# Patient Record
Sex: MALE | Race: WHITE | Employment: UNEMPLOYED | URBAN - METROPOLITAN AREA
[De-identification: names, ages, dates, MRNs, and addresses within clinical notes are randomized per-mention and may not be internally consistent; named-entity substitution may affect disease eponyms.]

---

## 2020-12-23 ENCOUNTER — HOSPITAL ENCOUNTER (OUTPATIENT)
Age: 55
Discharge: HOME | End: 2020-12-25

## 2020-12-23 ENCOUNTER — HOSPITAL ENCOUNTER (OUTPATIENT)
Dept: GENERAL RADIOLOGY | Age: 55
Discharge: HOME | End: 2020-12-25

## 2020-12-23 PROCEDURE — 73630 X-RAY EXAM OF FOOT: CPT

## 2021-04-16 ENCOUNTER — APPOINTMENT (OUTPATIENT)
Dept: GENERAL RADIOLOGY | Age: 56
End: 2021-04-16

## 2021-04-16 ENCOUNTER — HOSPITAL ENCOUNTER (EMERGENCY)
Age: 56
Discharge: HOME | End: 2021-04-16

## 2021-04-16 VITALS
OXYGEN SATURATION: 97 % | DIASTOLIC BLOOD PRESSURE: 86 MMHG | HEIGHT: 72 IN | RESPIRATION RATE: 14 BRPM | TEMPERATURE: 98 F | HEART RATE: 100 BPM | BODY MASS INDEX: 35.89 KG/M2 | WEIGHT: 265 LBS | SYSTOLIC BLOOD PRESSURE: 170 MMHG

## 2021-04-16 DIAGNOSIS — M54.50 ACUTE RIGHT-SIDED LOW BACK PAIN WITHOUT SCIATICA: ICD-10-CM

## 2021-04-16 DIAGNOSIS — S33.5XXA LUMBAR SPRAIN, INITIAL ENCOUNTER: Primary | ICD-10-CM

## 2021-04-16 PROCEDURE — 96372 THER/PROPH/DIAG INJ SC/IM: CPT

## 2021-04-16 PROCEDURE — 99283 EMERGENCY DEPT VISIT LOW MDM: CPT

## 2021-04-16 PROCEDURE — 6360000002 HC RX W HCPCS: Performed by: PHYSICIAN ASSISTANT

## 2021-04-16 PROCEDURE — 72100 X-RAY EXAM L-S SPINE 2/3 VWS: CPT

## 2021-04-16 RX ORDER — DEXAMETHASONE SODIUM PHOSPHATE 10 MG/ML
10 INJECTION, SOLUTION INTRAMUSCULAR; INTRAVENOUS ONCE
Status: COMPLETED | OUTPATIENT
Start: 2021-04-16 | End: 2021-04-16

## 2021-04-16 RX ORDER — LISINOPRIL 20 MG/1
20 TABLET ORAL DAILY
COMMUNITY
End: 2021-04-25

## 2021-04-16 RX ORDER — KETOROLAC TROMETHAMINE 30 MG/ML
30 INJECTION, SOLUTION INTRAMUSCULAR; INTRAVENOUS ONCE
Status: COMPLETED | OUTPATIENT
Start: 2021-04-16 | End: 2021-04-16

## 2021-04-16 RX ORDER — HYDROCODONE BITARTRATE AND ACETAMINOPHEN 5; 325 MG/1; MG/1
1 TABLET ORAL EVERY 6 HOURS PRN
Qty: 12 TABLET | Refills: 0 | Status: SHIPPED | OUTPATIENT
Start: 2021-04-16 | End: 2021-04-19

## 2021-04-16 RX ORDER — PIOGLITAZONEHYDROCHLORIDE 15 MG/1
15 TABLET ORAL DAILY
COMMUNITY

## 2021-04-16 RX ORDER — TRAMADOL HYDROCHLORIDE 50 MG/1
50 TABLET ORAL EVERY 6 HOURS PRN
COMMUNITY

## 2021-04-16 RX ADMIN — DEXAMETHASONE SODIUM PHOSPHATE 10 MG: 10 INJECTION, SOLUTION INTRAMUSCULAR; INTRAVENOUS at 15:02

## 2021-04-16 RX ADMIN — KETOROLAC TROMETHAMINE 30 MG: 30 INJECTION, SOLUTION INTRAMUSCULAR at 15:02

## 2021-04-16 ASSESSMENT — PAIN DESCRIPTION - ORIENTATION: ORIENTATION: LOWER

## 2021-04-16 ASSESSMENT — PAIN SCALES - GENERAL: PAINLEVEL_OUTOF10: 1

## 2021-04-16 ASSESSMENT — PAIN DESCRIPTION - DESCRIPTORS: DESCRIPTORS: ACHING

## 2021-04-16 NOTE — ED PROVIDER NOTES
[facility]                                                                                                                                      Department of Emergency Medicine   ED  Provider Note  Admit Date/RoomTime: 4/16/2021  2:42 PM  ED Room: 33/33        HPI:  4/16/21,   Time: 2:49 PM EDT         Shelly Rose is a 48 y.o. male presenting to the ED for right sided low back pain, beginning 1 month ago. The complaint has been persistent, moderate in severity, and worsened by standing. Patient states that about a month ago he was helping his stepson recover from hip surgery. He reports that he leaned over in the back of his jeep to pull part of the recliner out from inside the vehicle. He states that he felt fine at the time but when he woke the next day he really could not stand or walk. He has severe pain in the right lower lumbar region that is aggravated with standing. He states that when he sitting he does not feel much pain at all. He states that when he stands still it feels like someone squeezing and tightening his back. The pain does sometimes run into both anterior thighs. He denies numbness or tingling. There is no loss of bowel or bladder control. Denies fever or chills. The patient did see his PCP and states that they did some adjustment/manipulation in the office that helped for about 20 minutes. He states that there were no x-rays or meds given. The patient is a type II diabetic but reports that his sugars run around 1 20-1 30 and he keeps a close eye on them at home. No prior back MRI or surgery.           ROS:     Constitutional: Negative for fever and chills  HENT: Negative for ear pain, sore throat and sinus pressure  Eyes: Negative for pain, discharge and redness  Respiratory:  Negative for shortness of breath, cough and wheezing  Cardiovascular: Negative for CP, edema or palpitations  Gastrointestinal: Negative for nausea, vomiting, diarrhea and abdominal distention  Genitourinary: Negative for dysuria and frequency  Musculoskeletal:  See HPI  Skin: Negative for rash and wound  Neurological: Negative for weakness and headaches  Hematological: Negative for adenopathy    All other systems reviewed and are negative      -------------------------------- PAST HISTORY ----------------------------------  Past Medical History:  has a past medical history of Arthritis, Diabetes mellitus ([facility] [address] Difficult intubation, Hypertension, Infected tooth, Pain, and Preoperative clearance. Past Surgical History:  has a past surgical history that includes Neck surgery (9001,7905); Carpal tunnel release (Right, 2012); and Wrist arthroscopy (5/30/14). Social History:  reports that he has been smoking. He has a 5.50 pack-year smoking history. He has never used smokeless tobacco. He reports that he does not drink alcohol or use drugs. Family History: family history is not on file. The patients home medications have been reviewed. Allergies: Patient has no known allergies. --------------------------------- RESULTS ------------------------------------------  All laboratory and radiology results have been personally reviewed by myself   LABS:  No results found for this visit on 04/16/21. RADIOLOGY:  Interpreted by Radiologist.  XR LUMBAR SPINE (2-3 VIEWS)   Final Result   1. There are partially imaged erosive changes at the T11-T12 disc space with   mild height loss of the T12 vertebral body. These findings may be remote   posttraumatic and/or degenerative in etiology, however chronic/prior disc   osteomyelitis can also have a similar appearance. Recommend correlation with   patient's symptoms to exclude concern for active infection. 2. There are minimal osseous degenerative changes in the lumbar spine.             ----------------- NURSING NOTES AND VITALS REVIEWED ---------------   The nursing notes within the ED encounter and vital signs as below have been reviewed. referral to specialist for persistent pain. He is aware and agreeable to this plan       Counseling: The emergency provider has spoken with the patient and discussed todays results, in addition to providing specific details for the plan of care and counseling regarding the diagnosis and prognosis. Questions are answered at this time and they are agreeable with the plan.      ------------------------ IMPRESSION AND DISPOSITION -------------------------------    IMPRESSION  1. Lumbar sprain, initial encounter    2.  Acute right-sided low back pain without sciatica        DISPOSITION  Disposition: Discharge to home  Patient condition is stable                   Nohemi Cox PA-C  04/16/21 6241

## 2021-04-25 ENCOUNTER — HOSPITAL ENCOUNTER (INPATIENT)
Age: 56
LOS: 19 days | Discharge: ANOTHER HOSPITAL | DRG: 321 | End: 2021-05-14
Attending: EMERGENCY MEDICINE | Admitting: INTERNAL MEDICINE
Payer: COMMERCIAL

## 2021-04-25 DIAGNOSIS — M54.6 ACUTE THORACIC BACK PAIN, UNSPECIFIED BACK PAIN LATERALITY: Primary | ICD-10-CM

## 2021-04-25 LAB
ALBUMIN SERPL-MCNC: 4 G/DL (ref 3.5–5.2)
ALP BLD-CCNC: 98 U/L (ref 40–129)
ALT SERPL-CCNC: 13 U/L (ref 0–40)
ANION GAP SERPL CALCULATED.3IONS-SCNC: 11 MMOL/L (ref 7–16)
AST SERPL-CCNC: 11 U/L (ref 0–39)
BACTERIA: NORMAL /HPF
BASOPHILS ABSOLUTE: 0.08 E9/L (ref 0–0.2)
BASOPHILS RELATIVE PERCENT: 0.6 % (ref 0–2)
BILIRUB SERPL-MCNC: 0.3 MG/DL (ref 0–1.2)
BILIRUBIN URINE: NEGATIVE
BLOOD, URINE: NEGATIVE
BUN BLDV-MCNC: 16 MG/DL (ref 6–20)
CALCIUM SERPL-MCNC: 10 MG/DL (ref 8.6–10.2)
CHLORIDE BLD-SCNC: 94 MMOL/L (ref 98–107)
CLARITY: CLEAR
CO2: 23 MMOL/L (ref 22–29)
COLOR: YELLOW
CREAT SERPL-MCNC: 0.7 MG/DL (ref 0.7–1.2)
EOSINOPHILS ABSOLUTE: 0.41 E9/L (ref 0.05–0.5)
EOSINOPHILS RELATIVE PERCENT: 3.3 % (ref 0–6)
GFR AFRICAN AMERICAN: >60
GFR NON-AFRICAN AMERICAN: >60 ML/MIN/1.73
GLUCOSE BLD-MCNC: 196 MG/DL (ref 74–99)
GLUCOSE URINE: NEGATIVE MG/DL
HCT VFR BLD CALC: 50.5 % (ref 37–54)
HEMOGLOBIN: 15.7 G/DL (ref 12.5–16.5)
HYALINE CASTS: NORMAL /LPF (ref 0–2)
IMMATURE GRANULOCYTES #: 0.03 E9/L
IMMATURE GRANULOCYTES %: 0.2 % (ref 0–5)
KETONES, URINE: NEGATIVE MG/DL
LACTIC ACID, SEPSIS: 1.6 MMOL/L (ref 0.5–1.9)
LEUKOCYTE ESTERASE, URINE: NEGATIVE
LYMPHOCYTES ABSOLUTE: 3.24 E9/L (ref 1.5–4)
LYMPHOCYTES RELATIVE PERCENT: 26 % (ref 20–42)
MCH RBC QN AUTO: 26.4 PG (ref 26–35)
MCHC RBC AUTO-ENTMCNC: 31.1 % (ref 32–34.5)
MCV RBC AUTO: 85 FL (ref 80–99.9)
METER GLUCOSE: 168 MG/DL (ref 74–99)
MONOCYTES ABSOLUTE: 0.99 E9/L (ref 0.1–0.95)
MONOCYTES RELATIVE PERCENT: 7.9 % (ref 2–12)
NEUTROPHILS ABSOLUTE: 7.72 E9/L (ref 1.8–7.3)
NEUTROPHILS RELATIVE PERCENT: 62 % (ref 43–80)
NITRITE, URINE: NEGATIVE
PDW BLD-RTO: 13.2 FL (ref 11.5–15)
PH UA: 6 (ref 5–9)
PLATELET # BLD: 395 E9/L (ref 130–450)
PMV BLD AUTO: 9.8 FL (ref 7–12)
POTASSIUM REFLEX MAGNESIUM: 4.9 MMOL/L (ref 3.5–5)
PROTEIN UA: NORMAL MG/DL
RBC # BLD: 5.94 E12/L (ref 3.8–5.8)
RBC UA: NORMAL /HPF (ref 0–2)
SODIUM BLD-SCNC: 128 MMOL/L (ref 132–146)
SPECIFIC GRAVITY UA: >=1.03 (ref 1–1.03)
TOTAL PROTEIN: 8 G/DL (ref 6.4–8.3)
UROBILINOGEN, URINE: 0.2 E.U./DL
WBC # BLD: 12.5 E9/L (ref 4.5–11.5)
WBC UA: NORMAL /HPF (ref 0–5)

## 2021-04-25 PROCEDURE — 81001 URINALYSIS AUTO W/SCOPE: CPT

## 2021-04-25 PROCEDURE — 99284 EMERGENCY DEPT VISIT MOD MDM: CPT

## 2021-04-25 PROCEDURE — 2580000003 HC RX 258: Performed by: NURSE PRACTITIONER

## 2021-04-25 PROCEDURE — 6360000002 HC RX W HCPCS: Performed by: NURSE PRACTITIONER

## 2021-04-25 PROCEDURE — 87040 BLOOD CULTURE FOR BACTERIA: CPT

## 2021-04-25 PROCEDURE — 6370000000 HC RX 637 (ALT 250 FOR IP): Performed by: PHYSICIAN ASSISTANT

## 2021-04-25 PROCEDURE — 36415 COLL VENOUS BLD VENIPUNCTURE: CPT

## 2021-04-25 PROCEDURE — 2580000003 HC RX 258: Performed by: PHYSICIAN ASSISTANT

## 2021-04-25 PROCEDURE — 80053 COMPREHEN METABOLIC PANEL: CPT

## 2021-04-25 PROCEDURE — 82962 GLUCOSE BLOOD TEST: CPT

## 2021-04-25 PROCEDURE — 87088 URINE BACTERIA CULTURE: CPT

## 2021-04-25 PROCEDURE — 83605 ASSAY OF LACTIC ACID: CPT

## 2021-04-25 PROCEDURE — 1200000000 HC SEMI PRIVATE

## 2021-04-25 PROCEDURE — 96375 TX/PRO/DX INJ NEW DRUG ADDON: CPT

## 2021-04-25 PROCEDURE — 96374 THER/PROPH/DIAG INJ IV PUSH: CPT

## 2021-04-25 PROCEDURE — 6370000000 HC RX 637 (ALT 250 FOR IP): Performed by: NURSE PRACTITIONER

## 2021-04-25 PROCEDURE — 85025 COMPLETE CBC W/AUTO DIFF WBC: CPT

## 2021-04-25 PROCEDURE — 6360000002 HC RX W HCPCS: Performed by: PHYSICIAN ASSISTANT

## 2021-04-25 PROCEDURE — 96361 HYDRATE IV INFUSION ADD-ON: CPT

## 2021-04-25 RX ORDER — LISINOPRIL AND HYDROCHLOROTHIAZIDE 25; 20 MG/1; MG/1
1 TABLET ORAL DAILY
COMMUNITY

## 2021-04-25 RX ORDER — DEXTROSE MONOHYDRATE 50 MG/ML
100 INJECTION, SOLUTION INTRAVENOUS PRN
Status: DISCONTINUED | OUTPATIENT
Start: 2021-04-25 | End: 2021-05-14 | Stop reason: HOSPADM

## 2021-04-25 RX ORDER — KETOROLAC TROMETHAMINE 30 MG/ML
15 INJECTION, SOLUTION INTRAMUSCULAR; INTRAVENOUS EVERY 6 HOURS PRN
Status: DISCONTINUED | OUTPATIENT
Start: 2021-04-25 | End: 2021-04-26

## 2021-04-25 RX ORDER — SODIUM CHLORIDE 0.9 % (FLUSH) 0.9 %
10 SYRINGE (ML) INJECTION PRN
Status: DISCONTINUED | OUTPATIENT
Start: 2021-04-25 | End: 2021-05-14 | Stop reason: HOSPADM

## 2021-04-25 RX ORDER — NICOTINE 21 MG/24HR
1 PATCH, TRANSDERMAL 24 HOURS TRANSDERMAL ONCE
Status: DISCONTINUED | OUTPATIENT
Start: 2021-04-25 | End: 2021-04-26

## 2021-04-25 RX ORDER — HYDROCHLOROTHIAZIDE 25 MG/1
25 TABLET ORAL DAILY
Status: DISCONTINUED | OUTPATIENT
Start: 2021-04-26 | End: 2021-04-29

## 2021-04-25 RX ORDER — DEXTROSE MONOHYDRATE 25 G/50ML
12.5 INJECTION, SOLUTION INTRAVENOUS PRN
Status: DISCONTINUED | OUTPATIENT
Start: 2021-04-25 | End: 2021-05-14 | Stop reason: HOSPADM

## 2021-04-25 RX ORDER — SODIUM CHLORIDE 9 MG/ML
25 INJECTION, SOLUTION INTRAVENOUS PRN
Status: DISCONTINUED | OUTPATIENT
Start: 2021-04-25 | End: 2021-05-14 | Stop reason: HOSPADM

## 2021-04-25 RX ORDER — LISINOPRIL 20 MG/1
20 TABLET ORAL DAILY
Status: DISCONTINUED | OUTPATIENT
Start: 2021-04-26 | End: 2021-05-14 | Stop reason: HOSPADM

## 2021-04-25 RX ORDER — ACETAMINOPHEN 650 MG/1
650 SUPPOSITORY RECTAL EVERY 6 HOURS PRN
Status: DISCONTINUED | OUTPATIENT
Start: 2021-04-25 | End: 2021-04-26

## 2021-04-25 RX ORDER — TRAMADOL HYDROCHLORIDE 50 MG/1
50 TABLET ORAL EVERY 8 HOURS PRN
Status: DISCONTINUED | OUTPATIENT
Start: 2021-04-25 | End: 2021-04-26

## 2021-04-25 RX ORDER — MORPHINE SULFATE 4 MG/ML
8 INJECTION, SOLUTION INTRAMUSCULAR; INTRAVENOUS ONCE
Status: COMPLETED | OUTPATIENT
Start: 2021-04-25 | End: 2021-04-25

## 2021-04-25 RX ORDER — POTASSIUM CHLORIDE 20 MEQ/1
40 TABLET, EXTENDED RELEASE ORAL PRN
Status: DISCONTINUED | OUTPATIENT
Start: 2021-04-25 | End: 2021-05-14 | Stop reason: HOSPADM

## 2021-04-25 RX ORDER — ONDANSETRON 4 MG/1
4 TABLET, ORALLY DISINTEGRATING ORAL ONCE
Status: COMPLETED | OUTPATIENT
Start: 2021-04-25 | End: 2021-04-25

## 2021-04-25 RX ORDER — HYDRALAZINE HYDROCHLORIDE 20 MG/ML
10 INJECTION INTRAMUSCULAR; INTRAVENOUS EVERY 6 HOURS PRN
Status: DISCONTINUED | OUTPATIENT
Start: 2021-04-25 | End: 2021-04-28

## 2021-04-25 RX ORDER — LAMOTRIGINE 100 MG/1
100 TABLET ORAL 2 TIMES DAILY
COMMUNITY

## 2021-04-25 RX ORDER — LISINOPRIL AND HYDROCHLOROTHIAZIDE 25; 20 MG/1; MG/1
1 TABLET ORAL DAILY
Status: DISCONTINUED | OUTPATIENT
Start: 2021-04-26 | End: 2021-04-25 | Stop reason: CLARIF

## 2021-04-25 RX ORDER — POTASSIUM CHLORIDE 7.45 MG/ML
10 INJECTION INTRAVENOUS PRN
Status: DISCONTINUED | OUTPATIENT
Start: 2021-04-25 | End: 2021-05-14 | Stop reason: HOSPADM

## 2021-04-25 RX ORDER — OXYCODONE HYDROCHLORIDE AND ACETAMINOPHEN 5; 325 MG/1; MG/1
1 TABLET ORAL ONCE
Status: COMPLETED | OUTPATIENT
Start: 2021-04-25 | End: 2021-04-25

## 2021-04-25 RX ORDER — ACETAMINOPHEN 325 MG/1
650 TABLET ORAL EVERY 6 HOURS PRN
Status: DISCONTINUED | OUTPATIENT
Start: 2021-04-25 | End: 2021-04-26

## 2021-04-25 RX ORDER — SULFAMETHOXAZOLE AND TRIMETHOPRIM 800; 160 MG/1; MG/1
1 TABLET ORAL 2 TIMES DAILY
COMMUNITY

## 2021-04-25 RX ORDER — NICOTINE POLACRILEX 4 MG
15 LOZENGE BUCCAL PRN
Status: DISCONTINUED | OUTPATIENT
Start: 2021-04-25 | End: 2021-05-14 | Stop reason: HOSPADM

## 2021-04-25 RX ORDER — 0.9 % SODIUM CHLORIDE 0.9 %
1000 INTRAVENOUS SOLUTION INTRAVENOUS ONCE
Status: COMPLETED | OUTPATIENT
Start: 2021-04-25 | End: 2021-04-25

## 2021-04-25 RX ORDER — SODIUM CHLORIDE 0.9 % (FLUSH) 0.9 %
10 SYRINGE (ML) INJECTION EVERY 12 HOURS SCHEDULED
Status: DISCONTINUED | OUTPATIENT
Start: 2021-04-25 | End: 2021-05-14 | Stop reason: HOSPADM

## 2021-04-25 RX ORDER — NICOTINE 21 MG/24HR
1 PATCH, TRANSDERMAL 24 HOURS TRANSDERMAL DAILY
Status: DISCONTINUED | OUTPATIENT
Start: 2021-04-26 | End: 2021-04-26

## 2021-04-25 RX ORDER — LAMOTRIGINE 100 MG/1
100 TABLET ORAL 2 TIMES DAILY
Status: DISCONTINUED | OUTPATIENT
Start: 2021-04-25 | End: 2021-05-14 | Stop reason: HOSPADM

## 2021-04-25 RX ADMIN — HYDROMORPHONE HYDROCHLORIDE 0.5 MG: 1 INJECTION, SOLUTION INTRAMUSCULAR; INTRAVENOUS; SUBCUTANEOUS at 23:23

## 2021-04-25 RX ADMIN — Medication 10 ML: at 20:24

## 2021-04-25 RX ADMIN — ONDANSETRON 4 MG: 4 TABLET, ORALLY DISINTEGRATING ORAL at 15:15

## 2021-04-25 RX ADMIN — HYDROMORPHONE HYDROCHLORIDE 0.5 MG: 1 INJECTION, SOLUTION INTRAMUSCULAR; INTRAVENOUS; SUBCUTANEOUS at 20:24

## 2021-04-25 RX ADMIN — TRAMADOL HYDROCHLORIDE 50 MG: 50 TABLET, COATED ORAL at 21:53

## 2021-04-25 RX ADMIN — OXYCODONE AND ACETAMINOPHEN 1 TABLET: 5; 325 TABLET ORAL at 15:15

## 2021-04-25 RX ADMIN — VANCOMYCIN HYDROCHLORIDE 2500 MG: 10 INJECTION, POWDER, LYOPHILIZED, FOR SOLUTION INTRAVENOUS at 23:16

## 2021-04-25 RX ADMIN — MORPHINE SULFATE 8 MG: 4 INJECTION, SOLUTION INTRAMUSCULAR; INTRAVENOUS at 17:34

## 2021-04-25 RX ADMIN — CEFTRIAXONE SODIUM 2000 MG: 2 INJECTION, POWDER, FOR SOLUTION INTRAMUSCULAR; INTRAVENOUS at 17:34

## 2021-04-25 RX ADMIN — ACETAMINOPHEN 650 MG: 325 TABLET ORAL at 20:23

## 2021-04-25 RX ADMIN — LAMOTRIGINE 100 MG: 100 TABLET ORAL at 23:15

## 2021-04-25 RX ADMIN — SODIUM CHLORIDE 1000 ML: 9 INJECTION, SOLUTION INTRAVENOUS at 17:34

## 2021-04-25 ASSESSMENT — PAIN DESCRIPTION - ORIENTATION
ORIENTATION: MID
ORIENTATION: MID;LOWER

## 2021-04-25 ASSESSMENT — PAIN SCALES - GENERAL
PAINLEVEL_OUTOF10: 8
PAINLEVEL_OUTOF10: 9
PAINLEVEL_OUTOF10: 8

## 2021-04-25 ASSESSMENT — PAIN DESCRIPTION - FREQUENCY: FREQUENCY: CONTINUOUS

## 2021-04-25 ASSESSMENT — PAIN DESCRIPTION - LOCATION
LOCATION: BACK
LOCATION: BACK

## 2021-04-25 ASSESSMENT — PAIN - FUNCTIONAL ASSESSMENT: PAIN_FUNCTIONAL_ASSESSMENT: PREVENTS OR INTERFERES SOME ACTIVE ACTIVITIES AND ADLS

## 2021-04-25 ASSESSMENT — PAIN DESCRIPTION - ONSET: ONSET: ON-GOING

## 2021-04-25 ASSESSMENT — PAIN DESCRIPTION - PROGRESSION: CLINICAL_PROGRESSION: NOT CHANGED

## 2021-04-25 ASSESSMENT — PAIN DESCRIPTION - PAIN TYPE: TYPE: ACUTE PAIN

## 2021-04-25 NOTE — LETTER
[place] Department Medicaid  CERTIFICATION OF NECESSITY  FOR NON-EMERGENCY TRANSPORTATION   BY GROUND AMBULANCE      Individual Information   1. Name: Reyes Anand 2. [place] Medicaid Billing Number:    3. Address: [place] [address]      Transportation Provider Information   4. Provider Name:    5. [place] Medicaid Provider Number:  National Provider Identifier (NPI):      Certification  7. Criteria:  During transport, this individual requires:  [x] Medical treatment or continuous     supervision by an EMT. [] The administration or regulation of oxygen by another person. [] Supervised protective restraint. 8. Period Beginning Date: 2021   9. Length  [x] Not more than 7 day(s)  [] One Year     Additional Information Relevant to Certification   10. Comments or Explanations, If Necessary or Appropriate   Thoracic back pain,spinal neutrality, T12 compression fx      Certifying Practitioner Information   11. Name of Practitioner: Dr. Jen Orellana MD   12. [place] Medicaid Provider Number, If Applicable:  [address] Provider Identifier (NPI):      Signature Information   14. Date of Signature: 2021 15. Name of Person Signing: Tasia Goldberg   12. Signature and Professional Designation: Electronically signed by AIYANA Cunningham on 2021 at 2:19 PM       [facility] [id number]  Rev. 2015       [address] [place] [address] Encounter Date/Time: 2021 32-36 [facility] Account: [de-identified]    MRN: [id number]    Patient: Brooklyn Lombardo Serial #: [id number]      ENCOUNTER          Patient Class: I Private Enc?   No Unit RM BD: [id number] [id number]-[id number]   Hospital Service: Med/Surg   Encounter DX: Thoracic back pain, unsp*   ADM Provider: Herb Mantilla MD   Procedure:     ATT Provider: Herb Mantilla MD   REF Provider:        Admission DX: Thoracic back pain, unspecified back pain laterality, unspecified chronicity and codes:       PATIENT                 Name: Reyes Anand : 1965

## 2021-04-25 NOTE — ED PROVIDER NOTES
ED Attending  CC: Shavon         [facility]  Department of Emergency Medicine   ED  Encounter Note  Admit Date/RoomTime: 2021  4:38 PM  ED Room:   NAME: Jhony Barreto  : 1965  MRN: [id number]     Chief Complaint:  Back Pain (middle back; \"feels like a knot\") and Leg Pain (both thighs)    HISTORY OF PRESENT ILLNESS        Jhony Barreto is a 48 y.o. male who presents to the ED by private vehicle for persistent back pain since being evaluated at [facility] on 21. He states there was an abnormality on his x-ray and he has had persistent mid back pain that radiates to the right mid back and to the bilateral anterior thighs that \"feels like a knot. \"  The original injury was from over one month ago when he was carrying the back of a recliner. The next day he woke up with back pain and it has been ongoing ever since. It has not been associated with any fever, chills, chest pain, shortness of breath, abdominal pain, dysuria, hematuria, loss of bowel or bladder control, urinary frequency, urinary retention, black or bloody stools, saddle paresthesia, numbness or tingling. He denies any new traumatic incident and no IVDU. Last Monday he contacted his primary care provider stating that he has a history of kidney infections and kidney stones. With his worsening of back pain, he was treated empirically with Bactrim. He has been taking it twice daily and is on his sixth day with no improvement in his back pain. He denies ever having any urinary symptoms but states he just wanted to \"get ahead of it in case it was a kidney infection. \"  He reports his pain to be aggravated by any movement especially getting up and walking causes the shooting pain into his bilateral anterior thighs. It is unalleviated with ibuprofen, rest and heat. He has a history of cervical fusion at [facility] in [address]  The complaint has been persistent and rapidly worsening and are moderate in severity. ROS   Pertinent positives and negatives are stated within HPI, all other systems reviewed and are negative. Past Medical History:  has a past medical history of Arthritis, Diabetes mellitus ([address] Difficult intubation, Hypertension, Infected tooth, Pain, and Preoperative clearance. Surgical History:  has a past surgical history that includes Neck surgery (8245,4322); Carpal tunnel release (Right, 2012); and Wrist arthroscopy (5/30/14). Social History:  reports that he has been smoking. He has a 5.50 pack-year smoking history. He has never used smokeless tobacco. He reports that he does not drink alcohol or use drugs. Family History: family history is not on file. Allergies: Patient has no known allergies. PHYSICAL EXAM   Oxygen Saturation Interpretation: Normal.        ED Triage Vitals   BP Temp Temp Source Pulse Resp SpO2 Height Weight   04/25/21 1506 04/25/21 1510 04/25/21 1510 04/25/21 1429 04/25/21 1506 04/25/21 1429 04/25/21 1506 04/25/21 1506   (!) 167/99 98.4 °F (36.9 °C) Oral 115 20 95 % 6' (1.829 m) 266 lb (120.7 kg)         Physical Exam  Constitutional/General: Alert and oriented x3, well appearing, non toxic  HEENT:  NC/NT. PERRLA,  Airway patent. Neck: Supple, full ROM, non tender to palpation in the midline, no stridor, no crepitus, no meningeal signs  Respiratory: Lungs clear to auscultation bilaterally, no wheezes, rales, or rhonchi. Not in respiratory distress  CV:  Tachycardic rate. Regular rhythm. No murmurs, gallops, or rubs. 2+ distal pulses  GI:  Abdomen Soft, Non tender, Non distended. +BS. No rebound, guarding, or rigidity. No pulsatile masses. Back: Skin tags over the midline lower thoracic spine. There is no appreciated crepitus or deformity. Just off the midline to the right is pain to palpation which reproduces his back pain. There is no other midline vertebral tenderness. There is also right CVA tenderness to palpation.   There is no outward sign of trauma, vesicular rash or ecchymosis. Musculoskeletal: Moves all extremities x 4. Warm and well perfused, no clubbing, cyanosis, or edema. No calf tenderness bilaterally or localized foot/ankle edema. Capillary refill <3 seconds  Integument: skin warm and dry. No rashes. Lymphatic: no lymphadenopathy noted  Neurologic: GCS 15, no focal deficits, symmetric strength 5/5 in the upper and lower extremities bilaterally. Strong dorsiflexion and plantar flexion bilaterally. Negative straight leg raise bilaterally.   Psychiatric: Normal Affect    Lab / Imaging Results   (All laboratory and radiology results have been personally reviewed by myself)  Labs:  Results for orders placed or performed during the hospital encounter of 04/25/21   Comprehensive Metabolic Panel w/ Reflex to MG   Result Value Ref Range    Sodium 128 (L) 132 - 146 mmol/L    Potassium reflex Magnesium 4.9 3.5 - 5.0 mmol/L    Chloride 94 (L) 98 - 107 mmol/L    CO2 23 22 - 29 mmol/L    Anion Gap 11 7 - 16 mmol/L    Glucose 196 (H) 74 - 99 mg/dL    BUN 16 6 - 20 mg/dL    CREATININE 0.7 0.7 - 1.2 mg/dL    GFR Non-African American >60 >=60 mL/min/1.73    GFR African American >60     Calcium 10.0 8.6 - 10.2 mg/dL    Total Protein 8.0 6.4 - 8.3 g/dL    Albumin 4.0 3.5 - 5.2 g/dL    Total Bilirubin 0.3 0.0 - 1.2 mg/dL    Alkaline Phosphatase 98 40 - 129 U/L    ALT 13 0 - 40 U/L    AST 11 0 - 39 U/L   CBC Auto Differential   Result Value Ref Range    WBC 12.5 (H) 4.5 - 11.5 E9/L    RBC 5.94 (H) 3.80 - 5.80 E12/L    Hemoglobin 15.7 12.5 - 16.5 g/dL    Hematocrit 50.5 37.0 - 54.0 %    MCV 85.0 80.0 - 99.9 fL    MCH 26.4 26.0 - 35.0 pg    MCHC 31.1 (L) 32.0 - 34.5 %    RDW 13.2 11.5 - 15.0 fL    Platelets 128 609 - 962 E9/L    MPV 9.8 7.0 - 12.0 fL    Neutrophils % 62.0 43.0 - 80.0 %    Immature Granulocytes % 0.2 0.0 - 5.0 %    Lymphocytes % 26.0 20.0 - 42.0 %    Monocytes % 7.9 2.0 - 12.0 %    Eosinophils % 3.3 0.0 - 6.0 %    Basophils % 0.6 0.0 - 2.0 % Neutrophils Absolute 7.72 (H) 1.80 - 7.30 E9/L    Immature Granulocytes # 0.03 E9/L    Lymphocytes Absolute 3.24 1.50 - 4.00 E9/L    Monocytes Absolute 0.99 (H) 0.10 - 0.95 E9/L    Eosinophils Absolute 0.41 0.05 - 0.50 E9/L    Basophils Absolute 0.08 0.00 - 0.20 E9/L   Urinalysis   Result Value Ref Range    Color, UA Yellow Straw/Yellow    Clarity, UA Clear Clear    Glucose, Ur Negative Negative mg/dL    Bilirubin Urine Negative Negative    Ketones, Urine Negative Negative mg/dL    Specific Gravity, UA >=1.030 1.005 - 1.030    Blood, Urine Negative Negative    pH, UA 6.0 5.0 - 9.0    Protein, UA TRACE Negative mg/dL    Urobilinogen, Urine 0.2 <2.0 E.U./dL    Nitrite, Urine Negative Negative    Leukocyte Esterase, Urine Negative Negative   Lactate, Sepsis   Result Value Ref Range    Lactic Acid, Sepsis 1.6 0.5 - 1.9 mmol/L   Microscopic Urinalysis   Result Value Ref Range    Hyaline Casts, UA 0-2 0 - 2 /LPF    WBC, UA 0-1 0 - 5 /HPF    RBC, UA NONE 0 - 2 /HPF    Bacteria, UA NONE SEEN None Seen /HPF     Imaging: All Radiology results interpreted by Radiologist unless otherwise noted. MRI THORACIC SPINE W CONTRAST    (Results Pending)   MRI LUMBAR SPINE W CONTRAST    (Results Pending)       ED Course / Medical Decision Making     Medications   vancomycin (VANCOCIN) 2,500 mg in dextrose 5 % 500 mL IVPB (has no administration in time range)   0.9 % sodium chloride bolus (1,000 mLs Intravenous New Bag 4/25/21 1734)   oxyCODONE-acetaminophen (PERCOCET) 5-325 MG per tablet 1 tablet (1 tablet Oral Given 4/25/21 1515)   ondansetron (ZOFRAN-ODT) disintegrating tablet 4 mg (4 mg Oral Given 4/25/21 1515)   cefTRIAXone (ROCEPHIN) 2,000 mg in sterile water 20 mL IV syringe (2,000 mg Intravenous Given 4/25/21 1734)   morphine (PF) injection 8 mg (8 mg Intravenous Given 4/25/21 1734)        Re-examination:  4/25/21       Time: 1700  Patients condition remains unchanged.  Patient updated on plan of care including admission and pending MRI. Consult(s):   IP CONSULT TO HOSPITALIST    Time: 18 Spoke with Marialuisa who accepts the patient for admission. Procedure(s):   none    MDM:   Patient evaluated by Dr. Ozzie Campbell as well. Review of his lumbar x-ray from April 16 reveals erosive changes at T11-T12 disc space with mild height loss of the superior T12 vertebral body. With his persistent pain, radiculopathy and leukocytosis, concern is for discitis therefore plan is for empiric coverage with Rocephin and vancomycin as per the suggestion of the clinical pharmacist [facility] and admission to the hospital for MRI. Patient is well-appearing, afebrile and without focal deficit therefore MRI was not called out for stat imaging. Other labs and diagnostics as resulted above. Discussed his history of kidney stones and given his clean UA as above, patient's low suspicion for kidney stone and lack of renal colic a CT of the abdomen and pelvis was not completed. Discitis was covered with Rocephin which would cover a pyelonephritis incidentally. Admission discussed with [facility] consultants and patient admitted. Plan of Care/Counseling:  Myself and Emergency Attending Physician reviewed today's visit with the patient in addition to providing specific details for the plan of care and counseling regarding the diagnosis and prognosis. Questions are answered at this time and are agreeable with the plan. ASSESSMENT     1. Acute thoracic back pain, unspecified back pain laterality      PLAN   Admission to General Medical Floor. Patient condition is stable    New Medications     New Prescriptions    No medications on file     Electronically signed by KARLA Hicks CNP   DD: 4/25/21  **This report was transcribed using voice recognition software. Every effort was made to ensure accuracy; however, inadvertent computerized transcription errors may be present.   END OF ED PROVIDER NOTE       KARLA Hicks CNP  04/25/21 1278

## 2021-04-25 NOTE — LETTER
[place] Department Medicaid  CERTIFICATION OF NECESSITY  FOR NON-EMERGENCY TRANSPORTATION   BY GROUND AMBULANCE      Individual Information   1. Name: Isaías Yanez 2. [place] Medicaid Billing Number:    3. Address: [place] [address]      Transportation Provider Information   4. Provider Name:    5. [place] Medicaid Provider Number:  National Provider Identifier (NPI):      Certification  7. Criteria:  During transport, this individual requires:  [x] Medical treatment or continuous     supervision by an EMT. [] The administration or regulation of oxygen by another person. [] Supervised protective restraint. 8. Period Beginning Date: 05/07/2021   9. Length  [x] Not more than 7 day(s)  [] One Year     Additional Information Relevant to Certification   10. Comments or Explanations, If Necessary or Appropriate   T 12 COMPRESSION FX WITH EPIDURAL MASS  NEW DX HGH GRADE SARCOMATIOD RENAL CELL CARCINOMA,SPINAL PRECAUTIONS, MAX ASST [address] Practitioner Information   11. Name of Practitioner: Teresa Grewal MD   12. [place] Medicaid Provider Number, If Applicable:  [address] Provider Identifier (NPI):      Signature Information   14. Date of Signature: 05/7/21 15. Name of Person Signing: Joselito Smith   16. Signature and Professional Designation: Electronically signed by Erick Crigler, LSW on 5/7/2021 at 2:32 PM       [facility] [address]  Rev. 7/2015       [address] Encounter Date/Time: 4/25/2021 [address] [place] [address] Account: [de-identified]    MRN: [id number]    Patient: Stas Tay Serial #: [id number]      ENCOUNTER          Patient Class: I Private Enc?   No Unit  BD: [address] [facility] Service: Med/Surg   Encounter DX: Thoracic back pain, unsp*   ADM Provider: Lottie Brian MD   Procedure:     ATT Provider: Lottie Brian MD   REF Provider:        Admission DX: Thoracic back pain, unspecified back pain laterality, unspecified

## 2021-04-25 NOTE — ED NOTES
Bed: 07  Expected date:   Expected time:   Means of arrival:   Comments:  Triage      Chema Zendejas RN  04/25/21 4897

## 2021-04-26 ENCOUNTER — APPOINTMENT (OUTPATIENT)
Dept: MRI IMAGING | Age: 56
DRG: 321 | End: 2021-04-26
Payer: COMMERCIAL

## 2021-04-26 ENCOUNTER — APPOINTMENT (OUTPATIENT)
Dept: CT IMAGING | Age: 56
DRG: 321 | End: 2021-04-26
Payer: COMMERCIAL

## 2021-04-26 PROBLEM — E87.1 HYPONATREMIA: Status: ACTIVE | Noted: 2021-04-26

## 2021-04-26 PROBLEM — Z72.0 TOBACCO ABUSE: Status: ACTIVE | Noted: 2021-04-26

## 2021-04-26 LAB
ANION GAP SERPL CALCULATED.3IONS-SCNC: 10 MMOL/L (ref 7–16)
BASOPHILS ABSOLUTE: 0.09 E9/L (ref 0–0.2)
BASOPHILS RELATIVE PERCENT: 0.9 % (ref 0–2)
BUN BLDV-MCNC: 14 MG/DL (ref 6–20)
C-REACTIVE PROTEIN: 3 MG/DL (ref 0–0.4)
CALCIUM SERPL-MCNC: 9.4 MG/DL (ref 8.6–10.2)
CHLORIDE BLD-SCNC: 97 MMOL/L (ref 98–107)
CO2: 26 MMOL/L (ref 22–29)
CREAT SERPL-MCNC: 0.8 MG/DL (ref 0.7–1.2)
EOSINOPHILS ABSOLUTE: 0.41 E9/L (ref 0.05–0.5)
EOSINOPHILS RELATIVE PERCENT: 4.1 % (ref 0–6)
GFR AFRICAN AMERICAN: >60
GFR NON-AFRICAN AMERICAN: >60 ML/MIN/1.73
GLUCOSE BLD-MCNC: 149 MG/DL (ref 74–99)
HBA1C MFR BLD: 8.7 % (ref 4–5.6)
HCT VFR BLD CALC: 45.4 % (ref 37–54)
HEMOGLOBIN: 14.3 G/DL (ref 12.5–16.5)
IMMATURE GRANULOCYTES #: 0.03 E9/L
IMMATURE GRANULOCYTES %: 0.3 % (ref 0–5)
LYMPHOCYTES ABSOLUTE: 3.45 E9/L (ref 1.5–4)
LYMPHOCYTES RELATIVE PERCENT: 34.4 % (ref 20–42)
MCH RBC QN AUTO: 27.4 PG (ref 26–35)
MCHC RBC AUTO-ENTMCNC: 31.5 % (ref 32–34.5)
MCV RBC AUTO: 87 FL (ref 80–99.9)
METER GLUCOSE: 174 MG/DL (ref 74–99)
METER GLUCOSE: 186 MG/DL (ref 74–99)
MONOCYTES ABSOLUTE: 0.92 E9/L (ref 0.1–0.95)
MONOCYTES RELATIVE PERCENT: 9.2 % (ref 2–12)
NEUTROPHILS ABSOLUTE: 5.12 E9/L (ref 1.8–7.3)
NEUTROPHILS RELATIVE PERCENT: 51.1 % (ref 43–80)
PDW BLD-RTO: 13.4 FL (ref 11.5–15)
PLATELET # BLD: 319 E9/L (ref 130–450)
PMV BLD AUTO: 9.8 FL (ref 7–12)
POTASSIUM REFLEX MAGNESIUM: 4.7 MMOL/L (ref 3.5–5)
RBC # BLD: 5.22 E12/L (ref 3.8–5.8)
SEDIMENTATION RATE, ERYTHROCYTE: 68 MM/HR (ref 0–15)
SODIUM BLD-SCNC: 133 MMOL/L (ref 132–146)
WBC # BLD: 10 E9/L (ref 4.5–11.5)

## 2021-04-26 PROCEDURE — 6360000002 HC RX W HCPCS: Performed by: INTERNAL MEDICINE

## 2021-04-26 PROCEDURE — 1200000000 HC SEMI PRIVATE

## 2021-04-26 PROCEDURE — 86140 C-REACTIVE PROTEIN: CPT

## 2021-04-26 PROCEDURE — 80048 BASIC METABOLIC PNL TOTAL CA: CPT

## 2021-04-26 PROCEDURE — 82962 GLUCOSE BLOOD TEST: CPT

## 2021-04-26 PROCEDURE — 2580000003 HC RX 258: Performed by: INTERNAL MEDICINE

## 2021-04-26 PROCEDURE — 85651 RBC SED RATE NONAUTOMATED: CPT

## 2021-04-26 PROCEDURE — 6360000002 HC RX W HCPCS: Performed by: PHYSICIAN ASSISTANT

## 2021-04-26 PROCEDURE — 83036 HEMOGLOBIN GLYCOSYLATED A1C: CPT

## 2021-04-26 PROCEDURE — 6370000000 HC RX 637 (ALT 250 FOR IP): Performed by: INTERNAL MEDICINE

## 2021-04-26 PROCEDURE — 6360000002 HC RX W HCPCS

## 2021-04-26 PROCEDURE — 6370000000 HC RX 637 (ALT 250 FOR IP): Performed by: PHYSICIAN ASSISTANT

## 2021-04-26 PROCEDURE — 85025 COMPLETE CBC W/AUTO DIFF WBC: CPT

## 2021-04-26 PROCEDURE — 2580000003 HC RX 258: Performed by: PHYSICIAN ASSISTANT

## 2021-04-26 PROCEDURE — 72129 CT CHEST SPINE W/DYE: CPT

## 2021-04-26 PROCEDURE — 36415 COLL VENOUS BLD VENIPUNCTURE: CPT

## 2021-04-26 PROCEDURE — 6360000004 HC RX CONTRAST MEDICATION: Performed by: RADIOLOGY

## 2021-04-26 RX ORDER — OXYCODONE HYDROCHLORIDE 5 MG/1
5 TABLET ORAL EVERY 4 HOURS PRN
Status: DISCONTINUED | OUTPATIENT
Start: 2021-04-26 | End: 2021-04-29

## 2021-04-26 RX ORDER — SODIUM CHLORIDE 9 MG/ML
INJECTION, SOLUTION INTRAVENOUS CONTINUOUS
Status: DISCONTINUED | OUTPATIENT
Start: 2021-04-26 | End: 2021-04-26

## 2021-04-26 RX ORDER — ACETAMINOPHEN 500 MG
1000 TABLET ORAL 3 TIMES DAILY
Status: DISCONTINUED | OUTPATIENT
Start: 2021-04-26 | End: 2021-05-14 | Stop reason: HOSPADM

## 2021-04-26 RX ORDER — KETOROLAC TROMETHAMINE 30 MG/ML
30 INJECTION, SOLUTION INTRAMUSCULAR; INTRAVENOUS EVERY 6 HOURS
Status: DISCONTINUED | OUTPATIENT
Start: 2021-04-26 | End: 2021-04-28

## 2021-04-26 RX ORDER — NICOTINE 21 MG/24HR
1 PATCH, TRANSDERMAL 24 HOURS TRANSDERMAL DAILY
Status: DISCONTINUED | OUTPATIENT
Start: 2021-04-26 | End: 2021-05-14 | Stop reason: HOSPADM

## 2021-04-26 RX ADMIN — Medication 10 ML: at 20:54

## 2021-04-26 RX ADMIN — SODIUM CHLORIDE: 9 INJECTION, SOLUTION INTRAVENOUS at 07:04

## 2021-04-26 RX ADMIN — HYDROMORPHONE HYDROCHLORIDE 1 MG: 1 INJECTION, SOLUTION INTRAMUSCULAR; INTRAVENOUS; SUBCUTANEOUS at 16:23

## 2021-04-26 RX ADMIN — ENOXAPARIN SODIUM 40 MG: 40 INJECTION SUBCUTANEOUS at 08:19

## 2021-04-26 RX ADMIN — IOPAMIDOL 90 ML: 755 INJECTION, SOLUTION INTRAVENOUS at 22:24

## 2021-04-26 RX ADMIN — ACETAMINOPHEN 1000 MG: 500 TABLET, FILM COATED ORAL at 20:54

## 2021-04-26 RX ADMIN — LAMOTRIGINE 100 MG: 100 TABLET ORAL at 20:54

## 2021-04-26 RX ADMIN — CEFTRIAXONE 1000 MG: 1 INJECTION, POWDER, FOR SOLUTION INTRAMUSCULAR; INTRAVENOUS at 20:54

## 2021-04-26 RX ADMIN — HYDROMORPHONE HYDROCHLORIDE 0.5 MG: 1 INJECTION, SOLUTION INTRAMUSCULAR; INTRAVENOUS; SUBCUTANEOUS at 14:43

## 2021-04-26 RX ADMIN — HYDROMORPHONE HYDROCHLORIDE 0.5 MG: 1 INJECTION, SOLUTION INTRAMUSCULAR; INTRAVENOUS; SUBCUTANEOUS at 02:16

## 2021-04-26 RX ADMIN — TRAMADOL HYDROCHLORIDE 50 MG: 50 TABLET, COATED ORAL at 18:00

## 2021-04-26 RX ADMIN — KETOROLAC TROMETHAMINE 15 MG: 30 INJECTION, SOLUTION INTRAMUSCULAR; INTRAVENOUS at 18:00

## 2021-04-26 RX ADMIN — HYDROMORPHONE HYDROCHLORIDE 1 MG: 1 INJECTION, SOLUTION INTRAMUSCULAR; INTRAVENOUS; SUBCUTANEOUS at 22:44

## 2021-04-26 RX ADMIN — VANCOMYCIN HYDROCHLORIDE 1500 MG: 10 INJECTION, POWDER, LYOPHILIZED, FOR SOLUTION INTRAVENOUS at 21:32

## 2021-04-26 RX ADMIN — OXYCODONE 5 MG: 5 TABLET ORAL at 20:54

## 2021-04-26 RX ADMIN — HYDROMORPHONE HYDROCHLORIDE 0.5 MG: 1 INJECTION, SOLUTION INTRAMUSCULAR; INTRAVENOUS; SUBCUTANEOUS at 11:30

## 2021-04-26 RX ADMIN — KETOROLAC TROMETHAMINE 15 MG: 30 INJECTION, SOLUTION INTRAMUSCULAR; INTRAVENOUS at 03:27

## 2021-04-26 RX ADMIN — LAMOTRIGINE 100 MG: 100 TABLET ORAL at 08:19

## 2021-04-26 RX ADMIN — HYDROMORPHONE HYDROCHLORIDE 1 MG: 1 INJECTION, SOLUTION INTRAMUSCULAR; INTRAVENOUS; SUBCUTANEOUS at 19:37

## 2021-04-26 RX ADMIN — Medication 10 ML: at 03:27

## 2021-04-26 RX ADMIN — ACETAMINOPHEN 650 MG: 325 TABLET ORAL at 02:16

## 2021-04-26 RX ADMIN — LISINOPRIL 20 MG: 20 TABLET ORAL at 08:19

## 2021-04-26 ASSESSMENT — PAIN SCALES - GENERAL
PAINLEVEL_OUTOF10: 7
PAINLEVEL_OUTOF10: 7
PAINLEVEL_OUTOF10: 4
PAINLEVEL_OUTOF10: 7
PAINLEVEL_OUTOF10: 10
PAINLEVEL_OUTOF10: 7
PAINLEVEL_OUTOF10: 7
PAINLEVEL_OUTOF10: 10
PAINLEVEL_OUTOF10: 4
PAINLEVEL_OUTOF10: 8
PAINLEVEL_OUTOF10: 5
PAINLEVEL_OUTOF10: 2
PAINLEVEL_OUTOF10: 3
PAINLEVEL_OUTOF10: 6

## 2021-04-26 ASSESSMENT — PAIN DESCRIPTION - DESCRIPTORS
DESCRIPTORS: BURNING;THROBBING
DESCRIPTORS: CONSTANT;DISCOMFORT;SORE

## 2021-04-26 ASSESSMENT — PAIN - FUNCTIONAL ASSESSMENT
PAIN_FUNCTIONAL_ASSESSMENT: PREVENTS OR INTERFERES SOME ACTIVE ACTIVITIES AND ADLS
PAIN_FUNCTIONAL_ASSESSMENT: PREVENTS OR INTERFERES WITH MANY ACTIVE NOT PASSIVE ACTIVITIES
PAIN_FUNCTIONAL_ASSESSMENT: PREVENTS OR INTERFERES WITH ALL ACTIVE AND SOME PASSIVE ACTIVITIES
PAIN_FUNCTIONAL_ASSESSMENT: PREVENTS OR INTERFERES SOME ACTIVE ACTIVITIES AND ADLS
PAIN_FUNCTIONAL_ASSESSMENT: PREVENTS OR INTERFERES WITH ALL ACTIVE AND SOME PASSIVE ACTIVITIES
PAIN_FUNCTIONAL_ASSESSMENT: PREVENTS OR INTERFERES WITH MANY ACTIVE NOT PASSIVE ACTIVITIES

## 2021-04-26 ASSESSMENT — PAIN DESCRIPTION - PAIN TYPE
TYPE: ACUTE PAIN

## 2021-04-26 ASSESSMENT — PAIN DESCRIPTION - ORIENTATION
ORIENTATION: RIGHT;LEFT
ORIENTATION: RIGHT;LEFT
ORIENTATION: MID;LOWER
ORIENTATION: RIGHT;LEFT
ORIENTATION: MID;LOWER
ORIENTATION: RIGHT;LEFT

## 2021-04-26 ASSESSMENT — PAIN DESCRIPTION - PROGRESSION
CLINICAL_PROGRESSION: NOT CHANGED
CLINICAL_PROGRESSION: GRADUALLY WORSENING
CLINICAL_PROGRESSION: RAPIDLY WORSENING
CLINICAL_PROGRESSION: GRADUALLY WORSENING
CLINICAL_PROGRESSION: NOT CHANGED
CLINICAL_PROGRESSION: GRADUALLY WORSENING

## 2021-04-26 ASSESSMENT — PAIN DESCRIPTION - ONSET
ONSET: ON-GOING
ONSET: ON-GOING
ONSET: PROGRESSIVE
ONSET: ON-GOING

## 2021-04-26 ASSESSMENT — PAIN DESCRIPTION - FREQUENCY
FREQUENCY: CONTINUOUS

## 2021-04-26 ASSESSMENT — PAIN DESCRIPTION - LOCATION
LOCATION: BACK
LOCATION: BACK;LEG
LOCATION: BACK
LOCATION: BACK;LEG

## 2021-04-26 NOTE — PROGRESS NOTES
OT consult received and appreciated. Chart reviewed. Will hold evaluation due to MRI and NS recommendations pending . Will evaluate at a later time. Thank you.  Yue Jacobson, OTR/L #[id number]

## 2021-04-26 NOTE — CARE COORDINATION
Met with the pt at the bedside to discuss transition of care. The pt lives with his wife and is independent. PCP is Dr Ric Vigil. The pt does not have a history of HHC. For MRI today. NS consulted. Will follow.  Sundar Sun [phone]

## 2021-04-26 NOTE — PROGRESS NOTES
Pharmacy Consultation Note  (Antibiotic Dosing and Monitoring)       Vancomycin has been discontinued   [address] Physician to re-consult pharmacy if future dosing is needed    Thank you for the consult,    Macarena Ramirez, PharmD, BCPS 4/26/2021 8:35 AM

## 2021-04-26 NOTE — PROGRESS NOTES
Date: 2021       Patient Name: Colin Reyes  : 1965      MRN: [id number]    PT order received and chart reviewed. PT evaluation held, await results of pending imaging.    Will follow up as appropriate    Javier Hurst PT, DPT  [id number]

## 2021-04-26 NOTE — H&P
2 times daily  lamoTRIgine (LAMICTAL) 100 MG tablet, Take 100 mg by mouth 2 times daily  metFORMIN (GLUCOPHAGE) 1000 MG tablet, Take 1,000 mg by mouth 2 times daily (with meals)  pioglitazone (ACTOS) 15 MG tablet, Take 15 mg by mouth daily  traMADol (ULTRAM) 50 MG tablet, Take 50 mg by mouth every 6 hours as needed for Pain. Note that the patient's home medications were reviewed and the above list is accurate to the best of my knowledge at the time of the exam.    Allergies:    Patient has no known allergies. Social History:    reports that he has been smoking. He has a 5.50 pack-year smoking history. He has never used smokeless tobacco. He reports that he does not drink alcohol or use drugs. Family History:   No fhx spine osteo-discitis      PHYSICAL EXAM:    Vitals:  /67   Pulse 86   Temp 99 °F (37.2 °C) (Temporal)   Resp 18   Ht 6' (1.829 m)   Wt 266 lb (120.7 kg)   SpO2 94%   BMI 36.08 kg/m²       General appearance: NAD, conversant  Eyes: Sclerae anicteric, PERRLA  HEENT: AT/NC, poor dentition  Neck: FROM, supple, no thyromegaly  Lymph: No cervical / supraclavicular lymphadenopathy  Lungs: Clear to auscultation, WOB normal  CV: RRR, no MRGs, no lower extremity edema  Abdomen: Soft, non-tender; no masses or HSM, +BS  Extremities: FROM without synovitis. No clubbing or cyanosis of the hands. Back: Slight R paraspinous upper lumbar tenderness, FROM, no midline tenderness  Skin: no rash, induration, lesions, or ulcers  Psych: Calm and cooperative. Normal judgement and insight. Normal mood and affect. Neuro: Alert and interactive, face symmetric, speech fluent. Hip flexors, foot plantar flexors, foot dorsiflexors all 5 out of 5 bilaterally. Sensation to light touch intact in bilateral lower extremities. LABS:  All labs reviewed.   Of note:  CBC with Differential:    Lab Results   Component Value Date    WBC 10.0 04/26/2021    RBC 5.22 04/26/2021    HGB 14.3 04/26/2021    HCT 45.4 04/26/2021     04/26/2021    MCV 87.0 04/26/2021    MCH 27.4 04/26/2021    MCHC 31.5 04/26/2021    RDW 13.4 04/26/2021    LYMPHOPCT 34.4 04/26/2021    MONOPCT 9.2 04/26/2021    BASOPCT 0.9 04/26/2021    MONOSABS 0.92 04/26/2021    LYMPHSABS 3.45 04/26/2021    EOSABS 0.41 04/26/2021    BASOSABS 0.09 04/26/2021     CMP:    Lab Results   Component Value Date     04/25/2021    K 4.9 04/25/2021    CL 94 04/25/2021    CO2 23 04/25/2021    BUN 16 04/25/2021    CREATININE 0.7 04/25/2021    GFRAA >60 04/25/2021    LABGLOM >60 04/25/2021    GLUCOSE 196 04/25/2021    PROT 8.0 04/25/2021    LABALBU 4.0 04/25/2021    CALCIUM 10.0 04/25/2021    BILITOT 0.3 04/25/2021    ALKPHOS 98 04/25/2021    AST 11 04/25/2021    ALT 13 04/25/2021       Imaging:  I've personally reviewed the patient's L-spine XR:   1. There are partially imaged erosive changes at the T11-T12 disc space with   mild height loss of the T12 vertebral body.  These findings may be remote   posttraumatic and/or degenerative in etiology, however chronic/prior disc   osteomyelitis can also have a similar appearance.  Recommend correlation with   patient's symptoms to exclude concern for active infection. 2. There are minimal osseous degenerative changes in the lumbar spine. ASSESSMENT/PLAN:  Principal Problem:    Thoracic back pain  Active Problems:    Hyponatremia    Diabetes mellitus (HCC)    Tobacco abuse  Resolved Problems:    * No resolved hospital problems. *      Do not think he needs to be urgently covered with IV antibiotics yet. I'm not sure there is strong evidence of infection based solely on mild leukocytosis and a plain film. CRP/ESR    MRI T/L spine    IV fluids and hold HCTZ for hyponatremia    SSI + check A1C    Nicotine patch    [addendum] CRP/ESR moderately elevated and his DM2 is actually more than \"borderline\" which increases risk of infection. MRI upgraded to stat.   If imaging is quite delayed, will resume empiric abx, otherwise I will just wait for the imaging.     Code status: Full  Requires inpatient level of care  Herb Mantilla    6:57 AM  4/26/2021

## 2021-04-26 NOTE — PROGRESS NOTES
Pharmacy Consultation Note  (Antibiotic Dosing and Monitoring)    Initial consult date: 4/25/21  Consulting physician: BLESSING Palencia  Drug: Vancomycin  Indication: Concern for discitis    Age/  Gender Height Weight IBW Dosing weight  Allergy Information   55 y.o./male 6' 0\" 120.7 kg   Ideal body weight: 77.6 kg (171 lb 1.2 oz)  Adjusted ideal body weight: 94.8 kg (209 lb 0.7 oz)  94.8 kg  Patient has no known allergies. @[facility](23)@        Date  WBC BUN SCr CrCl  (mL/min) Drug/Dose Time   Given Level(s)   (Time) Comments   4/25 12.5 16 0.7  >120 mL/min   Vancomycin 2500 mg IV once  <2300>     4/26     Vancomycin 1500 mg IV q12h <1100>                                 Intake/Output Summary (Last 24 hours) at 4/25/2021 2152  Last data filed at 4/25/2021 2023  Gross per 24 hour   Intake 10 ml   Output --   Net 10 ml       Historical Cultures:  No results found for: ORG  No results for input(s): BC in the last 72 hours. Cultures:  available culture and sensitivity results were reviewed in EPIC    Assessment:  48 y.o. male has been initiated Vancomycin due to concern for discitis. Estimated CrCl = >120 mL/min  Goal trough level = 15-20 mcg/mL    Plan:   Will initiate vancomycin at a dose of 2500 mg IV once, followed by 1500 mg IV q12h  Monitor renal function   Clinical pharmacy to follow      [facility] 4/25/2021 9:52 PM

## 2021-04-27 PROBLEM — E27.8 ADRENAL NODULE (HCC): Chronic | Status: ACTIVE | Noted: 2021-04-27

## 2021-04-27 LAB
ANION GAP SERPL CALCULATED.3IONS-SCNC: 10 MMOL/L (ref 7–16)
BASOPHILS ABSOLUTE: 0.07 E9/L (ref 0–0.2)
BASOPHILS RELATIVE PERCENT: 0.8 % (ref 0–2)
BUN BLDV-MCNC: 15 MG/DL (ref 6–20)
CALCIUM SERPL-MCNC: 9.4 MG/DL (ref 8.6–10.2)
CHLORIDE BLD-SCNC: 99 MMOL/L (ref 98–107)
CO2: 27 MMOL/L (ref 22–29)
CREAT SERPL-MCNC: 0.7 MG/DL (ref 0.7–1.2)
EOSINOPHILS ABSOLUTE: 0.37 E9/L (ref 0.05–0.5)
EOSINOPHILS RELATIVE PERCENT: 4.2 % (ref 0–6)
GFR AFRICAN AMERICAN: >60
GFR NON-AFRICAN AMERICAN: >60 ML/MIN/1.73
GLUCOSE BLD-MCNC: 150 MG/DL (ref 74–99)
HCT VFR BLD CALC: 44.3 % (ref 37–54)
HEMOGLOBIN: 13.7 G/DL (ref 12.5–16.5)
IMMATURE GRANULOCYTES #: 0.02 E9/L
IMMATURE GRANULOCYTES %: 0.2 % (ref 0–5)
LYMPHOCYTES ABSOLUTE: 3.66 E9/L (ref 1.5–4)
LYMPHOCYTES RELATIVE PERCENT: 41.2 % (ref 20–42)
MCH RBC QN AUTO: 26.6 PG (ref 26–35)
MCHC RBC AUTO-ENTMCNC: 30.9 % (ref 32–34.5)
MCV RBC AUTO: 85.9 FL (ref 80–99.9)
METER GLUCOSE: 150 MG/DL (ref 74–99)
METER GLUCOSE: 182 MG/DL (ref 74–99)
METER GLUCOSE: 245 MG/DL (ref 74–99)
MONOCYTES ABSOLUTE: 0.77 E9/L (ref 0.1–0.95)
MONOCYTES RELATIVE PERCENT: 8.7 % (ref 2–12)
NEUTROPHILS ABSOLUTE: 4 E9/L (ref 1.8–7.3)
NEUTROPHILS RELATIVE PERCENT: 44.9 % (ref 43–80)
PDW BLD-RTO: 13.1 FL (ref 11.5–15)
PLATELET # BLD: 296 E9/L (ref 130–450)
PMV BLD AUTO: 9.8 FL (ref 7–12)
POTASSIUM SERPL-SCNC: 4.3 MMOL/L (ref 3.5–5)
RBC # BLD: 5.16 E12/L (ref 3.8–5.8)
SODIUM BLD-SCNC: 136 MMOL/L (ref 132–146)
URINE CULTURE, ROUTINE: NORMAL
VITAMIN D 25-HYDROXY: 22 NG/ML (ref 30–100)
WBC # BLD: 8.9 E9/L (ref 4.5–11.5)

## 2021-04-27 PROCEDURE — 80048 BASIC METABOLIC PNL TOTAL CA: CPT

## 2021-04-27 PROCEDURE — 82306 VITAMIN D 25 HYDROXY: CPT

## 2021-04-27 PROCEDURE — 1200000000 HC SEMI PRIVATE

## 2021-04-27 PROCEDURE — 97165 OT EVAL LOW COMPLEX 30 MIN: CPT

## 2021-04-27 PROCEDURE — 6370000000 HC RX 637 (ALT 250 FOR IP): Performed by: INTERNAL MEDICINE

## 2021-04-27 PROCEDURE — 97535 SELF CARE MNGMENT TRAINING: CPT

## 2021-04-27 PROCEDURE — 82962 GLUCOSE BLOOD TEST: CPT

## 2021-04-27 PROCEDURE — 85025 COMPLETE CBC W/AUTO DIFF WBC: CPT

## 2021-04-27 PROCEDURE — 6360000002 HC RX W HCPCS: Performed by: INTERNAL MEDICINE

## 2021-04-27 PROCEDURE — 36415 COLL VENOUS BLD VENIPUNCTURE: CPT

## 2021-04-27 PROCEDURE — 97161 PT EVAL LOW COMPLEX 20 MIN: CPT

## 2021-04-27 PROCEDURE — 97530 THERAPEUTIC ACTIVITIES: CPT

## 2021-04-27 PROCEDURE — 2580000003 HC RX 258: Performed by: PHYSICIAN ASSISTANT

## 2021-04-27 PROCEDURE — 6370000000 HC RX 637 (ALT 250 FOR IP): Performed by: PHYSICIAN ASSISTANT

## 2021-04-27 PROCEDURE — 6360000002 HC RX W HCPCS: Performed by: PHYSICIAN ASSISTANT

## 2021-04-27 RX ORDER — GABAPENTIN 300 MG/1
300 CAPSULE ORAL 3 TIMES DAILY
Status: DISCONTINUED | OUTPATIENT
Start: 2021-04-27 | End: 2021-05-02

## 2021-04-27 RX ADMIN — OXYCODONE 5 MG: 5 TABLET ORAL at 20:16

## 2021-04-27 RX ADMIN — OXYCODONE 5 MG: 5 TABLET ORAL at 14:16

## 2021-04-27 RX ADMIN — INSULIN LISPRO 2 UNITS: 100 INJECTION, SOLUTION INTRAVENOUS; SUBCUTANEOUS at 18:09

## 2021-04-27 RX ADMIN — Medication 10 ML: at 06:55

## 2021-04-27 RX ADMIN — HYDROMORPHONE HYDROCHLORIDE 1 MG: 1 INJECTION, SOLUTION INTRAMUSCULAR; INTRAVENOUS; SUBCUTANEOUS at 20:16

## 2021-04-27 RX ADMIN — GABAPENTIN 300 MG: 300 CAPSULE ORAL at 23:32

## 2021-04-27 RX ADMIN — OXYCODONE 5 MG: 5 TABLET ORAL at 23:56

## 2021-04-27 RX ADMIN — Medication 10 ML: at 01:58

## 2021-04-27 RX ADMIN — HYDROMORPHONE HYDROCHLORIDE 1 MG: 1 INJECTION, SOLUTION INTRAMUSCULAR; INTRAVENOUS; SUBCUTANEOUS at 01:58

## 2021-04-27 RX ADMIN — ACETAMINOPHEN 1000 MG: 500 TABLET, FILM COATED ORAL at 20:16

## 2021-04-27 RX ADMIN — Medication 10 ML: at 23:32

## 2021-04-27 RX ADMIN — OXYCODONE 5 MG: 5 TABLET ORAL at 05:37

## 2021-04-27 RX ADMIN — Medication 10 ML: at 08:57

## 2021-04-27 RX ADMIN — KETOROLAC TROMETHAMINE 30 MG: 30 INJECTION, SOLUTION INTRAMUSCULAR; INTRAVENOUS at 14:19

## 2021-04-27 RX ADMIN — HYDROMORPHONE HYDROCHLORIDE 1 MG: 1 INJECTION, SOLUTION INTRAMUSCULAR; INTRAVENOUS; SUBCUTANEOUS at 16:09

## 2021-04-27 RX ADMIN — KETOROLAC TROMETHAMINE 30 MG: 30 INJECTION, SOLUTION INTRAMUSCULAR; INTRAVENOUS at 08:57

## 2021-04-27 RX ADMIN — LAMOTRIGINE 100 MG: 100 TABLET ORAL at 20:16

## 2021-04-27 RX ADMIN — ACETAMINOPHEN 1000 MG: 500 TABLET, FILM COATED ORAL at 08:52

## 2021-04-27 RX ADMIN — OXYCODONE 5 MG: 5 TABLET ORAL at 09:54

## 2021-04-27 RX ADMIN — OXYCODONE 5 MG: 5 TABLET ORAL at 01:08

## 2021-04-27 RX ADMIN — KETOROLAC TROMETHAMINE 30 MG: 30 INJECTION, SOLUTION INTRAMUSCULAR; INTRAVENOUS at 02:29

## 2021-04-27 RX ADMIN — HYDROMORPHONE HYDROCHLORIDE 1 MG: 1 INJECTION, SOLUTION INTRAMUSCULAR; INTRAVENOUS; SUBCUTANEOUS at 06:55

## 2021-04-27 RX ADMIN — Medication 10 ML: at 20:14

## 2021-04-27 RX ADMIN — INSULIN LISPRO 2 UNITS: 100 INJECTION, SOLUTION INTRAVENOUS; SUBCUTANEOUS at 12:30

## 2021-04-27 RX ADMIN — HYDROMORPHONE HYDROCHLORIDE 1 MG: 1 INJECTION, SOLUTION INTRAMUSCULAR; INTRAVENOUS; SUBCUTANEOUS at 11:15

## 2021-04-27 RX ADMIN — GABAPENTIN 300 MG: 300 CAPSULE ORAL at 18:08

## 2021-04-27 RX ADMIN — KETOROLAC TROMETHAMINE 30 MG: 30 INJECTION, SOLUTION INTRAMUSCULAR; INTRAVENOUS at 20:16

## 2021-04-27 RX ADMIN — HYDROMORPHONE HYDROCHLORIDE 1 MG: 1 INJECTION, SOLUTION INTRAMUSCULAR; INTRAVENOUS; SUBCUTANEOUS at 23:32

## 2021-04-27 RX ADMIN — ACETAMINOPHEN 1000 MG: 500 TABLET, FILM COATED ORAL at 14:19

## 2021-04-27 RX ADMIN — ENOXAPARIN SODIUM 40 MG: 40 INJECTION SUBCUTANEOUS at 08:52

## 2021-04-27 RX ADMIN — LAMOTRIGINE 100 MG: 100 TABLET ORAL at 08:52

## 2021-04-27 RX ADMIN — LISINOPRIL 20 MG: 20 TABLET ORAL at 08:52

## 2021-04-27 ASSESSMENT — PAIN DESCRIPTION - ONSET
ONSET: ON-GOING

## 2021-04-27 ASSESSMENT — PAIN DESCRIPTION - FREQUENCY
FREQUENCY: CONTINUOUS

## 2021-04-27 ASSESSMENT — PAIN SCALES - GENERAL
PAINLEVEL_OUTOF10: 4
PAINLEVEL_OUTOF10: 5
PAINLEVEL_OUTOF10: 8
PAINLEVEL_OUTOF10: 8
PAINLEVEL_OUTOF10: 9
PAINLEVEL_OUTOF10: 4
PAINLEVEL_OUTOF10: 8
PAINLEVEL_OUTOF10: 5
PAINLEVEL_OUTOF10: 8
PAINLEVEL_OUTOF10: 5
PAINLEVEL_OUTOF10: 7
PAINLEVEL_OUTOF10: 5
PAINLEVEL_OUTOF10: 7
PAINLEVEL_OUTOF10: 9
PAINLEVEL_OUTOF10: 0
PAINLEVEL_OUTOF10: 5
PAINLEVEL_OUTOF10: 5
PAINLEVEL_OUTOF10: 4
PAINLEVEL_OUTOF10: 5
PAINLEVEL_OUTOF10: 3
PAINLEVEL_OUTOF10: 0
PAINLEVEL_OUTOF10: 7

## 2021-04-27 ASSESSMENT — PAIN DESCRIPTION - PAIN TYPE
TYPE: ACUTE PAIN

## 2021-04-27 ASSESSMENT — PAIN DESCRIPTION - DESCRIPTORS
DESCRIPTORS: ACHING;BURNING;SHARP
DESCRIPTORS: ACHING;BURNING;SHARP
DESCRIPTORS: ACHING;DISCOMFORT;SORE
DESCRIPTORS: ACHING;DISCOMFORT;SHARP
DESCRIPTORS: ACHING;BURNING;SHARP
DESCRIPTORS: ACHING;DISCOMFORT;SORE
DESCRIPTORS: ACHING;BURNING;SHARP
DESCRIPTORS: ACHING;DISCOMFORT;SORE

## 2021-04-27 ASSESSMENT — PAIN DESCRIPTION - LOCATION
LOCATION: BACK

## 2021-04-27 ASSESSMENT — PAIN DESCRIPTION - ORIENTATION
ORIENTATION: MID

## 2021-04-27 NOTE — PROGRESS NOTES
Physical Therapy      Physical Therapy Initial Assessment     Name: Alexandra Méndez  : 1965  MRN: [id number]    Referring Provider:  BLESSING Suárez    Date of Service: 2021    Evaluating PT:  Katherine Perez PT, DPT PT [id number]    Room #:  4667/2941-I  Diagnosis:  Thoracic Back Pain  HPI: CT T spine: 1. Subacute T12 compression fracture demonstrating 30% loss of height. - Cleared to perform PT evaluation to tolerance per Dr. Markel Maya [employer]  PMHx/PSHx:  Arthritis, DM, HTN  Procedure/Surgery:    Precautions:  Spinal Neutrality, Falls  Equipment Needs:  [employer]    SUBJECTIVE:    Pt lives with spouse in a 1 story home with ramped entrance. Pt ambulated with no device independently PTA. Equipment Owned:     OBJECTIVE:   Initial Evaluation  Date:  Treatment Short Term/ Long Term   Goals   AM-PAC 6 Clicks 48/01     Was pt agreeable to Eval/treatment? Yes     Does pt have pain? Mild pain in supine position. Reports direction of preference (flexion)     Bed Mobility  Rolling: Independent  Supine to sit: Independent  Sit to supine: Independent  Scooting: Independent    Rolling: Independent  Supine to sit:  Independent  Sit to supine: Independent  Scooting: Independent     Transfers Sit to stand: Supervision  Stand to sit: Supervision  Stand pivot: Supervision  Sit to stand: Modified Independent  Stand to sit: Modified Independent  Stand pivot: Modified Independent     Ambulation    300 feet with Supervision  >150 feet with Modified Independent     Stair negotiation: ascended and descended  not a goal of care   Ramped entrance   ROM BUE:  See OT Note  BLE:  WFL     Strength BUE:  See OT Note  BLE:  4/5  Improve Strength 1/3 MMT Grade   Balance Sitting EOB: Independent    Dynamic Standing:  Supervision [employer]  Sitting EOB:  Independent    Dynamic Standing:  Modified Independent       Pt is A & O x 4  Sensation:  Pt denies numbness and tingling to extremities  Edema:  WNL    Patient education  Pt educated on role of PT, spinal neutrality. Patient response to education:   Pt verbalized understanding Pt demonstrated skill Pt requires further education in this area   x x x     ASSESSMENT:    Comments:  Pt received in bathroom agreeable to PT evaluation. Pt ambulated from bathroom to bed without assist.  Pt educated on spinal neutrality. Pt reports direction of preference (flexion). Pt performing all mobility without physical assistance. Pt reports ease of mobility using [employer]. Patient would benefit from continued skilled PT to maximize functional mobility independence. Treatment:  Patient practiced and was instructed in the following treatment:     Bed mobility- verbal cues to facilitate independence   Functional transfers-Verbal cues for proper positioning and sequencing to perform transfers safely with maximum independence.  Gait training- Verbal cues for proper positioning and sequencing using assistive device to maximize functional mobility independence. Pt's/ family goals   1. Get better    Patient and or family understand(s) diagnosis, prognosis, and plan of care. yes    PLAN:      PLAN OF CARE:    Current Treatment Recommendations     [x] Strengthening     [x] ROM   [x] Balance Training   [x] Endurance Training   [x] Transfer Training   [x] Gait Training   [] Stair Training   [x] Positioning   [x] Safety and Education Training   [x] Patient/Caregiver Education   [x] HEP  [] Other       PT care will be provided in accordance with the objectives noted above. Exercises and functional mobility practice will be used as well as appropriate assistive devices or modalities to obtain goals. Patient and family education will also be administered as needed. Frequency of treatments: 5-7x/week x 1-2 weeks.     Time in  1445  Time out  1507    Total Treatment Time  8 minutes     Evaluation Time includes thorough review of current medical information, gathering information on past medical history/social history and prior level of function, completion of standardized testing/informal observation of tasks, assessment of data and education on plan of care and goals.     CPT codes:  [x] Low Complexity PT evaluation 16939  [] Moderate Complexity PT evaluation 67154  [] High Complexity PT evaluation 50957  [] PT Re-evaluation 38606  [] Gait training 05802 0 minutes  [] Manual therapy 74451 0 minutes  [x] Therapeutic activities 58054 8 minutes  [] Therapeutic exercises 51638 0 minutes  [] Neuromuscular reeducation 94386 0 minutes       [facility] PT, DPT   [id number]

## 2021-04-27 NOTE — PROGRESS NOTES
Chief Complaint:  Chief Complaint   Patient presents with    Back Pain     middle back; \"feels like a knot\"    Leg Pain     both thighs     Thoracic back pain     Subjective:    He continues to have low back pain radiating into both anterior thighs without weakness, numbness, or bowel/bladder dysfunction. He is anxious about the cost of his care because he is uninsured. Due to his size and discomfort, he was unable to tolerate MRI T/L spine, so I ordered CT to more quickly rule out infection last night. Objective:    BP (!) 144/73   Pulse 85   Temp 97.1 °F (36.2 °C) (Temporal)   Resp 18   Ht 6' (1.829 m)   Wt 278 lb (126.1 kg)   SpO2 94%   BMI 37.70 kg/m²     Current medications that patient is taking have been reviewed. General appearance: NAD, conversant  HEENT: AT/NC, MMM  Neck: FROM, supple  Lungs: Clear to auscultation, WOB normal  CV: RRR, no MRGs  Abdomen: Soft, non-tender; no masses or HSM, +BS  Extremities: No peripheral edema or digital cyanosis  Skin: no rash, lesions or ulcers  Psych: Calm and cooperative  Neuro: Alert and interactive, face symmetric, moving all extremities, speech fluent. Strength 5/5 in B/L LE's.     Labs:  CBC with Differential:    Lab Results   Component Value Date    WBC 8.9 04/27/2021    RBC 5.16 04/27/2021    HGB 13.7 04/27/2021    HCT 44.3 04/27/2021     04/27/2021    MCV 85.9 04/27/2021    MCH 26.6 04/27/2021    MCHC 30.9 04/27/2021    RDW 13.1 04/27/2021    LYMPHOPCT 41.2 04/27/2021    MONOPCT 8.7 04/27/2021    BASOPCT 0.8 04/27/2021    MONOSABS 0.77 04/27/2021    LYMPHSABS 3.66 04/27/2021    EOSABS 0.37 04/27/2021    BASOSABS 0.07 04/27/2021     CMP:    Lab Results   Component Value Date     04/27/2021    K 4.3 04/27/2021    K 4.7 04/26/2021    CL 99 04/27/2021    CO2 27 04/27/2021    BUN 15 04/27/2021    CREATININE 0.7 04/27/2021    GFRAA >60 04/27/2021    LABGLOM >60 04/27/2021    GLUCOSE 150 04/27/2021    PROT 8.0 04/25/2021    LABALBU 4.0 04/25/2021    CALCIUM 9.4 04/27/2021    BILITOT 0.3 04/25/2021    ALKPHOS 98 04/25/2021    AST 11 04/25/2021    ALT 13 04/25/2021        Imaging:  I've personally reviewed the patient's CT T-spine  1. Subacute T12 compression fracture demonstrating 30% loss of height. 2. Mild spinal canal stenosis at T11-T12 secondary to 4 mm of retropulsion of   the posterior margin of the T12 vertebral body into the spinal canal.   3. No findings to suggest discitis/osteomyelitis. 4. Indeterminate 2.2 cm right adrenal nodule for which an MRI with contrast   is recommended for further characterization. Assessment/Plan:  Principal Problem:    Thoracic back pain  Active Problems:    Hyponatremia    Diabetes mellitus (HCC)    Tobacco abuse    Adrenal nodule (HCC) - incidental  Resolved Problems:    * No resolved hospital problems. *       My suspicion for infection is quite a bit lower    Stay off abx for now. I think he will still need an MRI    Neurosurgery consult    If NSurg also concurs that he will need MR, will arrange for open MRI    I did T-spine CT last night since needed imaging in timely fashion; however, might be worth extending down to L-spine due to his thigh symptoms, if we are unable to get MR in timely fashion    Nicotine patch    Glucose OK generally. Continue SSI. A1C not well controlled at 8.6%.     Requires continued inpatient level of care       Jono Merida    2:55 PM  4/27/2021

## 2021-04-27 NOTE — CARE COORDINATION
Discharge plan is to return home when medically stable. The pt does not have health insurance. He will need to use [facility]) DME.  Meena Cleveland -[phone]

## 2021-04-27 NOTE — PROGRESS NOTES
OT BEDSIDE TREATMENT NOTE      Date:2021  Patient Name: Benjie Evangelista  MRN: [id number]  : 1965  Room: [id number]     OT consult received & appreciated, chart reviewed, currently awaiting MRI & further recommendations/ POC. Will follow accordingly. Thank you,  Sotero Izquierdo.  Rosa, OTR/L   License #  [id number]

## 2021-04-27 NOTE — PROGRESS NOTES
Pharmacy Consultation Note  (Antibiotic Dosing and Monitoring)       Vancomycin has been discontinued   [address] to [address] Physician to re-consult pharmacy if future dosing is needed    Thank you for the consult,    Ruben Cota, PharmD, BCPS 4/27/2021 8:32 AM

## 2021-04-27 NOTE — PROGRESS NOTES
Left a message for dr Mabel Ayon about patient requesting gabapentin,  mentioned during bedside visit, no new orders at this time

## 2021-04-27 NOTE — PROGRESS NOTES
4/27- Spoke to Dr. Danitza Che about patient not being able to tolerate MRIs due to pain and being tight in scanner and we spoke about possibly sending patient to [facility].

## 2021-04-27 NOTE — PROGRESS NOTES
Pharmacy Consultation Note  (Antibiotic Dosing and Monitoring)    Initial consult date: 21  Consulting physician: Dr. Vivien Linares  Drug: Vancomycin  Indication: Concern for discitis     Age/  Gender Height Weight IBW Dosing weight  Allergy Information   55 y.o./male 6' (182.9 cm) 266 lb (120.7 kg)     Ideal body weight: 77.6 kg (171 lb 1.2 oz)  Adjusted ideal body weight: 94.7 kg (208 lb 13.5 oz)  94.7kg  Patient has no known allergies. Temp (24hrs), Av.1 °F (36.7 °C), Min:97.1 °F (36.2 °C), Max:99 °F (37.2 °C)          Date  WBC BUN SCr CrCl  (mL/min) Drug/Dose Time   Given Level(s)   (Time) Comments   21 10 14 0.8  141   Vancomycin 2500 mg IV  21 @ 2300                                            Intake/Output Summary (Last 24 hours) at 2021 2108  Last data filed at 2021 1940  Gross per 24 hour   Intake 490 ml   Output --   Net 490 ml       Historical Cultures:  No results found for: ORG  Recent Labs     21  1715   BC 24 Hours no growth       Cultures:  available culture and sensitivity results were reviewed in [facility]    Assessment:  · 48 y.o. male has been initiated Vancomycin.   · Estimated CrCl = 141 mL/min  · Goal trough level = 15-20 mcg/mL    Plan:  · Will initiate vancomycin at a dose of 1500mg Q 12 hours starting 21 @ 2130  · Monitor renal function   · Clinical pharmacy to follow      CHATO Armendariz [facility] 2021 9:08 PM

## 2021-04-27 NOTE — PROGRESS NOTES
OCCUPATIONAL THERAPY INITIAL EVALUATION      Date:2021  Patient Name: Dyan Bergman  MRN: [id number]  : 1965  Room: [address] [place] [address]  Referring Provider:  BLESSING [place]    Evaluating OT: Elisabeth Lee OTR/L   License #  [id number]     AM-PAC Daily Activity Raw Score: 19    Recommended Adaptive Equipment:  A.E. issued this day      Diagnosis: Back Pain  CT T spine:  1. Subacute T12 compression fracture demonstrating 30% loss of height. 2. Mild spinal canal stenosis at T11-T12 secondary to 4 mm of retropulsion of   the posterior margin of the T12 vertebral body into the spinal canal.     Surgery:   Past Surgical History:   Procedure Laterality Date    CARPAL TUNNEL RELEASE Right 2012    NECK SURGERY  ,    cervical 2-6, fusion    WRIST ARTHROSCOPY  14    right with decompression      Pertinent Medical History:   Past Medical History:   Diagnosis Date    Arthritis     right wrist    Diabetes mellitus ([address]     borderline, tries to watch diet    Difficult intubation     Hypertension     bp has been sporadic    Infected tooth 2014    saw dentist, placed on antibiotic, to call Dr. Yuan Severe office to inform    Pain     chronic, goes to pain clinic, placed on Methodone    Preoperative clearance 2014    dental- Dr. Radha Jaimes; paper copy on chart      Precautions:  Falls, neutral spine for comfort (no B,L,T)  Dr. Mariam Arce: states OK for OT/PT evals this day     Home Living: Pt lives with wife in a 1 story home with no steps to enter and no HR, ramp to enter. .  Bathroom setup: tub/shower, std. Commodes, walk in shower in basement   Equipment owned: none    Prior Level of Function: Ind. with ADLs , Ind. with IADLs; ambulated no A.D.   Driving: active  Occupation:     Pain Level: 5-6/10 B upper legs  Cognition: A&O: 4/4; Follows 2 step directions   Memory:  good   Sequencing:  good   Problem solving:  good   Judgement/safety:  Fair/good     Functional Assessment:   Initial Eval Status  Date: 4- Treatment Status  Date: 4-27-21 Short Term Goals = Long Term Goals  Treatment frequency: 3-5 days   Feeding Ind. Grooming Supervision standing at sink  Modified [place]    UB Dressing Supervision   Modified [place]    LB Dressing Minimal Assist  Increased to set up with use of A.E. Issued & instructed this day. Modified [place]    Bathing Min A with sim. task Issued LH sponge Modified [place]    Toileting Supervision   Modified [place]    Bed Mobility  Supine to sit: Ind. Sit to supine: Ind. Functional Transfers Supervision with sit <> stand, SPT, commode transfer   Modified [place]    Functional Mobility Supervision with use of ww greater than household distances  Modified [place]    Balance Sitting:     Static:  Ind. Dynamic:Sup  Standing: Sup     Activity Tolerance Fair with mod. Ax.  Good with mod. Ax. Visual/  Perceptual Glasses: yes        Safety Awareness Fair/good                    good     Hand dominance: R     Strength ROM Additional Info:    RUE  Grossly WFL WFL good  and wfl FMC/dexterity noted during ADL tasks     LUE Grossly WFL  WFL good  and wfl FMC/dexterity noted during ADL tasks       Hearing: WFL   Sensation:  Pt. c/o no numbness/ tingling B UE  Tone: WFL   Edema: none B UE                            Comments: Upon arrival, patient in restroom, cleared by Nursing, agreeable to OT. Pt demonstrating fair+/good understanding of education/techniques, requiring additional training / education. At end of session, patient seated in bedside chair, all needs met, RN notified, with call light and phone within reach, all lines and tubes intact. Pt would benefit from continued skilled OT to increase safety and independence with completion of ADL/iADL tasks, functional transfers/mobility to improve independence and quality of life.     Treatment:  OT services provided include: facilitation of safe home soon      Patient and/or family were instructed on functional diagnosis, prognosis/goals and OT plan of care. Demonstrated fair+/good understanding. Eval Complexity: Low      Time In: 14:50  Time Out: 15:15  Total Treatment Time: 08    Min Units   OT Eval Low 03024  1     OT Eval Medium 66464      OT Eval High 80814       OT Re-Eval [id number]       Therapeutic Ex [id number]       Therapeutic Activities 34390       ADL/Self Care 30655  08  1   Orthotic Management 23613       Neuro Re-Ed 21041       Non-Billable Time          Evaluation Time includes thorough review of current medical information, gathering information on past medical history/social history and prior level of function, completion of standardized testing/informal observation of tasks, assessment of data and education on plan of care and goals. Janie Lee, OTR/L   License #  [id number]

## 2021-04-28 ENCOUNTER — APPOINTMENT (OUTPATIENT)
Dept: MRI IMAGING | Age: 56
DRG: 321 | End: 2021-04-28
Payer: COMMERCIAL

## 2021-04-28 LAB
ALBUMIN SERPL-MCNC: 3.5 G/DL (ref 3.5–5.2)
ALP BLD-CCNC: 76 U/L (ref 40–129)
ALT SERPL-CCNC: 13 U/L (ref 0–40)
ANION GAP SERPL CALCULATED.3IONS-SCNC: 10 MMOL/L (ref 7–16)
AST SERPL-CCNC: 10 U/L (ref 0–39)
BASOPHILS ABSOLUTE: 0.08 E9/L (ref 0–0.2)
BASOPHILS RELATIVE PERCENT: 0.8 % (ref 0–2)
BILIRUB SERPL-MCNC: <0.2 MG/DL (ref 0–1.2)
BUN BLDV-MCNC: 19 MG/DL (ref 6–20)
C-REACTIVE PROTEIN: 1 MG/DL (ref 0–0.4)
CALCIUM SERPL-MCNC: 9.3 MG/DL (ref 8.6–10.2)
CHLORIDE BLD-SCNC: 97 MMOL/L (ref 98–107)
CO2: 26 MMOL/L (ref 22–29)
CREAT SERPL-MCNC: 0.7 MG/DL (ref 0.7–1.2)
EOSINOPHILS ABSOLUTE: 0.35 E9/L (ref 0.05–0.5)
EOSINOPHILS RELATIVE PERCENT: 3.6 % (ref 0–6)
GFR AFRICAN AMERICAN: >60
GFR NON-AFRICAN AMERICAN: >60 ML/MIN/1.73
GLUCOSE BLD-MCNC: 223 MG/DL (ref 74–99)
HCT VFR BLD CALC: 44 % (ref 37–54)
HEMOGLOBIN: 13.6 G/DL (ref 12.5–16.5)
IMMATURE GRANULOCYTES #: 0.03 E9/L
IMMATURE GRANULOCYTES %: 0.3 % (ref 0–5)
LYMPHOCYTES ABSOLUTE: 2.01 E9/L (ref 1.5–4)
LYMPHOCYTES RELATIVE PERCENT: 20.9 % (ref 20–42)
MCH RBC QN AUTO: 26.5 PG (ref 26–35)
MCHC RBC AUTO-ENTMCNC: 30.9 % (ref 32–34.5)
MCV RBC AUTO: 85.6 FL (ref 80–99.9)
METER GLUCOSE: 159 MG/DL (ref 74–99)
METER GLUCOSE: 217 MG/DL (ref 74–99)
MONOCYTES ABSOLUTE: 0.61 E9/L (ref 0.1–0.95)
MONOCYTES RELATIVE PERCENT: 6.3 % (ref 2–12)
NEUTROPHILS ABSOLUTE: 6.53 E9/L (ref 1.8–7.3)
NEUTROPHILS RELATIVE PERCENT: 68.1 % (ref 43–80)
PDW BLD-RTO: 13 FL (ref 11.5–15)
PLATELET # BLD: 309 E9/L (ref 130–450)
PMV BLD AUTO: 9.8 FL (ref 7–12)
POTASSIUM SERPL-SCNC: 4.9 MMOL/L (ref 3.5–5)
RBC # BLD: 5.14 E12/L (ref 3.8–5.8)
SEDIMENTATION RATE, ERYTHROCYTE: 57 MM/HR (ref 0–15)
SODIUM BLD-SCNC: 133 MMOL/L (ref 132–146)
TOTAL PROTEIN: 6.8 G/DL (ref 6.4–8.3)
WBC # BLD: 9.6 E9/L (ref 4.5–11.5)

## 2021-04-28 PROCEDURE — 72146 MRI CHEST SPINE W/O DYE: CPT

## 2021-04-28 PROCEDURE — 85651 RBC SED RATE NONAUTOMATED: CPT

## 2021-04-28 PROCEDURE — 1200000000 HC SEMI PRIVATE

## 2021-04-28 PROCEDURE — 80053 COMPREHEN METABOLIC PANEL: CPT

## 2021-04-28 PROCEDURE — 2580000003 HC RX 258: Performed by: PHYSICIAN ASSISTANT

## 2021-04-28 PROCEDURE — 85025 COMPLETE CBC W/AUTO DIFF WBC: CPT

## 2021-04-28 PROCEDURE — 36415 COLL VENOUS BLD VENIPUNCTURE: CPT

## 2021-04-28 PROCEDURE — 86140 C-REACTIVE PROTEIN: CPT

## 2021-04-28 PROCEDURE — 2700000000 HC OXYGEN THERAPY PER DAY

## 2021-04-28 PROCEDURE — 72148 MRI LUMBAR SPINE W/O DYE: CPT

## 2021-04-28 PROCEDURE — 99222 1ST HOSP IP/OBS MODERATE 55: CPT | Performed by: NEUROLOGICAL SURGERY

## 2021-04-28 PROCEDURE — 82962 GLUCOSE BLOOD TEST: CPT

## 2021-04-28 PROCEDURE — 6370000000 HC RX 637 (ALT 250 FOR IP): Performed by: INTERNAL MEDICINE

## 2021-04-28 PROCEDURE — 6360000002 HC RX W HCPCS: Performed by: INTERNAL MEDICINE

## 2021-04-28 PROCEDURE — 6360000002 HC RX W HCPCS: Performed by: PHYSICIAN ASSISTANT

## 2021-04-28 PROCEDURE — 6370000000 HC RX 637 (ALT 250 FOR IP): Performed by: PHYSICIAN ASSISTANT

## 2021-04-28 RX ORDER — LORAZEPAM 2 MG/ML
0.25 INJECTION INTRAMUSCULAR
Status: COMPLETED | OUTPATIENT
Start: 2021-04-28 | End: 2021-04-28

## 2021-04-28 RX ADMIN — LAMOTRIGINE 100 MG: 100 TABLET ORAL at 10:17

## 2021-04-28 RX ADMIN — KETOROLAC TROMETHAMINE 30 MG: 30 INJECTION, SOLUTION INTRAMUSCULAR; INTRAVENOUS at 04:18

## 2021-04-28 RX ADMIN — GABAPENTIN 300 MG: 300 CAPSULE ORAL at 08:57

## 2021-04-28 RX ADMIN — ACETAMINOPHEN 1000 MG: 500 TABLET, FILM COATED ORAL at 13:27

## 2021-04-28 RX ADMIN — Medication 10 ML: at 20:39

## 2021-04-28 RX ADMIN — HYDROMORPHONE HYDROCHLORIDE 1 MG: 1 INJECTION, SOLUTION INTRAMUSCULAR; INTRAVENOUS; SUBCUTANEOUS at 11:34

## 2021-04-28 RX ADMIN — GABAPENTIN 300 MG: 300 CAPSULE ORAL at 20:37

## 2021-04-28 RX ADMIN — LORAZEPAM 0.25 MG: 2 INJECTION INTRAMUSCULAR; INTRAVENOUS at 16:03

## 2021-04-28 RX ADMIN — GABAPENTIN 300 MG: 300 CAPSULE ORAL at 13:30

## 2021-04-28 RX ADMIN — HYDROMORPHONE HYDROCHLORIDE 1 MG: 1 INJECTION, SOLUTION INTRAMUSCULAR; INTRAVENOUS; SUBCUTANEOUS at 20:38

## 2021-04-28 RX ADMIN — OXYCODONE 5 MG: 5 TABLET ORAL at 04:19

## 2021-04-28 RX ADMIN — ENOXAPARIN SODIUM 40 MG: 40 INJECTION SUBCUTANEOUS at 08:58

## 2021-04-28 RX ADMIN — OXYCODONE 5 MG: 5 TABLET ORAL at 14:22

## 2021-04-28 RX ADMIN — Medication 10 ML: at 09:00

## 2021-04-28 RX ADMIN — ACETAMINOPHEN 1000 MG: 500 TABLET, FILM COATED ORAL at 20:38

## 2021-04-28 RX ADMIN — HYDROMORPHONE HYDROCHLORIDE 1 MG: 1 INJECTION, SOLUTION INTRAMUSCULAR; INTRAVENOUS; SUBCUTANEOUS at 16:03

## 2021-04-28 RX ADMIN — OXYCODONE 5 MG: 5 TABLET ORAL at 10:05

## 2021-04-28 RX ADMIN — HYDROMORPHONE HYDROCHLORIDE 1 MG: 1 INJECTION, SOLUTION INTRAMUSCULAR; INTRAVENOUS; SUBCUTANEOUS at 04:18

## 2021-04-28 RX ADMIN — OXYCODONE 5 MG: 5 TABLET ORAL at 20:38

## 2021-04-28 RX ADMIN — ACETAMINOPHEN 1000 MG: 500 TABLET, FILM COATED ORAL at 08:56

## 2021-04-28 RX ADMIN — LAMOTRIGINE 100 MG: 100 TABLET ORAL at 20:37

## 2021-04-28 RX ADMIN — LISINOPRIL 20 MG: 20 TABLET ORAL at 10:19

## 2021-04-28 ASSESSMENT — PAIN DESCRIPTION - ORIENTATION
ORIENTATION: MID;LOWER
ORIENTATION: LOWER;MID
ORIENTATION: LOWER

## 2021-04-28 ASSESSMENT — PAIN DESCRIPTION - ONSET
ONSET: ON-GOING

## 2021-04-28 ASSESSMENT — PAIN DESCRIPTION - PAIN TYPE
TYPE: ACUTE PAIN

## 2021-04-28 ASSESSMENT — PAIN SCALES - GENERAL
PAINLEVEL_OUTOF10: 4
PAINLEVEL_OUTOF10: 8
PAINLEVEL_OUTOF10: 6
PAINLEVEL_OUTOF10: 0
PAINLEVEL_OUTOF10: 3
PAINLEVEL_OUTOF10: 2
PAINLEVEL_OUTOF10: 6
PAINLEVEL_OUTOF10: 8
PAINLEVEL_OUTOF10: 0
PAINLEVEL_OUTOF10: 7
PAINLEVEL_OUTOF10: 4
PAINLEVEL_OUTOF10: 5
PAINLEVEL_OUTOF10: 10
PAINLEVEL_OUTOF10: 10

## 2021-04-28 ASSESSMENT — PAIN DESCRIPTION - DESCRIPTORS
DESCRIPTORS: BURNING;ACHING;DISCOMFORT
DESCRIPTORS: ACHING;DISCOMFORT;SORE
DESCRIPTORS: ACHING;CONSTANT
DESCRIPTORS: BURNING;ACHING;DISCOMFORT
DESCRIPTORS: ACHING;DISCOMFORT

## 2021-04-28 ASSESSMENT — PAIN DESCRIPTION - LOCATION
LOCATION: BACK
LOCATION: BACK;LEG
LOCATION: BACK

## 2021-04-28 ASSESSMENT — PAIN DESCRIPTION - PROGRESSION: CLINICAL_PROGRESSION: GRADUALLY IMPROVING

## 2021-04-28 ASSESSMENT — PAIN - FUNCTIONAL ASSESSMENT
PAIN_FUNCTIONAL_ASSESSMENT: ACTIVITIES ARE NOT PREVENTED
PAIN_FUNCTIONAL_ASSESSMENT: PREVENTS OR INTERFERES SOME ACTIVE ACTIVITIES AND ADLS

## 2021-04-28 ASSESSMENT — PAIN DESCRIPTION - FREQUENCY
FREQUENCY: INTERMITTENT
FREQUENCY: CONTINUOUS
FREQUENCY: INTERMITTENT
FREQUENCY: INTERMITTENT

## 2021-04-28 NOTE — PLAN OF CARE
Problem: Pain:  Goal: Pain level will decrease  Description: Pain level will decrease  4/28/2021 1849 by Hayder Mejia  Outcome: Met This Shift  4/28/2021 1643 by Hayder Mejia  Outcome: Met This Shift  Goal: Control of acute pain  Description: Control of acute pain  4/28/2021 1849 by Hayder Mejia  Outcome: Met This Shift  4/28/2021 1643 by Hayder Mejia  Outcome: Met This Shift  Goal: Control of chronic pain  Description: Control of chronic pain  4/28/2021 1849 by Hayder Mejia  Outcome: Met This Shift  4/28/2021 1643 by Hayder Mejia  Outcome: Met This Shift     Problem: Falls - Risk of:  Goal: Will remain free from falls  Description: Will remain free from falls  4/28/2021 1849 by Hayder Mejia  Outcome: Met This Shift  4/28/2021 1643 by Hayder Mejia  Outcome: Met This Shift  Goal: Absence of physical injury  Description: Absence of physical injury  4/28/2021 1849 by Hayder Mejia  Outcome: Met This Shift  4/28/2021 1643 by Hayder Mejia  Outcome: Met This Shift     Problem: Skin Integrity:  Goal: Will show no infection signs and symptoms  Description: Will show no infection signs and symptoms  4/28/2021 1849 by Hayder Mejia  Outcome: Met This Shift  4/28/2021 1643 by Hayder Mejia  Outcome: Met This Shift  Goal: Absence of new skin breakdown  Description: Absence of new skin breakdown  4/28/2021 1849 by Hayder Mejia  Outcome: Met This Shift  4/28/2021 1643 by Hayder Mejia  Outcome: Met This Shift     Problem: Neurological  Goal: Maximum potential motor/sensory/cognitive function  4/28/2021 1849 by Hayder Mejia  Outcome: Met This Shift  4/28/2021 1643 by Hayder Mejia  Outcome: Met This Shift

## 2021-04-28 NOTE — PROGRESS NOTES
Chief Complaint:  Chief Complaint   Patient presents with    Back Pain     middle back; \"feels like a knot\"    Leg Pain     both thighs     Thoracic back pain     Subjective:    He continues to have low back pain radiating into both anterior thighs without weakness, numbness, or bowel/bladder dysfunction. He remains quite anxious. Objective:    BP (!) 147/73   Pulse 73   Temp 98.2 °F (36.8 °C) (Temporal)   Resp 22   Ht 6' (1.829 m)   Wt 278 lb (126.1 kg)   SpO2 90%   BMI 37.70 kg/m²     Current medications that patient is taking have been reviewed. General appearance: NAD, conversant  HEENT: AT/NC, MMM  Neck: FROM, supple  Lungs: Clear to auscultation, WOB normal  CV: RRR, no MRGs  Abdomen: Soft, non-tender; no masses or HSM, +BS  Extremities: No peripheral edema or digital cyanosis  Skin: no rash, lesions or ulcers  Psych: Calm and cooperative  Neuro: Alert and interactive, face symmetric, moving all extremities, speech fluent. Strength 5/5 in B/L LE's.     Labs:  CBC with Differential:    Lab Results   Component Value Date    WBC 8.9 04/27/2021    RBC 5.16 04/27/2021    HGB 13.7 04/27/2021    HCT 44.3 04/27/2021     04/27/2021    MCV 85.9 04/27/2021    MCH 26.6 04/27/2021    MCHC 30.9 04/27/2021    RDW 13.1 04/27/2021    LYMPHOPCT 41.2 04/27/2021    MONOPCT 8.7 04/27/2021    BASOPCT 0.8 04/27/2021    MONOSABS 0.77 04/27/2021    LYMPHSABS 3.66 04/27/2021    EOSABS 0.37 04/27/2021    BASOSABS 0.07 04/27/2021     CMP:    Lab Results   Component Value Date     04/27/2021    K 4.3 04/27/2021    K 4.7 04/26/2021    CL 99 04/27/2021    CO2 27 04/27/2021    BUN 15 04/27/2021    CREATININE 0.7 04/27/2021    GFRAA >60 04/27/2021    LABGLOM >60 04/27/2021    GLUCOSE 150 04/27/2021    PROT 8.0 04/25/2021    LABALBU 4.0 04/25/2021    CALCIUM 9.4 04/27/2021    BILITOT 0.3 04/25/2021    ALKPHOS 98 04/25/2021    AST 11 04/25/2021    ALT 13 04/25/2021        Assessment/Plan:  Principal Problem: Thoracic back pain  Active Problems:    Hyponatremia    Diabetes mellitus (HCC)    Tobacco abuse    Adrenal nodule (HCC) - incidental  Resolved Problems:    * No resolved hospital problems. *       Stay off abx for now - no big abscess or signs of raging infection, although infection remains on differential    Open MRI with pre-med today    Neurosurgery has consulted ID    Patient didn't like current neurosurgeon - second opinion requested    Nicotine patch    Glucose OK generally. Continue SSI. A1C not well controlled at 8.6%.     Requires continued inpatient level of care   Mich Castle    10:01 AM  4/28/2021

## 2021-04-28 NOTE — CONSULTS
[address]                  [address] [facility]  [facility]                                  CONSULTATION    PATIENT NAME: Janusz Jamison                    :        1965  MED REC NO:   [id number]                            ROOM:       5415  ACCOUNT NO:   [de-identified]                           ADMIT DATE: 2021  PROVIDER:     BLESSING Vaca    CONSULT DATE:  2021    NEUROSURGICAL CONSULTATION    REASON FOR CONSULTATION:  Thoracic back and bilateral leg pain. HISTORY OF PRESENT ILLNESS:  This is a 26-year-old gentleman who began  having severe low back and bilateral leg pain over the past month. The  pain is worse with activity, improves with lying down. The pain  radiates down both of his legs down to the level of the knee. He has  some numbness associated with this as well. The pain is 10/10 on a pain  scale and achy in character. No traumatic events that he can recall. No new bowel or bladder changes. CAT scan was done of the thoracic  spine, which shows evidence of a T12 compression fracture and some  degenerative changes there at the T11-T12 disk space, an infectious  etiology there at the disk space could not be excluded. PAST MEDICAL HISTORY:  Significant for hypertension, diabetes. PAST SURGICAL HISTORY:  Significant for a cervical fusion as well as a  wrist surgery, right carpal tunnel release. SOCIAL HISTORY:  The patient does smoke. He denies alcohol use. MEDICATIONS:  Please see electronic medical record. History reviewed. No pertinent family history.     Scheduled Meds:   cyclobenzaprine  10 mg Oral TID    sennosides-docusate sodium  1 tablet Oral BID    polyethylene glycol  17 g Oral BID    gabapentin  300 mg Oral TID    nicotine  1 patch Transdermal Daily    acetaminophen  1,000 mg Oral TID    lamoTRIgine  100 mg Oral BID    sodium chloride flush  10 mL Intravenous 2 times per day    insulin lispro  0-6 Units Subcutaneous TID     insulin lispro  0-3 Units Subcutaneous Nightly    lisinopril  20 mg Oral Daily     Continuous Infusions:   sodium chloride      dextrose       PRN Meds:.oxyCODONE **OR** oxyCODONE, HYDROmorphone **OR** HYDROmorphone, sodium chloride flush, sodium chloride, potassium chloride **OR** potassium alternative oral replacement **OR** potassium chloride, magnesium hydroxide, glucose, dextrose, glucagon (rDNA), dextrose, trimethobenzamide    No Known Allergies      PHYSICAL EXAMINATION:  GENERAL:  A 55-year-old gentleman who is well-developed, well-nourished  male appearing his stated age in no acute distress. Alert and oriented  x3. NEUROLOGIC:  Cranial nerves were II through XII were intact. SKIN:  Warm and dry. LUNGS:  No respiratory distress. ABDOMEN:  No significant abdominal distension. EXTREMITIES:  He is able to move both his lower extremities well. He  does have about 4/5 strength in both his iliopsoas and quadriceps, but  pain was noted. He does have tenderness to palpation of lumbar spine. IMPRESSION:  A 55-year-old gentleman with severe low back and bilateral  thigh pain. He does have an elevated C-reactive protein and sed rate. White blood cell count is normal.    PLAN:  I agree with the MRI of the thoracic and lumbar spine with  Ativan. Most likely this will have to be an open MRI due to his size. Further plan of action pending results of the studies. BLESSING DICKSON    I have interviewed and examined the patient and agree with above.  Patient with T12 compression fracture  Will await MRI results    Vivian Dan      D: 04/28/2021 11:01:20       T: 04/28/2021 13:21:45     [email or web address]  Job#: [id number]     Doc#: [id number]    CC:

## 2021-04-28 NOTE — PROGRESS NOTES
[place] [facility]- 4/28- Spoke to SOCRATES Joyce and she will setup patient with OLGA LIDIA BIRCH [facility] today.

## 2021-04-28 NOTE — CONSULTS
off of antibiotics and scheduled for an MRI    Past Medical History:        Diagnosis Date    Arthritis     right wrist    Diabetes mellitus ([address]     borderline, tries to watch diet    Difficult intubation     Hypertension     bp has been sporadic    Infected tooth 5/21/2014    saw dentist, placed on antibiotic, to call Dr. Correa Counter office to inform    Pain     chronic, goes to pain clinic, placed on Methodone    Preoperative clearance 5/28/2014    dental- Dr. Haynes Sandhoff; paper copy on chart     Past Surgical History:        Procedure Laterality Date   Rojas Bloodgood Right 2012    NECK SURGERY  2009,2011    cervical 2-6, fusion    WRIST ARTHROSCOPY  5/30/14    right with decompression     Current Medications:   Scheduled Meds:   LORazepam  0.25 mg Intravenous On Call    gabapentin  300 mg Oral TID    nicotine  1 patch Transdermal Daily    acetaminophen  1,000 mg Oral TID    lamoTRIgine  100 mg Oral BID    sodium chloride flush  10 mL Intravenous 2 times per day    enoxaparin  40 mg Subcutaneous Daily    insulin lispro  0-6 Units Subcutaneous TID WC    insulin lispro  0-3 Units Subcutaneous Nightly    lisinopril  20 mg Oral Daily    [Held by provider] hydroCHLOROthiazide  25 mg Oral Daily     Continuous Infusions:   sodium chloride      dextrose       PRN Meds:HYDROmorphone **OR** HYDROmorphone, oxyCODONE, sodium chloride flush, sodium chloride, potassium chloride **OR** potassium alternative oral replacement **OR** potassium chloride, magnesium hydroxide, glucose, dextrose, glucagon (rDNA), dextrose, trimethobenzamide    Allergies:  Patient has no known allergies.     Social History:   Social History     Socioeconomic History    Marital status:      Spouse name: None    Number of children: None    Years of education: None    Highest education level: None   Occupational History    None   Social Needs    Financial resource strain: None    Food insecurity     Worry: None Inability: None    Transportation needs     Medical: None     Non-medical: None   Tobacco Use    Smoking status: Current Every Day Smoker     Packs/day: 0.25     Years: 22.00     Pack years: 5.50    Smokeless tobacco: Never Used   Substance and Sexual Activity    Alcohol use: No    Drug use: No    Sexual activity: None   Lifestyle    Physical activity     Days per week: None     Minutes per session: None    Stress: None   Relationships    Social connections     Talks on phone: None     Gets together: None     Attends [employer] service: None     Active member of club or organization: None     Attends meetings of clubs or organizations: None     Relationship status: None    Intimate partner violence     Fear of current or ex partner: None     Emotionally abused: None     Physically abused: None     Forced sexual activity: None   Other Topics Concern    None   Social History Narrative    None       Family History:   History reviewed. No pertinent family history. . Otherwise non-pertinent to the chief complaint. REVIEW OF SYSTEMS:    CONSTITUTIONAL:  No chills, fevers or night sweats. No loss of weight. EYES:  No double vision or drainage from eyes, ears or throat. HEENT:  No neck stiffness. No dysphagia. No drainage from eyes, ears or throat  RESPIRATORY:  No cough, productive sputum or hemoptysis. CARDIOVASCULAR:  No chest pain, palpitations, orthopnea or dyspnea on exertion. GASTROINTESTINAL:  No nausea, vomiting, diarrhea or constipation or hematochezia   GENITOURINARY:  No frequency burning dysuria or hematuria. INTEGUMENT/BREAST:  No rash or breast masses. HEMATOLOGIC/LYMPHATIC:  No lymphadenopathy or blood dyscrasics. ALLERGIC/IMMUNOLOGIC:  No anaphylaxis. ENDOCRINE:  No polyuria or polydipsia or temperature intolerance. MUSCULOSKELETAL: See history of present illness  NEUROLOGICAL:  No focal motor sensory deficit. BEHAVIOR/PSYCH:  No psychosis.      PHYSICAL EXAM:    Vitals:    BP (!) 147/73   Pulse 73   Temp 98.2 °F (36.8 °C) (Temporal)   Resp 22   Ht 6' (1.829 m)   Wt 278 lb (126.1 kg)   SpO2 90%   BMI 37.70 kg/m²   Constitutional: The patient is awake, alert, and oriented. Skin: Warm and dry. No rashes were noted. No jaundice. HEENT: Eyes show round, and reactive pupils. Moist mucous membranes, no ulcerations, no thrush. Neck: Supple to movements. No lymphadenopathy. Chest: No use of accessory muscles to breathe. Symmetrical expansion. Auscultation reveals no wheezing, crackles, or rhonchi. Cardiovascular: S1 and S2 are rhythmic and regular. No murmurs appreciated. Abdomen: Positive bowel sounds to auscultation. Benign to palpation. No masses felt. No hepatosplenomegaly. Genitourinary: Male  Extremities: No clubbing, no cyanosis, no edema.   Musculoskeletal: Pain over the lower thoracic spine, no flank pain  Neurological: No focal motor or sensory loss however there is some weakness in lower extremities bilaterally  Lines: peripheral      CBC+dif:  Recent Labs     04/25/21  1526 04/26/21  0553 04/27/21  0548   WBC 12.5* 10.0 8.9   HGB 15.7 14.3 13.7   HCT 50.5 45.4 44.3   MCV 85.0 87.0 85.9    319 296   NEUTROABS 7.72* 5.12 4.00     Lab Results   Component Value Date    CRP 3.0 (H) 04/26/2021     No results found for: [facility]  Lab Results   Component Value Date    SEDRATE 68 (H) 04/26/2021     Lab Results   Component Value Date    ALT 13 04/25/2021    AST 11 04/25/2021    ALKPHOS 98 04/25/2021    BILITOT 0.3 04/25/2021     Lab Results   Component Value Date     04/27/2021    K 4.3 04/27/2021    K 4.7 04/26/2021    CL 99 04/27/2021    CO2 27 04/27/2021    BUN 15 04/27/2021    CREATININE 0.7 04/27/2021    GFRAA >60 04/27/2021    LABGLOM >60 04/27/2021    GLUCOSE 150 04/27/2021    PROT 8.0 04/25/2021    LABALBU 4.0 04/25/2021    CALCIUM 9.4 04/27/2021    BILITOT 0.3 04/25/2021    ALKPHOS 98 04/25/2021    AST 11 04/25/2021    ALT 13 04/25/2021       No results found for: PROTIME, INR    No results found for: TSH    Lab Results   Component Value Date    COLORU Yellow 04/25/2021    PHUR 6.0 04/25/2021    WBCUA 0-1 04/25/2021    RBCUA NONE 04/25/2021    BACTERIA NONE SEEN 04/25/2021    CLARITYU Clear 04/25/2021    SPECGRAV >=1.030 04/25/2021    LEUKOCYTESUR Negative 04/25/2021    UROBILINOGEN 0.2 04/25/2021    BILIRUBINUR Negative 04/25/2021    BLOODU Negative 04/25/2021    GLUCOSEU Negative 04/25/2021       No results found for: [place], BEART, H5RSDAVS, PHART, THGBART, NRZ4OAN, PO2ART, KHD8WNE  Radiology:  CT THORACIC SPINE W CONTRAST   Final Result   1. Subacute T12 compression fracture demonstrating 30% loss of height. 2. Mild spinal canal stenosis at T11-T12 secondary to 4 mm of retropulsion of   the posterior margin of the T12 vertebral body into the spinal canal.   3. No findings to suggest discitis/osteomyelitis. 4. Indeterminate 2.2 cm right adrenal nodule for which an MRI with contrast   is recommended for further characterization. MRI LUMBAR SPINE W WO CONTRAST    (Results Pending)   MRI THORACIC SPINE W WO CONTRAST    (Results Pending)       Microbiology:  Pending  Recent Labs     04/25/21  1715   BC 24 Hours no growth     No results for input(s): ORG in the last 72 hours. Recent Labs     04/25/21  1716   BLOODCULT2 24 Hours no growth     No results for input(s): STREPNEUMAGU in the last 72 hours. No results for input(s): LP1UAG in the last 72 hours. No results for input(s): ASO in the last 72 hours. No results for input(s): CULTRESP in the last 72 hours. Assessment:  · T11-T12 compression fracture with spinal stenosis; I doubt that he has discitis however at this point in time any biopsy or CT-guided aspiration will be unproductive in terms of infection because he has been on Bactrim followed by Rocephin.     Plan:    · Cont MRI and with contrast and if positive suggest transfer to [facility] for eventual aspiration and biopsy if necessary to rule out discitis. There is no purpose in doing this now because of the previous antibiotics affecting the cultures. · Agree with holding antibiotics at this point  · Check cultures  · Baseline ESR, CRP  · Monitor labs  · Will follow with you    Thank you for having us see this patient in consultation. I will be discussing this case with the treating physicians.       Electronically signed by Zoya Marcelino MD on 4/28/2021 at 10:53 AM

## 2021-04-28 NOTE — CARE COORDINATION
4/28/2021 social work transition of care planning  Pt plan is home, once medically stable.    Electronically signed by AIYANA Nation on 4/28/2021 at 12:50 PM

## 2021-04-29 ENCOUNTER — ANESTHESIA EVENT (OUTPATIENT)
Dept: ORTHOPEDICS UNIT | Age: 56
DRG: 321 | End: 2021-04-29
Payer: COMMERCIAL

## 2021-04-29 ENCOUNTER — ANESTHESIA (OUTPATIENT)
Dept: ORTHOPEDICS UNIT | Age: 56
DRG: 321 | End: 2021-04-29
Payer: COMMERCIAL

## 2021-04-29 PROBLEM — G95.20 SPINAL CORD COMPRESSION (HCC): Status: ACTIVE | Noted: 2021-04-29

## 2021-04-29 PROBLEM — S22.081A: Status: ACTIVE | Noted: 2021-04-29

## 2021-04-29 LAB
ANION GAP SERPL CALCULATED.3IONS-SCNC: 9 MMOL/L (ref 7–16)
BUN BLDV-MCNC: 14 MG/DL (ref 6–20)
CALCIUM SERPL-MCNC: 9.2 MG/DL (ref 8.6–10.2)
CHLORIDE BLD-SCNC: 99 MMOL/L (ref 98–107)
CO2: 27 MMOL/L (ref 22–29)
CREAT SERPL-MCNC: 0.6 MG/DL (ref 0.7–1.2)
GFR AFRICAN AMERICAN: >60
GFR NON-AFRICAN AMERICAN: >60 ML/MIN/1.73
GLUCOSE BLD-MCNC: 161 MG/DL (ref 74–99)
METER GLUCOSE: 216 MG/DL (ref 74–99)
METER GLUCOSE: 251 MG/DL (ref 74–99)
POTASSIUM REFLEX MAGNESIUM: 4.4 MMOL/L (ref 3.5–5)
SODIUM BLD-SCNC: 135 MMOL/L (ref 132–146)

## 2021-04-29 PROCEDURE — 6370000000 HC RX 637 (ALT 250 FOR IP): Performed by: PHYSICIAN ASSISTANT

## 2021-04-29 PROCEDURE — 36415 COLL VENOUS BLD VENIPUNCTURE: CPT

## 2021-04-29 PROCEDURE — 82962 GLUCOSE BLOOD TEST: CPT

## 2021-04-29 PROCEDURE — 80048 BASIC METABOLIC PNL TOTAL CA: CPT

## 2021-04-29 PROCEDURE — 84153 ASSAY OF PSA TOTAL: CPT

## 2021-04-29 PROCEDURE — 2700000000 HC OXYGEN THERAPY PER DAY

## 2021-04-29 PROCEDURE — 99232 SBSQ HOSP IP/OBS MODERATE 35: CPT | Performed by: NEUROLOGICAL SURGERY

## 2021-04-29 PROCEDURE — 1200000000 HC SEMI PRIVATE

## 2021-04-29 PROCEDURE — 6370000000 HC RX 637 (ALT 250 FOR IP): Performed by: INTERNAL MEDICINE

## 2021-04-29 PROCEDURE — 2580000003 HC RX 258: Performed by: PHYSICIAN ASSISTANT

## 2021-04-29 PROCEDURE — 6370000000 HC RX 637 (ALT 250 FOR IP): Performed by: NURSE PRACTITIONER

## 2021-04-29 PROCEDURE — 6360000002 HC RX W HCPCS: Performed by: PHYSICIAN ASSISTANT

## 2021-04-29 PROCEDURE — 6360000002 HC RX W HCPCS: Performed by: INTERNAL MEDICINE

## 2021-04-29 RX ORDER — SODIUM CHLORIDE 0.9 % (FLUSH) 0.9 %
5-40 SYRINGE (ML) INJECTION EVERY 12 HOURS SCHEDULED
Status: CANCELLED | OUTPATIENT
Start: 2021-04-29

## 2021-04-29 RX ORDER — POLYETHYLENE GLYCOL 3350 17 G/17G
17 POWDER, FOR SOLUTION ORAL 2 TIMES DAILY
Status: DISCONTINUED | OUTPATIENT
Start: 2021-04-29 | End: 2021-05-14 | Stop reason: HOSPADM

## 2021-04-29 RX ORDER — CYCLOBENZAPRINE HCL 10 MG
10 TABLET ORAL NIGHTLY PRN
Status: DISCONTINUED | OUTPATIENT
Start: 2021-04-29 | End: 2021-04-29

## 2021-04-29 RX ORDER — OXYCODONE HYDROCHLORIDE 5 MG/1
5 TABLET ORAL EVERY 4 HOURS PRN
Status: DISCONTINUED | OUTPATIENT
Start: 2021-04-29 | End: 2021-05-05

## 2021-04-29 RX ORDER — SODIUM CHLORIDE 0.9 % (FLUSH) 0.9 %
5-40 SYRINGE (ML) INJECTION PRN
Status: CANCELLED | OUTPATIENT
Start: 2021-04-29

## 2021-04-29 RX ORDER — OXYCODONE HYDROCHLORIDE 10 MG/1
10 TABLET ORAL EVERY 4 HOURS PRN
Status: DISCONTINUED | OUTPATIENT
Start: 2021-04-29 | End: 2021-05-05

## 2021-04-29 RX ORDER — SENNA AND DOCUSATE SODIUM 50; 8.6 MG/1; MG/1
1 TABLET, FILM COATED ORAL 2 TIMES DAILY
Status: DISCONTINUED | OUTPATIENT
Start: 2021-04-29 | End: 2021-05-14 | Stop reason: HOSPADM

## 2021-04-29 RX ORDER — SODIUM CHLORIDE 9 MG/ML
25 INJECTION, SOLUTION INTRAVENOUS PRN
Status: CANCELLED | OUTPATIENT
Start: 2021-04-29

## 2021-04-29 RX ORDER — CYCLOBENZAPRINE HCL 10 MG
10 TABLET ORAL 3 TIMES DAILY
Status: DISCONTINUED | OUTPATIENT
Start: 2021-04-29 | End: 2021-05-01

## 2021-04-29 RX ADMIN — HYDROMORPHONE HYDROCHLORIDE 1 MG: 1 INJECTION, SOLUTION INTRAMUSCULAR; INTRAVENOUS; SUBCUTANEOUS at 18:17

## 2021-04-29 RX ADMIN — OXYCODONE 5 MG: 5 TABLET ORAL at 00:04

## 2021-04-29 RX ADMIN — GABAPENTIN 300 MG: 300 CAPSULE ORAL at 20:47

## 2021-04-29 RX ADMIN — CYCLOBENZAPRINE 10 MG: 10 TABLET, FILM COATED ORAL at 20:47

## 2021-04-29 RX ADMIN — INSULIN LISPRO 1 UNITS: 100 INJECTION, SOLUTION INTRAVENOUS; SUBCUTANEOUS at 20:54

## 2021-04-29 RX ADMIN — CYCLOBENZAPRINE 10 MG: 10 TABLET, FILM COATED ORAL at 16:44

## 2021-04-29 RX ADMIN — CYCLOBENZAPRINE 10 MG: 10 TABLET, FILM COATED ORAL at 05:15

## 2021-04-29 RX ADMIN — Medication 10 ML: at 13:56

## 2021-04-29 RX ADMIN — SENNOSIDES AND DOCUSATE SODIUM 1 TABLET: 8.6; 5 TABLET ORAL at 13:56

## 2021-04-29 RX ADMIN — OXYCODONE 5 MG: 5 TABLET ORAL at 05:30

## 2021-04-29 RX ADMIN — GABAPENTIN 300 MG: 300 CAPSULE ORAL at 08:03

## 2021-04-29 RX ADMIN — HYDROMORPHONE HYDROCHLORIDE 1 MG: 1 INJECTION, SOLUTION INTRAMUSCULAR; INTRAVENOUS; SUBCUTANEOUS at 00:54

## 2021-04-29 RX ADMIN — LAMOTRIGINE 100 MG: 100 TABLET ORAL at 20:47

## 2021-04-29 RX ADMIN — OXYCODONE 5 MG: 5 TABLET ORAL at 11:00

## 2021-04-29 RX ADMIN — HYDROMORPHONE HYDROCHLORIDE 1 MG: 1 INJECTION, SOLUTION INTRAMUSCULAR; INTRAVENOUS; SUBCUTANEOUS at 21:49

## 2021-04-29 RX ADMIN — HYDROMORPHONE HYDROCHLORIDE 1 MG: 1 INJECTION, SOLUTION INTRAMUSCULAR; INTRAVENOUS; SUBCUTANEOUS at 04:04

## 2021-04-29 RX ADMIN — Medication 10 ML: at 08:07

## 2021-04-29 RX ADMIN — POLYETHYLENE GLYCOL 3350 17 G: 17 POWDER, FOR SOLUTION ORAL at 16:44

## 2021-04-29 RX ADMIN — LISINOPRIL 20 MG: 20 TABLET ORAL at 08:04

## 2021-04-29 RX ADMIN — GABAPENTIN 300 MG: 300 CAPSULE ORAL at 13:56

## 2021-04-29 RX ADMIN — ACETAMINOPHEN 1000 MG: 500 TABLET, FILM COATED ORAL at 13:56

## 2021-04-29 RX ADMIN — CYCLOBENZAPRINE 10 MG: 10 TABLET, FILM COATED ORAL at 11:00

## 2021-04-29 RX ADMIN — ENOXAPARIN SODIUM 40 MG: 40 INJECTION SUBCUTANEOUS at 08:03

## 2021-04-29 RX ADMIN — SENNOSIDES AND DOCUSATE SODIUM 1 TABLET: 8.6; 5 TABLET ORAL at 20:47

## 2021-04-29 RX ADMIN — LAMOTRIGINE 100 MG: 100 TABLET ORAL at 08:03

## 2021-04-29 RX ADMIN — POLYETHYLENE GLYCOL 3350 17 G: 17 POWDER, FOR SOLUTION ORAL at 20:47

## 2021-04-29 RX ADMIN — OXYCODONE HYDROCHLORIDE 10 MG: 10 TABLET ORAL at 16:44

## 2021-04-29 RX ADMIN — ACETAMINOPHEN 1000 MG: 500 TABLET, FILM COATED ORAL at 20:47

## 2021-04-29 RX ADMIN — HYDROMORPHONE HYDROCHLORIDE 1 MG: 1 INJECTION, SOLUTION INTRAMUSCULAR; INTRAVENOUS; SUBCUTANEOUS at 13:55

## 2021-04-29 RX ADMIN — Medication 10 ML: at 20:47

## 2021-04-29 RX ADMIN — ACETAMINOPHEN 1000 MG: 500 TABLET, FILM COATED ORAL at 08:04

## 2021-04-29 ASSESSMENT — PAIN DESCRIPTION - PROGRESSION
CLINICAL_PROGRESSION: NOT CHANGED
CLINICAL_PROGRESSION: NOT CHANGED
CLINICAL_PROGRESSION: GRADUALLY IMPROVING
CLINICAL_PROGRESSION: GRADUALLY IMPROVING

## 2021-04-29 ASSESSMENT — PAIN DESCRIPTION - ORIENTATION
ORIENTATION: MID;LOWER
ORIENTATION: LOWER;MID
ORIENTATION: MID;LOWER
ORIENTATION: LOWER;MID
ORIENTATION: LOWER;MID
ORIENTATION: RIGHT;LEFT;LOWER

## 2021-04-29 ASSESSMENT — PAIN DESCRIPTION - FREQUENCY
FREQUENCY: INTERMITTENT

## 2021-04-29 ASSESSMENT — PAIN DESCRIPTION - PAIN TYPE
TYPE: ACUTE PAIN

## 2021-04-29 ASSESSMENT — PAIN SCALES - GENERAL
PAINLEVEL_OUTOF10: 4
PAINLEVEL_OUTOF10: 7
PAINLEVEL_OUTOF10: 3
PAINLEVEL_OUTOF10: 4
PAINLEVEL_OUTOF10: 7
PAINLEVEL_OUTOF10: 7
PAINLEVEL_OUTOF10: 3
PAINLEVEL_OUTOF10: 8
PAINLEVEL_OUTOF10: 8
PAINLEVEL_OUTOF10: 7
PAINLEVEL_OUTOF10: 8
PAINLEVEL_OUTOF10: 3
PAINLEVEL_OUTOF10: 4
PAINLEVEL_OUTOF10: 8
PAINLEVEL_OUTOF10: 7

## 2021-04-29 ASSESSMENT — PAIN DESCRIPTION - DESCRIPTORS
DESCRIPTORS: ACHING;CONSTANT;DISCOMFORT
DESCRIPTORS: ACHING;DISCOMFORT;CONSTANT
DESCRIPTORS: ACHING;DISCOMFORT;CONSTANT
DESCRIPTORS: ACHING;CONSTANT;DISCOMFORT
DESCRIPTORS: ACHING;DISCOMFORT;CONSTANT

## 2021-04-29 ASSESSMENT — PAIN DESCRIPTION - LOCATION
LOCATION: BACK;LEG
LOCATION: BACK;LEG
LOCATION: BACK
LOCATION: BACK
LOCATION: BACK;LEG
LOCATION: BACK

## 2021-04-29 ASSESSMENT — PAIN DESCRIPTION - ONSET
ONSET: ON-GOING

## 2021-04-29 ASSESSMENT — PAIN - FUNCTIONAL ASSESSMENT
PAIN_FUNCTIONAL_ASSESSMENT: PREVENTS OR INTERFERES SOME ACTIVE ACTIVITIES AND ADLS
PAIN_FUNCTIONAL_ASSESSMENT: ACTIVITIES ARE NOT PREVENTED

## 2021-04-29 NOTE — PROGRESS NOTES
[facility] called regarding transfer to [facility].  Face sheet faxed to [phone]

## 2021-04-29 NOTE — PROGRESS NOTES
Chief Complaint:  Chief Complaint   Patient presents with    Back Pain     middle back; \"feels like a knot\"    Leg Pain     both thighs     Burst fracture of twelfth thoracic vertebra (HCC)     Subjective:    He continues to have pain, but no weakness or numbness. He had fired Dr. Wesly Licona yesterday. That was before we had the MRI results back. Today we have the MRI results which are very concerning. Dr. Sheryle Rocker went to see the patient and recommended urgent surgery. Reportedly the patient stated he does not want surgery at this time. The patient tells me that he was not ruling out surgery completely, but the he had to [facility] care of things\" such as car bills and other unrelated issues. I am told that the patient was counseled very clearly about the risk for paralysis and still declined urgent surgery. Now, the patient wants to have the surgery tomorrow, but Dr. Sheryle Rocker states he is not offering it on an elective basis. I.e. the patient refused urgent surgery, and now he will need to find a different surgeon. Spoke w/Dr. Marcie Salazar who recommends transfer. The other surgeons are also recommending transfer. Awaiting callback from [facility] to see if they might take his case. Objective:    BP (!) 149/77   Pulse 84   Temp 99 °F (37.2 °C) (Temporal)   Resp 18   Ht 6' (1.829 m)   Wt 278 lb (126.1 kg)   SpO2 94%   BMI 37.70 kg/m²     Current medications that patient is taking have been reviewed. General appearance: NAD, conversant  HEENT: AT/NC, MMM  Neck: FROM, supple  Lungs: Clear to auscultation, WOB normal  CV: RRR, no MRGs  Abdomen: Soft, non-tender; no masses or HSM, +BS  Extremities: No peripheral edema or digital cyanosis  Skin: no rash, lesions or ulcers  Psych: Anxious  Neuro: Alert and interactive, face symmetric, moving all extremities, speech fluent. Strength 5/5 in B/L LE's.     Labs:  CBC with Differential:    Lab Results   Component Value Date    WBC 9.6 04/28/2021    RBC 5.14 04/28/2021    HGB 13.6 04/28/2021    HCT 44.0 04/28/2021     04/28/2021    MCV 85.6 04/28/2021    MCH 26.5 04/28/2021    MCHC 30.9 04/28/2021    RDW 13.0 04/28/2021    LYMPHOPCT 20.9 04/28/2021    MONOPCT 6.3 04/28/2021    BASOPCT 0.8 04/28/2021    MONOSABS 0.61 04/28/2021    LYMPHSABS 2.01 04/28/2021    EOSABS 0.35 04/28/2021    BASOSABS 0.08 04/28/2021     CMP:    Lab Results   Component Value Date     04/29/2021    K 4.4 04/29/2021    CL 99 04/29/2021    CO2 27 04/29/2021    BUN 14 04/29/2021    CREATININE 0.6 04/29/2021    GFRAA >60 04/29/2021    LABGLOM >60 04/29/2021    GLUCOSE 161 04/29/2021    PROT 6.8 04/28/2021    LABALBU 3.5 04/28/2021    CALCIUM 9.2 04/29/2021    BILITOT <0.2 04/28/2021    ALKPHOS 76 04/28/2021    AST 10 04/28/2021    ALT 13 04/28/2021        Assessment/Plan:  Principal Problem:    Burst fracture of twelfth thoracic vertebra (HCC)  Active Problems:    Hyponatremia    Diabetes mellitus (HCC)    Tobacco abuse    Adrenal nodule ([facility] [address] - incidental    Spinal cord compression (HCC)  Resolved Problems:    * No resolved hospital problems. *         MRI images personally reviewed, concerning for cord compression at T12    Need to find a surgeon willing to operate on him. Awaiting callback from [facility]    Strict bedrest until we sort this out    Doubt infection    Glucose OK generally. Slightly high this morning. Continue sliding scale. Requires continued inpatient level of care   Time spent: 60 minutes, at least 50% was on face-to-face counseling and coordination of care.    Miriam Samayoa    1:50 PM  4/29/2021

## 2021-04-29 NOTE — PROGRESS NOTES
Date: 2021       Patient Name: Benjie Evangelista  : 1965      MRN: [id number]    PT held pending plans for surgical intervention. Will follow up post operatively.      Glendy Dodson PT, DPT  [id number]

## 2021-04-29 NOTE — PROGRESS NOTES
Department of Neurosurgery  Progress Note    CHIEF COMPLAINT: seen for thoracic back pain and bilateral leg pain    SUBJECTIVE:  Continue back pain and leg pain/weakness. REVIEW OF SYSTEMS :  Constitutional: Negative for chills and fever. Neurological: Negative for dizziness, tremors and speech change. GI: negative for Nausea  ; negative for hematuria    OBJECTIVE:   VITALS:  BP (!) 149/77   Pulse 84   Temp 99 °F (37.2 °C) (Temporal)   Resp 18   Ht 6' (1.829 m)   Wt 278 lb (126.1 kg)   SpO2 94%   BMI 37.70 kg/m²     PHYSICAL:  Neurologic:  Mental Status Exam:  Level of Alertness:   awake  Orientation:   person, place, time  Motor Exam:  Moves all extremities, at least 4/5.  Pain noted with exam  Sensory:  Sensory intact  Tenderness to palpation of lumbar spine        DATA:  CBC:   Lab Results   Component Value Date    WBC 9.6 04/28/2021    RBC 5.14 04/28/2021    HGB 13.6 04/28/2021    HCT 44.0 04/28/2021    MCV 85.6 04/28/2021    MCH 26.5 04/28/2021    MCHC 30.9 04/28/2021    RDW 13.0 04/28/2021     04/28/2021    MPV 9.8 04/28/2021     BMP:    Lab Results   Component Value Date     04/29/2021    K 4.4 04/29/2021    CL 99 04/29/2021    CO2 27 04/29/2021    BUN 14 04/29/2021    LABALBU 3.5 04/28/2021    CREATININE 0.6 04/29/2021    CALCIUM 9.2 04/29/2021    GFRAA >60 04/29/2021    LABGLOM >60 04/29/2021    GLUCOSE 161 04/29/2021     PT/INR:  No results found for: PROTIME, INR  PTT:  No results found for: APTT, PTT[APTT}    Current Inpatient Medications  Current Facility-Administered Medications: cyclobenzaprine (FLEXERIL) tablet 10 mg, 10 mg, Oral, TID  gabapentin (NEURONTIN) capsule 300 mg, 300 mg, Oral, TID  nicotine (NICODERM CQ) 21 MG/24HR 1 patch, 1 patch, Transdermal, Daily  HYDROmorphone (DILAUDID) injection 1 mg, 1 mg, Intravenous, Q3H PRN **OR** HYDROmorphone (DILAUDID) injection 0.5 mg, 0.5 mg, Intravenous, Q3H PRN  acetaminophen (TYLENOL) tablet 1,000 mg, 1,000 mg, Oral, TID  oxyCODONE (ROXICODONE) immediate release tablet 5 mg, 5 mg, Oral, Q4H PRN  lamoTRIgine (LAMICTAL) tablet 100 mg, 100 mg, Oral, BID  sodium chloride flush 0.9 % injection 10 mL, 10 mL, Intravenous, 2 times per day  sodium chloride flush 0.9 % injection 10 mL, 10 mL, Intravenous, PRN  0.9 % sodium chloride infusion, 25 mL, Intravenous, PRN  potassium chloride (KLOR-CON M) extended release tablet 40 mEq, 40 mEq, Oral, PRN **OR** potassium bicarb-citric acid (EFFER-K) effervescent tablet 40 mEq, 40 mEq, Oral, PRN **OR** potassium chloride 10 mEq/100 mL IVPB (Peripheral Line), 10 mEq, Intravenous, PRN  magnesium hydroxide (MILK OF MAGNESIA) 400 MG/5ML suspension 30 mL, 30 mL, Oral, Daily PRN  glucose (GLUTOSE) 40 % oral gel 15 g, 15 g, Oral, PRN  dextrose 50 % IV solution, 12.5 g, Intravenous, PRN  glucagon (rDNA) injection 1 mg, 1 mg, Intramuscular, PRN  dextrose 5 % solution, 100 mL/hr, Intravenous, PRN  insulin lispro (HUMALOG) injection vial 0-6 Units, 0-6 Units, Subcutaneous, TID WC  insulin lispro (HUMALOG) injection vial 0-3 Units, 0-3 Units, Subcutaneous, Nightly  trimethobenzamide (TIGAN) injection 200 mg, 200 mg, Intramuscular, Q6H PRN  lisinopril (PRINIVIL;ZESTRIL) tablet 20 mg, 20 mg, Oral, Daily  [Held by provider] hydroCHLOROthiazide (HYDRODIURIL) tablet 25 mg, 25 mg, Oral, Daily    ASSESSMENT:   Acute T12 fracture with spinal stenosis due to hematoma v abscess    PLAN:  -Discussed in detail with patient need for T12 laminectomy and decompression. R/B/A discussed. Pt wishes to proceed with surgery. To OR today.   -Keep NPO      Electronically signed by Marni De La Cruz PA-C on 4/29/2021 at 12:10 PM     I have interviewed and examined the patient and agree with above. He has severe stenosis at T12 with possible epidural hematoma versus abscess. I have recommended urgent laminectomy with evacuation of hematoma versus abscess. He does not wish to proceed with surgery until tomorrow.   I have 5 surgeries tomorrow and would not be able to get to him until at least 7 pm and given the severity of his symptoms and imaging findings I am recommending more urgent surgery. He does not want to have surgery today. I am recommending transfer to another facility as he does not like out recommendations.     Terry Hicks

## 2021-04-29 NOTE — PROGRESS NOTES
Pt states that \"I will do the surgery but I just want to talk to Dr Adriel Hernandez about it too and have a little time to let it settle in.  I just got really scared about the whole surgery part\"

## 2021-04-29 NOTE — PROGRESS NOTES
Accepted to [facility] by Dr. cJ Joyner, if I am unable to find a surgeon here to can operate on this patient.

## 2021-04-29 NOTE — PLAN OF CARE
Problem: Pain:  Goal: Pain level will decrease  Description: Pain level will decrease  4/29/2021 0207 by Patricia Espinal RN  Outcome: Ongoing  4/28/2021 1849 by Hermelindo Rothman  Outcome: Met This Shift  4/28/2021 1643 by Hermelindo Rothman  Outcome: Met This Shift  Goal: Control of acute pain  Description: Control of acute pain  4/29/2021 0207 by Patricia Espinal RN  Outcome: Met This Shift  4/28/2021 1849 by Hermelindo Rothman  Outcome: Met This Shift  4/28/2021 1643 by Hermelindo Rothman  Outcome: Met This Shift  Goal: Control of chronic pain  Description: Control of chronic pain  4/28/2021 1849 by Hermelindo Rothman  Outcome: Met This Shift  4/28/2021 1643 by Hermelindo Rothman  Outcome: Met This Shift     Problem: Falls - Risk of:  Goal: Will remain free from falls  Description: Will remain free from falls  4/29/2021 0207 by Patricia Espinal RN  Outcome: Ongoing  4/28/2021 1849 by Hermelindo Rothman  Outcome: Met This Shift  4/28/2021 1643 by Hermelindo Rothman  Outcome: Met This Shift  Goal: Absence of physical injury  Description: Absence of physical injury  4/28/2021 1849 by Hermelindo Rothman  Outcome: Met This Shift  4/28/2021 1643 by Hermelindo Rothman  Outcome: Met This Shift

## 2021-04-29 NOTE — PROGRESS NOTES
[address] [place] [address] [facility]  Progress Note      Chief Complaint   Patient presents with    Back Pain     middle back; \"feels like a knot\"    Leg Pain     both thighs       SUBJECTIVE:  Patient is tolerating medications. No reported adverse drug reactions. No nausea, vomiting, diarrhea. Reports leg cramps. Review of systems:  As stated above in the chief complaint, otherwise negative. Medications:  Scheduled Meds:   cyclobenzaprine  10 mg Oral TID    gabapentin  300 mg Oral TID    nicotine  1 patch Transdermal Daily    acetaminophen  1,000 mg Oral TID    lamoTRIgine  100 mg Oral BID    sodium chloride flush  10 mL Intravenous 2 times per day    insulin lispro  0-6 Units Subcutaneous TID WC    insulin lispro  0-3 Units Subcutaneous Nightly    lisinopril  20 mg Oral Daily    [Held by provider] hydroCHLOROthiazide  25 mg Oral Daily     Continuous Infusions:   sodium chloride      dextrose       PRN Meds:HYDROmorphone **OR** HYDROmorphone, oxyCODONE, sodium chloride flush, sodium chloride, potassium chloride **OR** potassium alternative oral replacement **OR** potassium chloride, magnesium hydroxide, glucose, dextrose, glucagon (rDNA), dextrose, trimethobenzamide    OBJECTIVE:  BP (!) 149/77   Pulse 84   Temp 99 °F (37.2 °C) (Temporal)   Resp 18   Ht 6' (1.829 m)   Wt 278 lb (126.1 kg)   SpO2 94%   BMI 37.70 kg/m²   Temp  Av.9 °F (37.2 °C)  Min: 98.3 °F (36.8 °C)  Max: 99.3 °F (37.4 °C)  Constitutional: The patient is awake, alert, and oriented. Skin: Warm and dry. No rashes were noted. HEENT: Round and reactive pupils. Moist mucous membranes. No ulcerations or thrush. Neck: Supple to movements. Chest: No use of accessory muscles to breathe. Symmetrical expansion. No wheezing, crackles or rhonchi. Cardiovascular: S1 and S2 are rhythmic and regular. No murmurs appreciated. Abdomen: Positive bowel sounds to auscultation. Benign to palpation.  No masses felt. No hepatosplenomegaly. Genitourinary: male  Extremities: No clubbing, no cyanosis, no edema. Neurological: No focal motor or sensory loss however there is some weakness in lower extremities bilaterally  Lines: peripheral    Laboratory and Tests Review:  Lab Results   Component Value Date    WBC 9.6 04/28/2021    WBC 8.9 04/27/2021    WBC 10.0 04/26/2021    HGB 13.6 04/28/2021    HCT 44.0 04/28/2021    MCV 85.6 04/28/2021     04/28/2021     Lab Results   Component Value Date    NEUTROABS 6.53 04/28/2021    NEUTROABS 4.00 04/27/2021    NEUTROABS 5.12 04/26/2021     No results found for: [facility]  Lab Results   Component Value Date    ALT 13 04/28/2021    AST 10 04/28/2021    ALKPHOS 76 04/28/2021    BILITOT <0.2 04/28/2021     Lab Results   Component Value Date     04/29/2021    K 4.4 04/29/2021    CL 99 04/29/2021    CO2 27 04/29/2021    BUN 14 04/29/2021    CREATININE 0.6 04/29/2021    CREATININE 0.7 04/28/2021    CREATININE 0.7 04/27/2021    GFRAA >60 04/29/2021    LABGLOM >60 04/29/2021    GLUCOSE 161 04/29/2021    PROT 6.8 04/28/2021    LABALBU 3.5 04/28/2021    CALCIUM 9.2 04/29/2021    BILITOT <0.2 04/28/2021    ALKPHOS 76 04/28/2021    AST 10 04/28/2021    ALT 13 04/28/2021     Lab Results   Component Value Date    CRP 1.0 (H) 04/28/2021    CRP 3.0 (H) 04/26/2021     Lab Results   Component Value Date    SEDRATE 57 (H) 04/28/2021    SEDRATE 68 (H) 04/26/2021     Radiology:  MRI  Acute to subacute superior endplate compression fracture of T12 with 40%   height loss and 5 mm retropulsion into the spinal canal.  There is associated   extensive anterior epidural hematoma at this level which contributes to   severe canal stenosis with cord compression at the level of T12 and moderate   bilateral neural foraminal narrowing at T11-T12 and T12-L1. No evidence of spondylo discitis.      Microbiology:   Lab Results   Component Value Date    BC 24 Hours no growth 04/25/2021     Lab Results Component Value Date    BLOODCULT2 24 Hours no growth 04/25/2021     No results found for: WNDABS  No results found for: RESPSMEAR  No results found for: MPNEUMO, CLAMYDCU, LABLEGI, AFBCX, FUNGSM, LABFUNG  No results found for: CULTRESP  No results found for: CXCATHTIP  No results found for: BFCS  No results found for: CXSURG  Urine Culture, Routine   Date Value Ref Range Status   04/25/2021 Growth not present  Final     No results found for: [facility]     Assessment:  · T11-T12 compression fracture with spinal stenosis; I doubt that he has discitis however at this point in time any biopsy or CT-guided aspiration will be unproductive in terms of infection because he has been on Bactrim followed by Rocephin.     Plan:    · MRI did not show discitis. · Unable to transfer to [facility] clinic for eventual aspiration and biopsy if necessary to rule out discitis as he does not have health insurance. There is no purpose in doing this now because of the previous antibiotics affecting the cultures. · Agree with holding antibiotics at this point  · Check cultures  · Baseline ESR 57, CRP 1.0  · Monitor labs  · Will follow with you     Thank you for having us see this patient in consultation    Electronically signed by Donney Barthel, APRN - CNP on 4/29/2021 at 11:23 AM     Pt seen and examined. Above discussed agree with advanced practice nurse. Labs, cultures, and radiographs reviewed. Face to Face encounter occurred. Changes made as necessary. Zayra Taveras MD    Pt seen and examined. Above discussed agree with advanced practice nurse. Labs, cultures, and radiographs reviewed. Face to Face encounter occurred. Changes made as necessary.      Zayra Taveras MD

## 2021-04-29 NOTE — PROGRESS NOTES
SHAQUILLE alonzo served that Julissa Ibrahim told his nurse that he does not want surgery today but he would go tomorrow. Wait for instructions.

## 2021-04-29 NOTE — PROGRESS NOTES
Dr. Fina Chisholm called and message left in regards to patient requesting medication for cramps and restless legs in BLE. Awaiting reply.

## 2021-04-29 NOTE — PROGRESS NOTES
Dr Sarmad Wright called about pt wanting to see \"my doctor\" and about his concern about constipation

## 2021-04-29 NOTE — CARE COORDINATION
4/29/2021 social work transition of care planning  Pt plan is home, awaiting NS plan. Sw will follow.   Electronically signed by AIYANA Stephenson on 4/29/2021 at 11:27 AM

## 2021-04-29 NOTE — PROGRESS NOTES
Occupational Therapy  OT BEDSIDE TREATMENT NOTE      Date:2021  Patient Name: Ciara Knight  MRN: [id number]  : 1965  Room: [address] [place] [address]       Pt scheduled for surgery due to burst fx  or . Will need reeval following sx.       Lorraine Prado

## 2021-04-30 ENCOUNTER — APPOINTMENT (OUTPATIENT)
Dept: GENERAL RADIOLOGY | Age: 56
DRG: 321 | End: 2021-04-30
Payer: COMMERCIAL

## 2021-04-30 ENCOUNTER — ANESTHESIA (OUTPATIENT)
Dept: OPERATING ROOM | Age: 56
DRG: 321 | End: 2021-04-30
Payer: COMMERCIAL

## 2021-04-30 ENCOUNTER — ANESTHESIA EVENT (OUTPATIENT)
Dept: OPERATING ROOM | Age: 56
DRG: 321 | End: 2021-04-30
Payer: COMMERCIAL

## 2021-04-30 VITALS
DIASTOLIC BLOOD PRESSURE: 108 MMHG | RESPIRATION RATE: 16 BRPM | SYSTOLIC BLOOD PRESSURE: 208 MMHG | TEMPERATURE: 97.7 F | OXYGEN SATURATION: 99 %

## 2021-04-30 LAB
ABO/RH: NORMAL
ANTIBODY SCREEN: NORMAL
APTT: 33.8 SEC (ref 24.5–35.1)
BLOOD CULTURE, ROUTINE: NORMAL
CULTURE, BLOOD 2: NORMAL
INR BLD: 1.1
METER GLUCOSE: 155 MG/DL (ref 74–99)
METER GLUCOSE: 164 MG/DL (ref 74–99)
METER GLUCOSE: 192 MG/DL (ref 74–99)
PROTHROMBIN TIME: 12.2 SEC (ref 9.3–12.4)

## 2021-04-30 PROCEDURE — 86900 BLOOD TYPING SEROLOGIC ABO: CPT

## 2021-04-30 PROCEDURE — 2500000003 HC RX 250 WO HCPCS: Performed by: NEUROLOGICAL SURGERY

## 2021-04-30 PROCEDURE — 87070 CULTURE OTHR SPECIMN AEROBIC: CPT

## 2021-04-30 PROCEDURE — 3600000014 HC SURGERY LEVEL 4 ADDTL 15MIN: Performed by: NEUROLOGICAL SURGERY

## 2021-04-30 PROCEDURE — 88331 PATH CONSLTJ SURG 1 BLK 1SPC: CPT

## 2021-04-30 PROCEDURE — 00NX0ZZ RELEASE THORACIC SPINAL CORD, OPEN APPROACH: ICD-10-PCS | Performed by: NEUROLOGICAL SURGERY

## 2021-04-30 PROCEDURE — 6360000002 HC RX W HCPCS: Performed by: INTERNAL MEDICINE

## 2021-04-30 PROCEDURE — 1200000000 HC SEMI PRIVATE

## 2021-04-30 PROCEDURE — 6370000000 HC RX 637 (ALT 250 FOR IP): Performed by: PHYSICIAN ASSISTANT

## 2021-04-30 PROCEDURE — 3209999900 FLUORO FOR SURGICAL PROCEDURES

## 2021-04-30 PROCEDURE — 88311 DECALCIFY TISSUE: CPT

## 2021-04-30 PROCEDURE — 6360000002 HC RX W HCPCS: Performed by: NEUROLOGICAL SURGERY

## 2021-04-30 PROCEDURE — 88341 IMHCHEM/IMCYTCHM EA ADD ANTB: CPT

## 2021-04-30 PROCEDURE — 0PB40ZX EXCISION OF THORACIC VERTEBRA, OPEN APPROACH, DIAGNOSTIC: ICD-10-PCS | Performed by: NEUROLOGICAL SURGERY

## 2021-04-30 PROCEDURE — 86901 BLOOD TYPING SEROLOGIC RH(D): CPT

## 2021-04-30 PROCEDURE — 87116 MYCOBACTERIA CULTURE: CPT

## 2021-04-30 PROCEDURE — 3700000001 HC ADD 15 MINUTES (ANESTHESIA): Performed by: NEUROLOGICAL SURGERY

## 2021-04-30 PROCEDURE — 87205 SMEAR GRAM STAIN: CPT

## 2021-04-30 PROCEDURE — 22513 PERQ VERTEBRAL AUGMENTATION: CPT | Performed by: NEUROLOGICAL SURGERY

## 2021-04-30 PROCEDURE — 6370000000 HC RX 637 (ALT 250 FOR IP): Performed by: NEUROLOGICAL SURGERY

## 2021-04-30 PROCEDURE — 36415 COLL VENOUS BLD VENIPUNCTURE: CPT

## 2021-04-30 PROCEDURE — 01N80ZZ RELEASE THORACIC NERVE, OPEN APPROACH: ICD-10-PCS | Performed by: NEUROLOGICAL SURGERY

## 2021-04-30 PROCEDURE — 63046 LAM FACETEC & FORAMOT THRC: CPT | Performed by: NEUROLOGICAL SURGERY

## 2021-04-30 PROCEDURE — 6370000000 HC RX 637 (ALT 250 FOR IP): Performed by: INTERNAL MEDICINE

## 2021-04-30 PROCEDURE — 7100000001 HC PACU RECOVERY - ADDTL 15 MIN: Performed by: NEUROLOGICAL SURGERY

## 2021-04-30 PROCEDURE — 86850 RBC ANTIBODY SCREEN: CPT

## 2021-04-30 PROCEDURE — 3700000000 HC ANESTHESIA ATTENDED CARE: Performed by: NEUROLOGICAL SURGERY

## 2021-04-30 PROCEDURE — 82962 GLUCOSE BLOOD TEST: CPT

## 2021-04-30 PROCEDURE — 87015 SPECIMEN INFECT AGNT CONCNTJ: CPT

## 2021-04-30 PROCEDURE — 2720000010 HC SURG SUPPLY STERILE: Performed by: NEUROLOGICAL SURGERY

## 2021-04-30 PROCEDURE — 85730 THROMBOPLASTIN TIME PARTIAL: CPT

## 2021-04-30 PROCEDURE — 71045 X-RAY EXAM CHEST 1 VIEW: CPT

## 2021-04-30 PROCEDURE — 2709999900 HC NON-CHARGEABLE SUPPLY: Performed by: NEUROLOGICAL SURGERY

## 2021-04-30 PROCEDURE — 7100000000 HC PACU RECOVERY - FIRST 15 MIN: Performed by: NEUROLOGICAL SURGERY

## 2021-04-30 PROCEDURE — 6360000002 HC RX W HCPCS

## 2021-04-30 PROCEDURE — 0PU43JZ SUPPLEMENT THORACIC VERTEBRA WITH SYNTHETIC SUBSTITUTE, PERCUTANEOUS APPROACH: ICD-10-PCS | Performed by: NEUROLOGICAL SURGERY

## 2021-04-30 PROCEDURE — 2580000003 HC RX 258: Performed by: PHYSICIAN ASSISTANT

## 2021-04-30 PROCEDURE — C1713 ANCHOR/SCREW BN/BN,TIS/BN: HCPCS | Performed by: NEUROLOGICAL SURGERY

## 2021-04-30 PROCEDURE — 88305 TISSUE EXAM BY PATHOLOGIST: CPT

## 2021-04-30 PROCEDURE — 87102 FUNGUS ISOLATION CULTURE: CPT

## 2021-04-30 PROCEDURE — 2700000000 HC OXYGEN THERAPY PER DAY

## 2021-04-30 PROCEDURE — 87206 SMEAR FLUORESCENT/ACID STAI: CPT

## 2021-04-30 PROCEDURE — 0PS43ZZ REPOSITION THORACIC VERTEBRA, PERCUTANEOUS APPROACH: ICD-10-PCS | Performed by: NEUROLOGICAL SURGERY

## 2021-04-30 PROCEDURE — 2580000003 HC RX 258

## 2021-04-30 PROCEDURE — 2580000003 HC RX 258: Performed by: NEUROLOGICAL SURGERY

## 2021-04-30 PROCEDURE — 88307 TISSUE EXAM BY PATHOLOGIST: CPT

## 2021-04-30 PROCEDURE — 2500000003 HC RX 250 WO HCPCS

## 2021-04-30 PROCEDURE — 85610 PROTHROMBIN TIME: CPT

## 2021-04-30 PROCEDURE — 3600000004 HC SURGERY LEVEL 4 BASE: Performed by: NEUROLOGICAL SURGERY

## 2021-04-30 PROCEDURE — 88342 IMHCHEM/IMCYTCHM 1ST ANTB: CPT

## 2021-04-30 PROCEDURE — 87075 CULTR BACTERIA EXCEPT BLOOD: CPT

## 2021-04-30 DEVICE — CEMENT C01A KYPHX HV-R BONE CEMENT EN
Type: IMPLANTABLE DEVICE | Site: SPINE THORACIC | Status: FUNCTIONAL
Brand: KYPHON® HV-R® BONE CEMENT

## 2021-04-30 RX ORDER — PROMETHAZINE HYDROCHLORIDE 25 MG/1
12.5 TABLET ORAL EVERY 6 HOURS PRN
Status: DISCONTINUED | OUTPATIENT
Start: 2021-04-30 | End: 2021-05-14 | Stop reason: HOSPADM

## 2021-04-30 RX ORDER — ONDANSETRON 2 MG/ML
4 INJECTION INTRAMUSCULAR; INTRAVENOUS EVERY 6 HOURS PRN
Status: DISCONTINUED | OUTPATIENT
Start: 2021-04-30 | End: 2021-05-14 | Stop reason: HOSPADM

## 2021-04-30 RX ORDER — LIDOCAINE HYDROCHLORIDE 20 MG/ML
INJECTION, SOLUTION INTRAVENOUS PRN
Status: DISCONTINUED | OUTPATIENT
Start: 2021-04-30 | End: 2021-04-30 | Stop reason: SDUPTHER

## 2021-04-30 RX ORDER — SODIUM CHLORIDE 0.9 % (FLUSH) 0.9 %
5-40 SYRINGE (ML) INJECTION PRN
Status: CANCELLED | OUTPATIENT
Start: 2021-04-30

## 2021-04-30 RX ORDER — ROCURONIUM BROMIDE 10 MG/ML
INJECTION, SOLUTION INTRAVENOUS PRN
Status: DISCONTINUED | OUTPATIENT
Start: 2021-04-30 | End: 2021-04-30 | Stop reason: SDUPTHER

## 2021-04-30 RX ORDER — FENTANYL CITRATE 50 UG/ML
INJECTION, SOLUTION INTRAMUSCULAR; INTRAVENOUS PRN
Status: DISCONTINUED | OUTPATIENT
Start: 2021-04-30 | End: 2021-04-30 | Stop reason: SDUPTHER

## 2021-04-30 RX ORDER — SODIUM CHLORIDE 0.9 % (FLUSH) 0.9 %
5-40 SYRINGE (ML) INJECTION PRN
Status: DISCONTINUED | OUTPATIENT
Start: 2021-04-30 | End: 2021-05-14 | Stop reason: HOSPADM

## 2021-04-30 RX ORDER — PROPOFOL 10 MG/ML
INJECTION, EMULSION INTRAVENOUS PRN
Status: DISCONTINUED | OUTPATIENT
Start: 2021-04-30 | End: 2021-04-30 | Stop reason: SDUPTHER

## 2021-04-30 RX ORDER — BUPIVACAINE HYDROCHLORIDE 2.5 MG/ML
INJECTION, SOLUTION EPIDURAL; INFILTRATION; INTRACAUDAL PRN
Status: DISCONTINUED | OUTPATIENT
Start: 2021-04-30 | End: 2021-04-30 | Stop reason: ALTCHOICE

## 2021-04-30 RX ORDER — SODIUM CHLORIDE 9 MG/ML
INJECTION, SOLUTION INTRAVENOUS CONTINUOUS
Status: DISCONTINUED | OUTPATIENT
Start: 2021-04-30 | End: 2021-05-05

## 2021-04-30 RX ORDER — MEPERIDINE HYDROCHLORIDE 25 MG/ML
12.5 INJECTION INTRAMUSCULAR; INTRAVENOUS; SUBCUTANEOUS EVERY 5 MIN PRN
Status: DISCONTINUED | OUTPATIENT
Start: 2021-04-30 | End: 2021-04-30 | Stop reason: HOSPADM

## 2021-04-30 RX ORDER — SODIUM CHLORIDE 0.9 % (FLUSH) 0.9 %
5-40 SYRINGE (ML) INJECTION EVERY 12 HOURS SCHEDULED
Status: CANCELLED | OUTPATIENT
Start: 2021-04-30

## 2021-04-30 RX ORDER — OXYCODONE HCL 10 MG/1
10 TABLET, FILM COATED, EXTENDED RELEASE ORAL EVERY 12 HOURS SCHEDULED
Status: DISCONTINUED | OUTPATIENT
Start: 2021-04-30 | End: 2021-05-05

## 2021-04-30 RX ORDER — LABETALOL HYDROCHLORIDE 5 MG/ML
5 INJECTION, SOLUTION INTRAVENOUS EVERY 10 MIN PRN
Status: DISCONTINUED | OUTPATIENT
Start: 2021-04-30 | End: 2021-04-30 | Stop reason: HOSPADM

## 2021-04-30 RX ORDER — ONDANSETRON 2 MG/ML
INJECTION INTRAMUSCULAR; INTRAVENOUS PRN
Status: DISCONTINUED | OUTPATIENT
Start: 2021-04-30 | End: 2021-04-30 | Stop reason: SDUPTHER

## 2021-04-30 RX ORDER — SODIUM CHLORIDE 9 MG/ML
25 INJECTION, SOLUTION INTRAVENOUS PRN
Status: CANCELLED | OUTPATIENT
Start: 2021-04-30

## 2021-04-30 RX ORDER — DEXAMETHASONE SODIUM PHOSPHATE 10 MG/ML
INJECTION INTRAMUSCULAR; INTRAVENOUS PRN
Status: DISCONTINUED | OUTPATIENT
Start: 2021-04-30 | End: 2021-04-30 | Stop reason: SDUPTHER

## 2021-04-30 RX ORDER — GLYCOPYRROLATE 1 MG/5 ML
SYRINGE (ML) INTRAVENOUS PRN
Status: DISCONTINUED | OUTPATIENT
Start: 2021-04-30 | End: 2021-04-30 | Stop reason: SDUPTHER

## 2021-04-30 RX ORDER — VANCOMYCIN HYDROCHLORIDE 500 MG/10ML
INJECTION, POWDER, LYOPHILIZED, FOR SOLUTION INTRAVENOUS PRN
Status: DISCONTINUED | OUTPATIENT
Start: 2021-04-30 | End: 2021-04-30 | Stop reason: ALTCHOICE

## 2021-04-30 RX ORDER — NEOSTIGMINE METHYLSULFATE 1 MG/ML
INJECTION, SOLUTION INTRAVENOUS PRN
Status: DISCONTINUED | OUTPATIENT
Start: 2021-04-30 | End: 2021-04-30 | Stop reason: SDUPTHER

## 2021-04-30 RX ORDER — CEFAZOLIN SODIUM 1 G/3ML
INJECTION, POWDER, FOR SOLUTION INTRAMUSCULAR; INTRAVENOUS PRN
Status: DISCONTINUED | OUTPATIENT
Start: 2021-04-30 | End: 2021-04-30 | Stop reason: SDUPTHER

## 2021-04-30 RX ORDER — PHENYLEPHRINE HCL IN 0.9% NACL 1 MG/10 ML
SYRINGE (ML) INTRAVENOUS PRN
Status: DISCONTINUED | OUTPATIENT
Start: 2021-04-30 | End: 2021-04-30 | Stop reason: SDUPTHER

## 2021-04-30 RX ORDER — PROMETHAZINE HYDROCHLORIDE 25 MG/ML
25 INJECTION, SOLUTION INTRAMUSCULAR; INTRAVENOUS PRN
Status: DISCONTINUED | OUTPATIENT
Start: 2021-04-30 | End: 2021-04-30 | Stop reason: HOSPADM

## 2021-04-30 RX ORDER — LIDOCAINE HYDROCHLORIDE AND EPINEPHRINE 10; 10 MG/ML; UG/ML
INJECTION, SOLUTION INFILTRATION; PERINEURAL PRN
Status: DISCONTINUED | OUTPATIENT
Start: 2021-04-30 | End: 2021-04-30 | Stop reason: ALTCHOICE

## 2021-04-30 RX ORDER — MIDAZOLAM HYDROCHLORIDE 1 MG/ML
INJECTION INTRAMUSCULAR; INTRAVENOUS PRN
Status: DISCONTINUED | OUTPATIENT
Start: 2021-04-30 | End: 2021-04-30 | Stop reason: SDUPTHER

## 2021-04-30 RX ORDER — SODIUM CHLORIDE 9 MG/ML
25 INJECTION, SOLUTION INTRAVENOUS PRN
Status: DISCONTINUED | OUTPATIENT
Start: 2021-04-30 | End: 2021-05-14 | Stop reason: HOSPADM

## 2021-04-30 RX ORDER — SODIUM CHLORIDE 9 MG/ML
INJECTION, SOLUTION INTRAVENOUS CONTINUOUS PRN
Status: DISCONTINUED | OUTPATIENT
Start: 2021-04-30 | End: 2021-04-30 | Stop reason: SDUPTHER

## 2021-04-30 RX ADMIN — SODIUM CHLORIDE: 9 INJECTION, SOLUTION INTRAVENOUS at 17:30

## 2021-04-30 RX ADMIN — LAMOTRIGINE 100 MG: 100 TABLET ORAL at 21:16

## 2021-04-30 RX ADMIN — Medication 100 MCG: at 18:46

## 2021-04-30 RX ADMIN — GABAPENTIN 300 MG: 300 CAPSULE ORAL at 08:28

## 2021-04-30 RX ADMIN — FENTANYL CITRATE 50 MCG: 50 INJECTION, SOLUTION INTRAMUSCULAR; INTRAVENOUS at 18:06

## 2021-04-30 RX ADMIN — HYDROMORPHONE HYDROCHLORIDE 1 MG: 1 INJECTION, SOLUTION INTRAMUSCULAR; INTRAVENOUS; SUBCUTANEOUS at 21:16

## 2021-04-30 RX ADMIN — FENTANYL CITRATE 100 MCG: 50 INJECTION, SOLUTION INTRAMUSCULAR; INTRAVENOUS at 17:39

## 2021-04-30 RX ADMIN — HYDROMORPHONE HYDROCHLORIDE 1 MG: 1 INJECTION, SOLUTION INTRAMUSCULAR; INTRAVENOUS; SUBCUTANEOUS at 23:53

## 2021-04-30 RX ADMIN — OXYCODONE HYDROCHLORIDE 10 MG: 10 TABLET ORAL at 15:07

## 2021-04-30 RX ADMIN — OXYCODONE HYDROCHLORIDE 10 MG: 10 TABLET ORAL at 10:38

## 2021-04-30 RX ADMIN — FENTANYL CITRATE 50 MCG: 50 INJECTION, SOLUTION INTRAMUSCULAR; INTRAVENOUS at 18:29

## 2021-04-30 RX ADMIN — LIDOCAINE HYDROCHLORIDE 80 MG: 20 INJECTION, SOLUTION INTRAVENOUS at 17:39

## 2021-04-30 RX ADMIN — CYCLOBENZAPRINE 10 MG: 10 TABLET, FILM COATED ORAL at 15:07

## 2021-04-30 RX ADMIN — HYDROMORPHONE HYDROCHLORIDE 1 MG: 1 INJECTION, SOLUTION INTRAMUSCULAR; INTRAVENOUS; SUBCUTANEOUS at 12:43

## 2021-04-30 RX ADMIN — Medication 0.6 MG: at 18:54

## 2021-04-30 RX ADMIN — HYDROMORPHONE HYDROCHLORIDE 1 MG: 1 INJECTION, SOLUTION INTRAMUSCULAR; INTRAVENOUS; SUBCUTANEOUS at 08:27

## 2021-04-30 RX ADMIN — INSULIN LISPRO 1 UNITS: 100 INJECTION, SOLUTION INTRAVENOUS; SUBCUTANEOUS at 21:18

## 2021-04-30 RX ADMIN — CYCLOBENZAPRINE 10 MG: 10 TABLET, FILM COATED ORAL at 08:28

## 2021-04-30 RX ADMIN — Medication 50 MCG: at 18:40

## 2021-04-30 RX ADMIN — Medication 50 MCG: at 18:37

## 2021-04-30 RX ADMIN — DEXAMETHASONE SODIUM PHOSPHATE 10 MG: 10 INJECTION INTRAMUSCULAR; INTRAVENOUS at 17:45

## 2021-04-30 RX ADMIN — MIDAZOLAM 2 MG: 1 INJECTION INTRAMUSCULAR; INTRAVENOUS at 17:32

## 2021-04-30 RX ADMIN — GABAPENTIN 300 MG: 300 CAPSULE ORAL at 21:16

## 2021-04-30 RX ADMIN — HYDROMORPHONE HYDROCHLORIDE 1 MG: 1 INJECTION, SOLUTION INTRAMUSCULAR; INTRAVENOUS; SUBCUTANEOUS at 03:31

## 2021-04-30 RX ADMIN — ONDANSETRON HYDROCHLORIDE 4 MG: 2 INJECTION, SOLUTION INTRAMUSCULAR; INTRAVENOUS at 18:38

## 2021-04-30 RX ADMIN — OXYCODONE HYDROCHLORIDE 10 MG: 10 TABLET ORAL at 05:11

## 2021-04-30 RX ADMIN — Medication 10 ML: at 21:17

## 2021-04-30 RX ADMIN — PROPOFOL 200 MG: 10 INJECTION, EMULSION INTRAVENOUS at 17:39

## 2021-04-30 RX ADMIN — CEFAZOLIN 3000 MG: 1 INJECTION, POWDER, FOR SOLUTION INTRAMUSCULAR; INTRAVENOUS at 18:00

## 2021-04-30 RX ADMIN — ACETAMINOPHEN 1000 MG: 500 TABLET, FILM COATED ORAL at 21:15

## 2021-04-30 RX ADMIN — OXYCODONE HYDROCHLORIDE 10 MG: 10 TABLET, FILM COATED, EXTENDED RELEASE ORAL at 23:51

## 2021-04-30 RX ADMIN — GABAPENTIN 300 MG: 300 CAPSULE ORAL at 15:07

## 2021-04-30 RX ADMIN — Medication 10 ML: at 08:28

## 2021-04-30 RX ADMIN — Medication 100 MCG: at 18:41

## 2021-04-30 RX ADMIN — ACETAMINOPHEN 1000 MG: 500 TABLET, FILM COATED ORAL at 08:28

## 2021-04-30 RX ADMIN — LISINOPRIL 20 MG: 20 TABLET ORAL at 08:29

## 2021-04-30 RX ADMIN — POLYETHYLENE GLYCOL 3350 17 G: 17 POWDER, FOR SOLUTION ORAL at 21:17

## 2021-04-30 RX ADMIN — ROCURONIUM BROMIDE 40 MG: 10 INJECTION, SOLUTION INTRAVENOUS at 17:39

## 2021-04-30 RX ADMIN — SODIUM CHLORIDE: 9 INJECTION, SOLUTION INTRAVENOUS at 18:48

## 2021-04-30 RX ADMIN — CYCLOBENZAPRINE 10 MG: 10 TABLET, FILM COATED ORAL at 21:16

## 2021-04-30 RX ADMIN — SODIUM CHLORIDE: 9 INJECTION, SOLUTION INTRAVENOUS at 21:15

## 2021-04-30 RX ADMIN — Medication 3 MG: at 18:54

## 2021-04-30 RX ADMIN — SENNOSIDES AND DOCUSATE SODIUM 1 TABLET: 8.6; 5 TABLET ORAL at 21:17

## 2021-04-30 RX ADMIN — OXYCODONE HYDROCHLORIDE 10 MG: 10 TABLET ORAL at 22:21

## 2021-04-30 RX ADMIN — HYDROMORPHONE HYDROCHLORIDE 1 MG: 1 INJECTION, SOLUTION INTRAMUSCULAR; INTRAVENOUS; SUBCUTANEOUS at 16:08

## 2021-04-30 RX ADMIN — ACETAMINOPHEN 1000 MG: 500 TABLET, FILM COATED ORAL at 15:07

## 2021-04-30 ASSESSMENT — PULMONARY FUNCTION TESTS
PIF_VALUE: 22
PIF_VALUE: 1
PIF_VALUE: 23
PIF_VALUE: 5
PIF_VALUE: 23
PIF_VALUE: 1
PIF_VALUE: 5
PIF_VALUE: 18
PIF_VALUE: 23
PIF_VALUE: 22
PIF_VALUE: 23
PIF_VALUE: 22
PIF_VALUE: 22
PIF_VALUE: 1
PIF_VALUE: 23
PIF_VALUE: 21
PIF_VALUE: 22
PIF_VALUE: 23
PIF_VALUE: 1
PIF_VALUE: 22
PIF_VALUE: 23
PIF_VALUE: 23
PIF_VALUE: 21
PIF_VALUE: 23
PIF_VALUE: 22
PIF_VALUE: 22
PIF_VALUE: 23
PIF_VALUE: 22
PIF_VALUE: 22
PIF_VALUE: 23
PIF_VALUE: 22
PIF_VALUE: 1
PIF_VALUE: 23
PIF_VALUE: 21
PIF_VALUE: 21
PIF_VALUE: 4
PIF_VALUE: 12
PIF_VALUE: 23
PIF_VALUE: 3
PIF_VALUE: 23
PIF_VALUE: 1
PIF_VALUE: 21
PIF_VALUE: 22
PIF_VALUE: 21
PIF_VALUE: 22
PIF_VALUE: 1
PIF_VALUE: 21
PIF_VALUE: 27
PIF_VALUE: 21

## 2021-04-30 ASSESSMENT — PAIN DESCRIPTION - DESCRIPTORS
DESCRIPTORS: ACHING;DISCOMFORT
DESCRIPTORS: ACHING;CONSTANT;DISCOMFORT

## 2021-04-30 ASSESSMENT — PAIN SCALES - GENERAL
PAINLEVEL_OUTOF10: 10
PAINLEVEL_OUTOF10: 7
PAINLEVEL_OUTOF10: 0
PAINLEVEL_OUTOF10: 3
PAINLEVEL_OUTOF10: 10
PAINLEVEL_OUTOF10: 3
PAINLEVEL_OUTOF10: 10
PAINLEVEL_OUTOF10: 7
PAINLEVEL_OUTOF10: 10
PAINLEVEL_OUTOF10: 10
PAINLEVEL_OUTOF10: 8
PAINLEVEL_OUTOF10: 10
PAINLEVEL_OUTOF10: 10
PAINLEVEL_OUTOF10: 9
PAINLEVEL_OUTOF10: 4
PAINLEVEL_OUTOF10: 3
PAINLEVEL_OUTOF10: 3
PAINLEVEL_OUTOF10: 8
PAINLEVEL_OUTOF10: 7
PAINLEVEL_OUTOF10: 0

## 2021-04-30 ASSESSMENT — PAIN DESCRIPTION - LOCATION
LOCATION: BACK;LEG
LOCATION: BACK
LOCATION: BACK;LEG

## 2021-04-30 ASSESSMENT — PAIN DESCRIPTION - PAIN TYPE
TYPE: ACUTE PAIN

## 2021-04-30 ASSESSMENT — LIFESTYLE VARIABLES: SMOKING_STATUS: 1

## 2021-04-30 ASSESSMENT — PAIN DESCRIPTION - ORIENTATION
ORIENTATION: LOWER;LEFT;RIGHT
ORIENTATION: LOWER;LEFT;RIGHT

## 2021-04-30 NOTE — PROGRESS NOTES
[address] [place] [address] [facility]  Progress Note      Chief Complaint   Patient presents with    Back Pain     middle back; \"feels like a knot\"    Leg Pain     both thighs       SUBJECTIVE:  Patient is tolerating medications. No reported adverse drug reactions. No nausea, vomiting, diarrhea. For surgery today. Review of systems:  As stated above in the chief complaint, otherwise negative. Medications:  Scheduled Meds:   cyclobenzaprine  10 mg Oral TID    sennosides-docusate sodium  1 tablet Oral BID    polyethylene glycol  17 g Oral BID    gabapentin  300 mg Oral TID    nicotine  1 patch Transdermal Daily    acetaminophen  1,000 mg Oral TID    lamoTRIgine  100 mg Oral BID    sodium chloride flush  10 mL Intravenous 2 times per day    insulin lispro  0-6 Units Subcutaneous TID WC    insulin lispro  0-3 Units Subcutaneous Nightly    lisinopril  20 mg Oral Daily     Continuous Infusions:   sodium chloride      dextrose       PRN Meds:oxyCODONE **OR** oxyCODONE, HYDROmorphone **OR** HYDROmorphone, sodium chloride flush, sodium chloride, potassium chloride **OR** potassium alternative oral replacement **OR** potassium chloride, magnesium hydroxide, glucose, dextrose, glucagon (rDNA), dextrose, trimethobenzamide    OBJECTIVE:  BP (!) 184/84   Pulse 85   Temp 98 °F (36.7 °C) (Temporal)   Resp 18   Ht 6' (1.829 m)   Wt 278 lb (126.1 kg)   SpO2 96%   BMI 37.70 kg/m²   Temp  Av.3 °F (36.8 °C)  Min: 98 °F (36.7 °C)  Max: 98.7 °F (37.1 °C)  Constitutional: The patient is awake, alert, and oriented. Skin: Warm and dry. No rashes were noted. HEENT: Round and reactive pupils. Moist mucous membranes. No ulcerations or thrush. Neck: Supple to movements. Chest: No use of accessory muscles to breathe. Symmetrical expansion. No wheezing, crackles or rhonchi. Cardiovascular: S1 and S2 are rhythmic and regular. No murmurs appreciated.    Abdomen: Positive bowel sounds to auscultation. Benign to palpation. No masses felt. No hepatosplenomegaly. Genitourinary: male  Extremities: No clubbing, no cyanosis, no edema. Neurological: No focal motor or sensory loss however there is some weakness in lower extremities bilaterally  Lines: peripheral    Laboratory and Tests Review:  Lab Results   Component Value Date    WBC 9.6 04/28/2021    WBC 8.9 04/27/2021    WBC 10.0 04/26/2021    HGB 13.6 04/28/2021    HCT 44.0 04/28/2021    MCV 85.6 04/28/2021     04/28/2021     Lab Results   Component Value Date    NEUTROABS 6.53 04/28/2021    NEUTROABS 4.00 04/27/2021    NEUTROABS 5.12 04/26/2021     No results found for: [facility]  Lab Results   Component Value Date    ALT 13 04/28/2021    AST 10 04/28/2021    ALKPHOS 76 04/28/2021    BILITOT <0.2 04/28/2021     Lab Results   Component Value Date     04/29/2021    K 4.4 04/29/2021    CL 99 04/29/2021    CO2 27 04/29/2021    BUN 14 04/29/2021    CREATININE 0.6 04/29/2021    CREATININE 0.7 04/28/2021    CREATININE 0.7 04/27/2021    GFRAA >60 04/29/2021    LABGLOM >60 04/29/2021    GLUCOSE 161 04/29/2021    PROT 6.8 04/28/2021    LABALBU 3.5 04/28/2021    CALCIUM 9.2 04/29/2021    BILITOT <0.2 04/28/2021    ALKPHOS 76 04/28/2021    AST 10 04/28/2021    ALT 13 04/28/2021     Lab Results   Component Value Date    CRP 1.0 (H) 04/28/2021    CRP 3.0 (H) 04/26/2021     Lab Results   Component Value Date    SEDRATE 57 (H) 04/28/2021    SEDRATE 68 (H) 04/26/2021     Radiology:  MRI  Acute to subacute superior endplate compression fracture of T12 with 40%   height loss and 5 mm retropulsion into the spinal canal.  There is associated   extensive anterior epidural hematoma at this level which contributes to   severe canal stenosis with cord compression at the level of T12 and moderate   bilateral neural foraminal narrowing at T11-T12 and T12-L1. No evidence of spondylo discitis.      Microbiology:   Lab Results   Component Value Date    BC 24 Hours no growth 04/25/2021     Lab Results   Component Value Date    BLOODCULT2 24 Hours no growth 04/25/2021     No results found for: WNDABS  No results found for: RESPSMEAR  No results found for: MPNEUMO, CLAMYDCU, LABLEGI, AFBCX, FUNGSM, LABFUNG  No results found for: CULTRESP  No results found for: CXCATHTIP  No results found for: BFCS  No results found for: CXSURG  Urine Culture, Routine   Date Value Ref Range Status   04/25/2021 Growth not present  Final     No results found for: [facility]     Assessment:  · T11-T12 compression fracture with spinal stenosis; I doubt that he has discitis however at this point in time any biopsy or CT-guided aspiration will be unproductive in terms of infection because he has been on Bactrim followed by Rocephin.     Plan:    · MRI did not show discitis. Hematoma present  · For T12 laminectomy and decompression  with evacuation of hematoma versus abscess today  · Agree with holding antibiotics at this point  · Check cultures  · Monitor labs  · Will follow with you     Thank you for having us see this patient in consultation    Electronically signed by KARLA Alejandro CNP on 4/30/2021 at 10:59 AM     Pt seen and examined. Above discussed agree with advanced practice nurse. Labs, cultures, and radiographs reviewed. Face to Face encounter occurred. Changes made as necessary.      Ellen Pemberton MD

## 2021-04-30 NOTE — PLAN OF CARE
Problem: Falls - Risk of:  Goal: Will remain free from falls  Description: Will remain free from falls  4/30/2021 1114 by Karina Parra  Outcome: Met This Shift  4/30/2021 0744 by Jazmine Staples RN  Outcome: Met This Shift  Goal: Absence of physical injury  Description: Absence of physical injury  4/30/2021 1114 by Karinamargo Parra  Outcome: Met This Shift  4/30/2021 0744 by Jazmine Staples RN  Outcome: Met This Shift     Problem: Skin Integrity:  Goal: Will show no infection signs and symptoms  Description: Will show no infection signs and symptoms  4/30/2021 1114 by Karinamargo Parra  Outcome: Met This Shift  4/30/2021 0744 by Jazmine Staples RN  Outcome: Met This Shift  Goal: Absence of new skin breakdown  Description: Absence of new skin breakdown  4/30/2021 1114 by Karinamargo Parra  Outcome: Met This Shift  4/30/2021 0744 by Jazmine Staples RN  Outcome: Met This Shift     Problem: Neurological  Goal: Maximum potential motor/sensory/cognitive function  Outcome: Met This Shift     Problem: Pain:  Goal: Pain level will decrease  Description: Pain level will decrease  4/30/2021 1114 by Karina Parra  Outcome: Ongoing  4/30/2021 0744 by Jazmine Staples RN  Outcome: Ongoing  Goal: Control of acute pain  Description: Control of acute pain  4/30/2021 1114 by Karina Parra  Outcome: Ongoing  4/30/2021 0744 by Jazmine Staples RN  Outcome: Ongoing  Goal: Control of chronic pain  Description: Control of chronic pain  4/30/2021 1114 by Karina Parra  Outcome: Ongoing  4/30/2021 0744 by Jazmine Staples RN  Outcome: Ongoing

## 2021-04-30 NOTE — PLAN OF CARE
Problem: Falls - Risk of:  Goal: Will remain free from falls  Description: Will remain free from falls  4/30/2021 0744 by Rigoberto Villar RN  Outcome: Met This Shift  4/29/2021 2015 by Miladis Garrison  Outcome: Met This Shift  Goal: Absence of physical injury  Description: Absence of physical injury  4/30/2021 0744 by Rigoberto Villar RN  Outcome: Met This Shift  4/29/2021 2015 by Miladis Garrison  Outcome: Met This Shift     Problem: Skin Integrity:  Goal: Will show no infection signs and symptoms  Description: Will show no infection signs and symptoms  4/30/2021 0744 by Rigoberto Villar RN  Outcome: Met This Shift  4/29/2021 2015 by Miladis Garrison  Outcome: Met This Shift  Goal: Absence of new skin breakdown  Description: Absence of new skin breakdown  4/30/2021 0744 by Rigoberto Villar RN  Outcome: Met This Shift  4/29/2021 2015 by Miladis Garrison  Outcome: Met This Shift     Problem: Neurological  Goal: Maximum potential motor/sensory/cognitive function  4/29/2021 2015 by Miladis Garrison  Outcome: Met This Shift     Problem: Pain:  Goal: Pain level will decrease  Description: Pain level will decrease  4/30/2021 0744 by Rigoberto Villar RN  Outcome: Ongoing  4/29/2021 2015 by Miladis Garrison  Outcome: Met This Shift  Goal: Control of acute pain  Description: Control of acute pain  4/30/2021 0744 by Rigoberto Villar RN  Outcome: Ongoing  4/29/2021 2015 by Miladis Garrison  Outcome: Met This Shift  Goal: Control of chronic pain  Description: Control of chronic pain  4/30/2021 0744 by Rigoberto Villar RN  Outcome: Ongoing  4/29/2021 2015 by Miladis Garrison  Outcome: Met This Shift

## 2021-04-30 NOTE — PROGRESS NOTES
Date: 2021       Patient Name: Reyes Anand  : 1965      MRN: [id number]    PT held, pending surgical intervention. Will follow up post operatively.      Mitch Reyes PT, DPT  [id number]

## 2021-04-30 NOTE — PROGRESS NOTES
Chief Complaint:  Chief Complaint   Patient presents with    Back Pain     middle back; \"feels like a knot\"    Leg Pain     both thighs     Burst fracture of twelfth thoracic vertebra (HCC)     Subjective:    He continues to have pain, but no weakness or numbness. After numerous discussions with all three of our neurosurgeons, and numerous conversations with  (their [address] ended up refusing the transfer due to lack of insurance), Dr. Celio Randolph has graciously offered to perform the surgery. He got up in middle of night to go to restroom despite knowing he's on bed rest.  He says it took too long for the nurse to get him the bedpan. Objective:    BP (!) 184/84   Pulse 85   Temp 98 °F (36.7 °C) (Temporal)   Resp 18   Ht 6' (1.829 m)   Wt 278 lb (126.1 kg)   SpO2 96%   BMI 37.70 kg/m²     Current medications that patient is taking have been reviewed. General appearance: NAD, conversant  HEENT: AT/NC, MMM  Neck: FROM, supple  Lungs: Clear to auscultation, WOB normal  CV: RRR, no MRGs  Abdomen: Soft, non-tender; no masses or HSM, +BS  Extremities: No peripheral edema or digital cyanosis  Skin: no rash, lesions or ulcers  Psych: Anxious  Neuro: Alert and interactive, face symmetric, moving all extremities, speech fluent. Strength 5/5 in B/L LE's.     Labs:  CBC with Differential:    Lab Results   Component Value Date    WBC 9.6 04/28/2021    RBC 5.14 04/28/2021    HGB 13.6 04/28/2021    HCT 44.0 04/28/2021     04/28/2021    MCV 85.6 04/28/2021    MCH 26.5 04/28/2021    MCHC 30.9 04/28/2021    RDW 13.0 04/28/2021    LYMPHOPCT 20.9 04/28/2021    MONOPCT 6.3 04/28/2021    BASOPCT 0.8 04/28/2021    MONOSABS 0.61 04/28/2021    LYMPHSABS 2.01 04/28/2021    EOSABS 0.35 04/28/2021    BASOSABS 0.08 04/28/2021     CMP:    Lab Results   Component Value Date     04/29/2021    K 4.4 04/29/2021    CL 99 04/29/2021    CO2 27 04/29/2021    BUN 14 04/29/2021    CREATININE 0.6 04/29/2021    GFRAA >60 04/29/2021 LABGLOM >60 04/29/2021    GLUCOSE 161 04/29/2021    PROT 6.8 04/28/2021    LABALBU 3.5 04/28/2021    CALCIUM 9.2 04/29/2021    BILITOT <0.2 04/28/2021    ALKPHOS 76 04/28/2021    AST 10 04/28/2021    ALT 13 04/28/2021        Assessment/Plan:  Principal Problem:    Burst fracture of twelfth thoracic vertebra (HCC)  Active Problems:    Hyponatremia    Diabetes mellitus (HCC)    Tobacco abuse    Adrenal nodule (HCC) - incidental    Spinal cord compression (HCC)    Acute thoracic back pain  Resolved Problems:    * No resolved hospital problems. *       To OR today for fusion and decompression    Reinforced strict bedrest until surgery    Doubt infection, hold antibiotics. Glucose generally well controlled    Requires continued inpatient level of care   Time spent: 35 minutes, at least 50% was on face-to-face counseling and coordination of care.    Gloria Hennessy    1:48 PM  4/30/2021

## 2021-04-30 NOTE — PLAN OF CARE
Problem: Pain:  Goal: Pain level will decrease  Description: Pain level will decrease  Outcome: Met This Shift  Goal: Control of acute pain  Description: Control of acute pain  Outcome: Met This Shift  Goal: Control of chronic pain  Description: Control of chronic pain  Outcome: Met This Shift     Problem: Falls - Risk of:  Goal: Will remain free from falls  Description: Will remain free from falls  Outcome: Met This Shift  Goal: Absence of physical injury  Description: Absence of physical injury  Outcome: Met This Shift     Problem: Skin Integrity:  Goal: Will show no infection signs and symptoms  Description: Will show no infection signs and symptoms  Outcome: Met This Shift  Goal: Absence of new skin breakdown  Description: Absence of new skin breakdown  Outcome: Met This Shift     Problem: Neurological  Goal: Maximum potential motor/sensory/cognitive function  Outcome: Met This Shift

## 2021-04-30 NOTE — ANESTHESIA PRE PROCEDURE
Department of Anesthesiology  Preprocedure Note       Name:  Macy Jamison   Age:  48 y.o.  :  1965                                          MRN:  [id number]         Date:  2021      Surgeon: Ruben Mitchell):  Moe Grayson MD    Procedure: Procedure(s):  T12 LAMINECTOMY WITH T12 KYPHO    Medications prior to admission:   Prior to Admission medications    Medication Sig Start Date End Date Taking? Authorizing Provider   lisinopril-hydroCHLOROthiazide (PRINZIDE;ZESTORETIC) 20-25 MG per tablet Take 1 tablet by mouth daily   Yes Historical Provider, MD   sulfamethoxazole-trimethoprim (BACTRIM DS;SEPTRA DS) 800-160 MG per tablet Take 1 tablet by mouth 2 times daily   Yes Historical Provider, MD   lamoTRIgine (LAMICTAL) 100 MG tablet Take 100 mg by mouth 2 times daily   Yes Historical Provider, MD   metFORMIN (GLUCOPHAGE) 1000 MG tablet Take 1,000 mg by mouth 2 times daily (with meals)   Yes Historical Provider, MD   pioglitazone (ACTOS) 15 MG tablet Take 15 mg by mouth daily   Yes Historical Provider, MD   traMADol (ULTRAM) 50 MG tablet Take 50 mg by mouth every 6 hours as needed for Pain.     Yes Historical Provider, MD       Current medications:    Current Facility-Administered Medications   Medication Dose Route Frequency Provider Last Rate Last Admin    cyclobenzaprine (FLEXERIL) tablet 10 mg  10 mg Oral TID Kianna Montano MD   10 mg at 21 150    sennosides-docusate sodium (SENOKOT-S) 8.6-50 MG tablet 1 tablet  1 tablet Oral BID Kianna Montano MD   1 tablet at 21    polyethylene glycol (GLYCOLAX) packet 17 g  17 g Oral BID Kianna Montano MD   17 g at 21    oxyCODONE (ROXICODONE) immediate release tablet 5 mg  5 mg Oral Q4H PRN Kianna Montano MD        Or   Mounika Márquez oxyCODONE HCl (OXY-IR) immediate release tablet 10 mg  10 mg Oral Q4H PRN Kianna Montano MD   10 mg at 21 150    gabapentin (NEURONTIN) capsule 300 mg  300 mg Oral TID Kianna Montano MD   300 mg at 21 150    200 mg Intramuscular Q6H PRN BLESSING Infante        lisinopril (PRINIVIL;ZESTRIL) tablet 20 mg  20 mg Oral Daily BLESSING Infante   20 mg at 04/30/21 7394       Allergies:  No Known Allergies    Problem List:    Patient Active Problem List   Diagnosis Code    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    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    Work related injury Y99.0    Hyponatremia E87.1    Diabetes mellitus ([address] E11.9    Tobacco abuse Z72.0    Adrenal nodule ([address] - incidental E27.8    Burst fracture of twelfth thoracic vertebra ([address] S22.081A    Spinal cord compression ([address] G95.20    Acute thoracic back pain M54.6       Past Medical History:        Diagnosis Date    Arthritis     right wrist    Diabetes mellitus ([address]     borderline, tries to watch diet    Difficult intubation     Hypertension     bp has been sporadic    Infected tooth 5/21/2014    saw dentist, placed on antibiotic, to call Dr. Alfie Perry office to inform    Pain     chronic, goes to pain clinic, placed on Methodone    Preoperative clearance 5/28/2014    dental- Dr. Marychuy Ochoa; paper copy on chart       Past Surgical History:        Procedure Laterality Date   [address] Right 2012    NECK SURGERY  2009,2011    cervical 2-6, fusion    WRIST ARTHROSCOPY  5/30/14    right with decompression       Social History:    Social History     Tobacco Use    Smoking status: Current Every Day Smoker     Packs/day: 0.25     Years: 22.00     Pack years: 5.50    Smokeless tobacco: Never Used   Substance Use Topics    Alcohol use:  No                                Ready to quit: Not Answered  Counseling given: Not Answered      Vital Signs (Current):   Vitals:    04/29/21 0803 04/29/21 1644 04/30/21 0000 04/30/21 0815   BP: (!) 149/77 (!) 148/77 (!) 156/73 (!) 184/84   Pulse: 84 82 88 85   Resp: 18 18 16 18   Temp: 99 °F (37.2 °C) 98.3 °F (36.8 °C) 98.7 °F (37.1 °C) 98 °F (36.7 °C)   TempSrc: Temporal Temporal Temporal Temporal   SpO2: 94% 97% 92% 96%   Weight:       Height:                                                  BP Readings from Last 3 Encounters:   04/30/21 (!) 184/84   04/16/21 (!) 170/86   03/23/15 128/74       NPO Status:                                                                                 BMI:   Wt Readings from Last 3 Encounters:   04/28/21 278 lb (126.1 kg)   04/16/21 265 lb (120.2 kg)   03/23/15 (!) 304 lb (137.9 kg)     Body mass index is 37.7 kg/m². CBC:   Lab Results   Component Value Date    WBC 9.6 04/28/2021    RBC 5.14 04/28/2021    HGB 13.6 04/28/2021    HCT 44.0 04/28/2021    MCV 85.6 04/28/2021    RDW 13.0 04/28/2021     04/28/2021       CMP:   Lab Results   Component Value Date     04/29/2021    K 4.4 04/29/2021    CL 99 04/29/2021    CO2 27 04/29/2021    BUN 14 04/29/2021    CREATININE 0.6 04/29/2021    GFRAA >60 04/29/2021    LABGLOM >60 04/29/2021    GLUCOSE 161 04/29/2021    PROT 6.8 04/28/2021    CALCIUM 9.2 04/29/2021    BILITOT <0.2 04/28/2021    ALKPHOS 76 04/28/2021    AST 10 04/28/2021    ALT 13 04/28/2021       POC Tests: No results for input(s): POCGLU, POCNA, POCK, POCCL, POCBUN, POCHEMO, POCHCT in the last 72 hours. Coags:   Lab Results   Component Value Date    PROTIME 12.2 04/30/2021    INR 1.1 04/30/2021    APTT 33.8 04/30/2021       HCG (If Applicable): No results found for: PREGTESTUR, PREGSERUM, HCG, HCGQUANT     ABGs: No results found for: PHART, PO2ART, PJP0VVP, ZQV3HSJ, BEART, F2EYZGIZ     Type & Screen (If Applicable):  No results found for: LABABO, LABRH    Drug/Infectious Status (If Applicable):  No results found for: HIV, HEPCAB    COVID-19 Screening (If Applicable): No results found for: COVID19        Anesthesia Evaluation  Patient summary reviewed   history of anesthetic complications: difficult airway. Airway: Mallampati: III  TM distance: >3 FB   Neck ROM: full  Mouth opening: > = 3 FB Dental:      Comment:  Many missing, broken teeth    Pulmonary: breath sounds clear to auscultation  (+) current smoker          Patient did not smoke on day of surgery. Cardiovascular:    (+) hypertension:,         Rhythm: regular  Rate: normal                    Neuro/Psych:                ROS comment: T12 compression Fx with radicular pain GI/Hepatic/Renal: Neg GI/Hepatic/Renal ROS            Endo/Other:    (+) DiabetesType II DM, , .                 Abdominal:   (+) obese,         Vascular:                                      Anesthesia Plan      general     ASA 3       Induction: intravenous. MIPS: Postoperative opioids intended and Prophylactic antiemetics administered. Anesthetic plan and risks discussed with patient. Plan discussed with CRNA.                   Pascual Dunbar MD   4/30/2021

## 2021-05-01 ENCOUNTER — APPOINTMENT (OUTPATIENT)
Dept: CT IMAGING | Age: 56
DRG: 321 | End: 2021-05-01
Payer: COMMERCIAL

## 2021-05-01 PROBLEM — D49.7 SPINAL CORD TUMOR: Status: ACTIVE | Noted: 2021-05-01

## 2021-05-01 PROBLEM — M84.40XA PATHOLOGIC FRACTURE: Status: ACTIVE | Noted: 2021-05-01

## 2021-05-01 LAB
BACTERIA: NORMAL /HPF
BILIRUBIN URINE: NEGATIVE
BLOOD, URINE: NEGATIVE
CLARITY: CLEAR
COLOR: YELLOW
GLUCOSE URINE: NEGATIVE MG/DL
GRAM STAIN ORDERABLE: NORMAL
KETONES, URINE: 15 MG/DL
LEUKOCYTE ESTERASE, URINE: NEGATIVE
METER GLUCOSE: 134 MG/DL (ref 74–99)
METER GLUCOSE: 142 MG/DL (ref 74–99)
METER GLUCOSE: 146 MG/DL (ref 74–99)
METER GLUCOSE: 177 MG/DL (ref 74–99)
METER GLUCOSE: 177 MG/DL (ref 74–99)
NITRITE, URINE: NEGATIVE
PH UA: 5.5 (ref 5–9)
PROSTATE SPECIFIC ANTIGEN: 1.44 NG/ML (ref 0–4)
PROTEIN UA: ABNORMAL MG/DL
RBC UA: NORMAL /HPF (ref 0–2)
SPECIFIC GRAVITY UA: 1.02 (ref 1–1.03)
UROBILINOGEN, URINE: 0.2 E.U./DL
WBC UA: NORMAL /HPF (ref 0–5)

## 2021-05-01 PROCEDURE — 2580000003 HC RX 258: Performed by: PHYSICIAN ASSISTANT

## 2021-05-01 PROCEDURE — 2700000000 HC OXYGEN THERAPY PER DAY

## 2021-05-01 PROCEDURE — 6360000002 HC RX W HCPCS: Performed by: NEUROLOGICAL SURGERY

## 2021-05-01 PROCEDURE — 6370000000 HC RX 637 (ALT 250 FOR IP): Performed by: PHYSICIAN ASSISTANT

## 2021-05-01 PROCEDURE — 88112 CYTOPATH CELL ENHANCE TECH: CPT

## 2021-05-01 PROCEDURE — 6360000002 HC RX W HCPCS: Performed by: PHYSICIAN ASSISTANT

## 2021-05-01 PROCEDURE — 6370000000 HC RX 637 (ALT 250 FOR IP): Performed by: INTERNAL MEDICINE

## 2021-05-01 PROCEDURE — 81001 URINALYSIS AUTO W/SCOPE: CPT

## 2021-05-01 PROCEDURE — 74177 CT ABD & PELVIS W/CONTRAST: CPT

## 2021-05-01 PROCEDURE — 6370000000 HC RX 637 (ALT 250 FOR IP): Performed by: NEUROLOGICAL SURGERY

## 2021-05-01 PROCEDURE — 71260 CT THORAX DX C+: CPT

## 2021-05-01 PROCEDURE — 1200000000 HC SEMI PRIVATE

## 2021-05-01 PROCEDURE — 82962 GLUCOSE BLOOD TEST: CPT

## 2021-05-01 PROCEDURE — 6360000004 HC RX CONTRAST MEDICATION: Performed by: RADIOLOGY

## 2021-05-01 RX ORDER — DIAZEPAM 5 MG/1
5 TABLET ORAL EVERY 6 HOURS PRN
Status: DISCONTINUED | OUTPATIENT
Start: 2021-05-01 | End: 2021-05-14 | Stop reason: HOSPADM

## 2021-05-01 RX ORDER — SODIUM CHLORIDE 0.9 % (FLUSH) 0.9 %
10 SYRINGE (ML) INJECTION
Status: ACTIVE | OUTPATIENT
Start: 2021-05-01 | End: 2021-05-01

## 2021-05-01 RX ORDER — SODIUM CHLORIDE 0.9 % (FLUSH) 0.9 %
5-40 SYRINGE (ML) INJECTION EVERY 12 HOURS SCHEDULED
Status: DISCONTINUED | OUTPATIENT
Start: 2021-05-01 | End: 2021-05-14 | Stop reason: HOSPADM

## 2021-05-01 RX ADMIN — DIAZEPAM 5 MG: 5 TABLET ORAL at 10:56

## 2021-05-01 RX ADMIN — ACETAMINOPHEN 1000 MG: 500 TABLET, FILM COATED ORAL at 08:51

## 2021-05-01 RX ADMIN — ACETAMINOPHEN 1000 MG: 500 TABLET, FILM COATED ORAL at 19:50

## 2021-05-01 RX ADMIN — OXYCODONE HYDROCHLORIDE 10 MG: 10 TABLET ORAL at 22:38

## 2021-05-01 RX ADMIN — GABAPENTIN 300 MG: 300 CAPSULE ORAL at 13:39

## 2021-05-01 RX ADMIN — CEFAZOLIN SODIUM 3000 MG: 10 INJECTION, POWDER, FOR SOLUTION INTRAVENOUS at 01:41

## 2021-05-01 RX ADMIN — DIAZEPAM 5 MG: 5 TABLET ORAL at 20:01

## 2021-05-01 RX ADMIN — HYDROMORPHONE HYDROCHLORIDE 1 MG: 1 INJECTION, SOLUTION INTRAMUSCULAR; INTRAVENOUS; SUBCUTANEOUS at 07:46

## 2021-05-01 RX ADMIN — INSULIN LISPRO 1 UNITS: 100 INJECTION, SOLUTION INTRAVENOUS; SUBCUTANEOUS at 16:56

## 2021-05-01 RX ADMIN — Medication 10 ML: at 19:52

## 2021-05-01 RX ADMIN — CEFAZOLIN SODIUM 3000 MG: 10 INJECTION, POWDER, FOR SOLUTION INTRAVENOUS at 10:56

## 2021-05-01 RX ADMIN — IOPAMIDOL 90 ML: 755 INJECTION, SOLUTION INTRAVENOUS at 10:12

## 2021-05-01 RX ADMIN — HYDROMORPHONE HYDROCHLORIDE 1 MG: 1 INJECTION, SOLUTION INTRAMUSCULAR; INTRAVENOUS; SUBCUTANEOUS at 02:17

## 2021-05-01 RX ADMIN — HYDROMORPHONE HYDROCHLORIDE 1 MG: 1 INJECTION, SOLUTION INTRAMUSCULAR; INTRAVENOUS; SUBCUTANEOUS at 05:04

## 2021-05-01 RX ADMIN — GABAPENTIN 300 MG: 300 CAPSULE ORAL at 08:52

## 2021-05-01 RX ADMIN — Medication 10 ML: at 08:52

## 2021-05-01 RX ADMIN — HYDROMORPHONE HYDROCHLORIDE 1 MG: 1 INJECTION, SOLUTION INTRAMUSCULAR; INTRAVENOUS; SUBCUTANEOUS at 15:07

## 2021-05-01 RX ADMIN — LAMOTRIGINE 100 MG: 100 TABLET ORAL at 08:51

## 2021-05-01 RX ADMIN — Medication 10 ML: at 10:12

## 2021-05-01 RX ADMIN — OXYCODONE HYDROCHLORIDE 10 MG: 10 TABLET ORAL at 18:30

## 2021-05-01 RX ADMIN — ACETAMINOPHEN 1000 MG: 500 TABLET, FILM COATED ORAL at 13:39

## 2021-05-01 RX ADMIN — LAMOTRIGINE 100 MG: 100 TABLET ORAL at 19:51

## 2021-05-01 RX ADMIN — OXYCODONE HYDROCHLORIDE 10 MG: 10 TABLET, FILM COATED, EXTENDED RELEASE ORAL at 19:51

## 2021-05-01 RX ADMIN — INSULIN LISPRO 1 UNITS: 100 INJECTION, SOLUTION INTRAVENOUS; SUBCUTANEOUS at 11:33

## 2021-05-01 RX ADMIN — OXYCODONE HYDROCHLORIDE 10 MG: 10 TABLET ORAL at 10:56

## 2021-05-01 RX ADMIN — SODIUM CHLORIDE: 9 INJECTION, SOLUTION INTRAVENOUS at 14:42

## 2021-05-01 RX ADMIN — HYDROMORPHONE HYDROCHLORIDE 1 MG: 1 INJECTION, SOLUTION INTRAMUSCULAR; INTRAVENOUS; SUBCUTANEOUS at 12:58

## 2021-05-01 RX ADMIN — HYDROMORPHONE HYDROCHLORIDE 1 MG: 1 INJECTION, SOLUTION INTRAMUSCULAR; INTRAVENOUS; SUBCUTANEOUS at 17:23

## 2021-05-01 RX ADMIN — HYDROMORPHONE HYDROCHLORIDE 1 MG: 1 INJECTION, SOLUTION INTRAMUSCULAR; INTRAVENOUS; SUBCUTANEOUS at 09:52

## 2021-05-01 RX ADMIN — HYDROMORPHONE HYDROCHLORIDE 1 MG: 1 INJECTION, SOLUTION INTRAMUSCULAR; INTRAVENOUS; SUBCUTANEOUS at 19:53

## 2021-05-01 RX ADMIN — HYDROMORPHONE HYDROCHLORIDE 1 MG: 1 INJECTION, SOLUTION INTRAMUSCULAR; INTRAVENOUS; SUBCUTANEOUS at 22:04

## 2021-05-01 RX ADMIN — GABAPENTIN 300 MG: 300 CAPSULE ORAL at 19:50

## 2021-05-01 RX ADMIN — INSULIN LISPRO 1 UNITS: 100 INJECTION, SOLUTION INTRAVENOUS; SUBCUTANEOUS at 08:52

## 2021-05-01 RX ADMIN — IOHEXOL 50 ML: 240 INJECTION, SOLUTION INTRATHECAL; INTRAVASCULAR; INTRAVENOUS; ORAL at 10:12

## 2021-05-01 RX ADMIN — LISINOPRIL 20 MG: 20 TABLET ORAL at 08:52

## 2021-05-01 RX ADMIN — OXYCODONE HYDROCHLORIDE 10 MG: 10 TABLET, FILM COATED, EXTENDED RELEASE ORAL at 08:51

## 2021-05-01 ASSESSMENT — PAIN DESCRIPTION - ONSET
ONSET: ON-GOING

## 2021-05-01 ASSESSMENT — PAIN SCALES - GENERAL
PAINLEVEL_OUTOF10: 0
PAINLEVEL_OUTOF10: 10
PAINLEVEL_OUTOF10: 8
PAINLEVEL_OUTOF10: 0
PAINLEVEL_OUTOF10: 10
PAINLEVEL_OUTOF10: 10
PAINLEVEL_OUTOF10: 8
PAINLEVEL_OUTOF10: 10
PAINLEVEL_OUTOF10: 8
PAINLEVEL_OUTOF10: 10
PAINLEVEL_OUTOF10: 10
PAINLEVEL_OUTOF10: 9
PAINLEVEL_OUTOF10: 8
PAINLEVEL_OUTOF10: 10
PAINLEVEL_OUTOF10: 10
PAINLEVEL_OUTOF10: 9
PAINLEVEL_OUTOF10: 7
PAINLEVEL_OUTOF10: 10
PAINLEVEL_OUTOF10: 9
PAINLEVEL_OUTOF10: 10

## 2021-05-01 ASSESSMENT — PAIN DESCRIPTION - DESCRIPTORS
DESCRIPTORS: ACHING;DISCOMFORT;SORE
DESCRIPTORS: ACHING;DULL;DISCOMFORT
DESCRIPTORS: ACHING;DULL;DISCOMFORT
DESCRIPTORS: ACHING;DISCOMFORT;SORE
DESCRIPTORS: ACHING;CONSTANT;DISCOMFORT
DESCRIPTORS: ACHING;DULL;DISCOMFORT
DESCRIPTORS: ACHING;CONSTANT;DISCOMFORT
DESCRIPTORS: ACHING;DULL;DISCOMFORT
DESCRIPTORS: ACHING;DISCOMFORT;SORE

## 2021-05-01 ASSESSMENT — PAIN DESCRIPTION - LOCATION
LOCATION: BACK
LOCATION: BACK;LEG
LOCATION: BACK
LOCATION: BACK
LOCATION: BACK;LEG
LOCATION: BACK
LOCATION: BACK
LOCATION: BACK;LEG
LOCATION: BACK
LOCATION: BACK;LEG
LOCATION: BACK

## 2021-05-01 ASSESSMENT — PAIN DESCRIPTION - PAIN TYPE
TYPE: SURGICAL PAIN
TYPE: ACUTE PAIN
TYPE: ACUTE PAIN
TYPE: SURGICAL PAIN
TYPE: ACUTE PAIN
TYPE: SURGICAL PAIN

## 2021-05-01 ASSESSMENT — PAIN DESCRIPTION - FREQUENCY
FREQUENCY: CONTINUOUS

## 2021-05-01 NOTE — ANESTHESIA POSTPROCEDURE EVALUATION
Department of Anesthesiology  Postprocedure Note    Patient: Jhony Barreto  MRN: [id number]  YOB: 1965  Date of evaluation: 5/1/2021  Time:  6:32 AM     Procedure Summary     Date: 04/30/21 Room / Location: [facility] OR 06 / [facility]    Anesthesia Start: 3428 Anesthesia Stop: 1919    Procedure: T12 LAMINECTOMY WITH T12 KYPHO (N/A Spine Thoracic) Diagnosis: (.)    Surgeons: Farzana Cantu MD Responsible Provider: Manuel Lucero MD    Anesthesia Type: general ASA Status: 3          Anesthesia Type: general    Chris Phase I: Chris Score: 10    Chris Phase II:      Last vitals: Reviewed and per EMR flowsheets.        Anesthesia Post Evaluation    Patient location during evaluation: PACU  Patient participation: complete - patient participated  Level of consciousness: awake  Airway patency: patent  Nausea & Vomiting: no nausea and no vomiting  Complications: no  Cardiovascular status: hemodynamically stable  Respiratory status: acceptable  Hydration status: stable

## 2021-05-01 NOTE — PROGRESS NOTES
vertebra [facility])  Active Problems:    Hyponatremia    Diabetes mellitus (HCC)    Tobacco abuse    Adrenal nodule (HCC) - incidental    Spinal cord compression (HCC)    Acute thoracic back pain    Pathologic fracture    Spinal cord tumor  Resolved Problems:    * No resolved hospital problems. *       Tumor found in the OR    Path pending    Neurofibromatosis? Onc consult    CT CAP to look for primary tumor    [Addendum]  Images personally reviewed. There's a large tumor on his R kidney. Concern for RCC metastasis? Consult Urology. May need to consider radiation to spine as well, will await Onc and Urology opinions first.  I've updated patient on all of these findings.     Activity per Neurosurgery    Glucose well controlled    Requires continued inpatient level of care   Herb Mantilla    11:32 AM  5/1/2021

## 2021-05-01 NOTE — OP NOTE
[address]                  [address] [facility],  [facility]                                OPERATIVE REPORT    PATIENT NAME: Georgina Dubose                    :        1965  MED REC NO:   [id number]                            ROOM:       93  ACCOUNT NO:   [de-identified]                           ADMIT DATE: 2021  PROVIDER:     Deepa Giang MD    DATE OF PROCEDURE:  2021    PREOPERATIVE DIAGNOSES:  1. Acute T12 pathologic fracture. 2.  T12 epidural mass. 3.  Thoracic stenosis at T12. POSTOPERATIVE DIAGNOSES:  1. Acute T12 pathologic fracture. 2.  T12 epidural mass. 3.  Thoracic stenosis at T12. OPERATIVE PROCEDURES:  1.  Bilateral T12 laminectomy for resection of ventral epidural mass. 2.  Left-sided unilateral percutaneous transpedicular approach to T12  vertebral body for T12 vertebral body bone biopsy and T12 vertebral body  balloon kyphoplasty with the use of Medtronic Kyphon balloon and  polymethylmethacrylate. 3.  Use of intraoperative fluoroscopy interpreted by myself, the  surgeon. 4.  AS modifier for Donny Paul [facility], who assisted with primary  exposure, primary closure, and retraction of neural elements. ANESTHESIA:  Generalized endotracheal anesthesia. SURGEON:  Deepa Giang M.D.    ASSISTANT:  Donny Paul PA-C.    COMPLICATIONS:  None. ESTIMATED BLOOD LOSS:  50 mL. SPECIMEN:  Ventral epidural mass. OPERATIVE INDICATIONS:  The patient is a 52-year-old gentleman who  presented to the hospital with back pain and pain, numbness, tingling,  and weakness in his legs. He initially had a CT that showed a T12  compression fracture.   He ultimately had an MRI that showed this was  more consistent with a pathologic fracture and a ventral epidural mass  that was suspicious for either hematoma, tumor, or abscess, and after  risks, benefits, and alternatives were discussed with the patient, it  was determined that he would undergo the above-listed procedure. Of note, SINDI Portillo's services were required as he was the only  qualified assistant to assist with primary exposure, primary closure,  and retraction of neural elements. OPERATIVE PROCEDURE:  The patient was brought into the operating room. A time-out was performed where he was identified by his name, medical  record number, and the operative procedure which he was about to  undergo. Next, induction of generalized endotracheal anesthesia was  then commenced. Upon completion of induction of generalized  endotracheal anesthesia, he received preoperative antibiotics. He was  then flipped into prone position on a Kartik table. All pressure  points were padded. His thoracic region was prepped and draped in the  usual sterile fashion. After this was done, #10 blade was used to make  a skin incision. Monopolar cautery was used to dissect through the  subcutaneous tissue. I placed self-retaining Weitlaner retractor into  the wound. Next, I opened up the thoracic fascia sharply with monopolar  cautery and exposed the spinous process of T12. I used intraoperative  fluoroscopy to confirm I was at the appropriate levels. I then  proceeded to perform a subperiosteal dissection to expose the bilateral  laminae at T12. After this was done, I placed a self-retaining angled  cerebellar retractor into the wound, and after this was completed, I  used a Leksell rongeur to bite up the spinous process of T12. I used a  high-speed bur to thin out the lamina at T12 bilaterally. I then used a  #4 Kerrison punch to start my central decompression. I performed a  bilateral T12 laminectomy. Once the laminectomy was completed, I then  proceeded to use a Penfield #4 to retract the thecal sac gently. Once  this was done, I did encounter a ventral epidural mass that was tan and  firm.   I then proceeded to use a pituitary rongeur to then resect the ventral epidural mass. It was sent off to Pathology for both frozen  section and permanent section and also for Gram stain and culture. Once  I decompressed the thecal sac ventrally, I then proceeded to inspect the  wound for any evidence of bleeding. Adequate hemostasis was obtained  with both monopolar and bipolar cautery. I irrigated the wound  copiously with antibiotic impregnated saline. I proceeded to then close  the wound in layers using 0 Vicryl for the fascia, 2-0 Vicryl for the  subcutaneous layer, and staples for the skin. Next, I then proceeded to  identify the entry point to the T12 pedicle on the patient's left side. I infiltrated the skin with lidocaine with an epinephrine 1:200,000. I  used a 15-blade to make a stab incision. I docked the [place]ada One-Step  Osteo introducer on the facet, advanced it through the pedicle into the  vertebral body. I was able to get across midline. I removed the inner  stylet, left the outer working cannula in place. I inserted a bone  biopsy tool. After obtaining a biopsy, I inserted Medtronic Kyphon  balloon that was inflated to 300 psi, deflated the balloon, injected a  total of 6 mL of polymethylmethacrylate into the vertebral body. After  this was done, I removed the outer working cannula. I obtained final AP  and lateral fluoroscopic images. No evidence of extravasation of  cement. The skin was closed with Dermabond. The patient was then  flipped into supine position on his hospital bed, was extubated, and  transported to the postanesthesia care unit in stable condition. There  were no complications. Counts were correct. I was present for the  entire case. Please note, Hannah Dumas, [facility], was the only qualified assistant. He  assisted with primary exposure, primary closure, and retraction of  neural elements.         Ricco Cifuentes MD    D: 04/30/2021 19:07:29       T: 04/30/2021 19:16:59     [email or web address]  Job#: [id number]     Doc#: [id number] CC:

## 2021-05-01 NOTE — PROGRESS NOTES
Physical Therapy  Physical Therapy Attempt    Name: Macy Jamison  : 1965  MRN: [id number]      Date of Service: 2021  Chart reviewed. Attempted initial evaluation twice today; however, pt declined due to pain and then prepping for CT scan. Explained importance of OOB activity. Will re-attempt as able.     Amy Medina, PT, DPT  [id number] no

## 2021-05-01 NOTE — CONSULTS
[address]                  [address] [facility],  [facility]                                  CONSULTATION    PATIENT NAME: Tari Junior                    :        1965  MED REC NO:   [id number]                            ROOM:       3506  ACCOUNT NO:   [de-identified]                           ADMIT DATE: 2021  PROVIDER:     Jaylin Arias MD    CONSULT DATE:  2021    LOCATION:  He is currently in room 5415-B. CHIEF COMPLAINT:  Kidney mass. PRESENT ILLNESS:  This 50-year-old male who has a history of having  kidney stones in the past has been having back pain and he also has  noticed gross hematuria about two weeks ago leading him to believe that  he may have had a stone. The patient came to the emergency room because  of his severe pain, underwent an MRI, was found to have a destructive  lesion of his thoracic vertebrae. The patient underwent surgery  yesterday for kyphoplasty. He also had a biopsy of this lesion which is  pending at this time. The patient did undergo a noncontrasted CT of his  abdomen which revealed that there was a large mass in the lower pole of  the right kidney which was somewhat solid, somewhat cystic. He also had  an adrenal lesion on the right side also. Currently, the patient is  lying flat in bed. He is comfortable to a degree. He is able to  urinate without any difficulty and his urine currently is grossly clear. PAST MEDICAL HISTORY:  Significant for stones, hypertension, diabetes,  cervical neck fusion, and right wrist surgery. ALLERGIES:  None to medication. MEDICATIONS:  Currently consist of Phenergan, Zofran, Dilaudid,  lisinopril, _____, Glucophage, Actos, Humalog, Ultram.    FAMILY HISTORY:  Cancer in his mother. SOCIAL HISTORY:  The patient is . He is a  who is  temporarily laid off. He does smoke cigarettes.     PHYSICAL EXAMINATION:  GENERAL:  The patient is a large male who is alert, oriented, appears  stable. ABDOMEN:  Obese. :  The bladder is not palpable. Penis, testes are normal.    IMPRESSION:  Presumed metastatic disease to his thoracic spine. There  is a lesion in the right kidney which is compatible with a cystic renal  tumor. The pathology that was obtained from his pathologic fracture  should connect the kidney to the lesion. If not, a biopsy of the kidney  can be done. We will follow to determine what the next course of action  is, pending the reports.         Michelle Cage MD    D: 05/01/2021 17:26:35       T: 05/01/2021 17:37:26     [email or web address]  Job#: [id number]     Doc#: [id number]    CC:

## 2021-05-01 NOTE — PROGRESS NOTES
Department of Neurosurgery  Progress Note    CHIEF COMPLAINT: s/p T12 kyphoplasty/laminectomy    SUBJECTIVE:  C/o op site pain    REVIEW OF SYSTEMS :  Constitutional: Negative for chills and fever. Neurological: Negative for dizziness, tremors and speech change.      OBJECTIVE:   VITALS:  BP (!) 144/70   Pulse 92   Temp 99.8 °F (37.7 °C) (Temporal)   Resp 20   Ht 6' (1.829 m)   Wt 278 lb (126.1 kg)   SpO2 93%   BMI 37.70 kg/m²   PHYSICAL:  CONSTITUTIONAL:  awake, alert, cooperative, no apparent distress, and appears stated age and FINE/FC    DATA:  CBC:   Lab Results   Component Value Date    WBC 9.6 04/28/2021    RBC 5.14 04/28/2021    HGB 13.6 04/28/2021    HCT 44.0 04/28/2021    MCV 85.6 04/28/2021    MCH 26.5 04/28/2021    MCHC 30.9 04/28/2021    RDW 13.0 04/28/2021     04/28/2021    MPV 9.8 04/28/2021     BMP:    Lab Results   Component Value Date     04/29/2021    K 4.4 04/29/2021    CL 99 04/29/2021    CO2 27 04/29/2021    BUN 14 04/29/2021    LABALBU 3.5 04/28/2021    CREATININE 0.6 04/29/2021    CALCIUM 9.2 04/29/2021    GFRAA >60 04/29/2021    LABGLOM >60 04/29/2021    GLUCOSE 161 04/29/2021     PT/INR:    Lab Results   Component Value Date    PROTIME 12.2 04/30/2021    INR 1.1 04/30/2021     PTT:    Lab Results   Component Value Date    APTT 33.8 04/30/2021   [APTT}    Current Inpatient Medications  Current Facility-Administered Medications: iohexol (OMNIPAQUE 240) injection 50 mL, 50 mL, Oral, ONCE PRN  diazePAM (VALIUM) tablet 5 mg, 5 mg, Oral, Q6H PRN  sodium chloride flush 0.9 % injection 5-40 mL, 5-40 mL, Intravenous, PRN  0.9 % sodium chloride infusion, 25 mL, Intravenous, PRN  promethazine (PHENERGAN) tablet 12.5 mg, 12.5 mg, Oral, Q6H PRN **OR** ondansetron (ZOFRAN) injection 4 mg, 4 mg, Intravenous, Q6H PRN  0.9 % sodium chloride infusion, , Intravenous, Continuous  ceFAZolin (ANCEF) 3,000 mg in dextrose 5 % 100 mL IVPB, 3,000 mg, Intravenous, Q8H  oxyCODONE (OXYCONTIN) extended release tablet 10 mg, 10 mg, Oral, 2 times per day  HYDROmorphone (DILAUDID) injection 1 mg, 1 mg, Intravenous, Q2H PRN **OR** HYDROmorphone (DILAUDID) injection 0.5 mg, 0.5 mg, Intravenous, Q2H PRN  sennosides-docusate sodium (SENOKOT-S) 8.6-50 MG tablet 1 tablet, 1 tablet, Oral, BID  polyethylene glycol (GLYCOLAX) packet 17 g, 17 g, Oral, BID  oxyCODONE (ROXICODONE) immediate release tablet 5 mg, 5 mg, Oral, Q4H PRN **OR** oxyCODONE HCl (OXY-IR) immediate release tablet 10 mg, 10 mg, Oral, Q4H PRN  gabapentin (NEURONTIN) capsule 300 mg, 300 mg, Oral, TID  nicotine (NICODERM CQ) 21 MG/24HR 1 patch, 1 patch, Transdermal, Daily  acetaminophen (TYLENOL) tablet 1,000 mg, 1,000 mg, Oral, TID  lamoTRIgine (LAMICTAL) tablet 100 mg, 100 mg, Oral, BID  sodium chloride flush 0.9 % injection 10 mL, 10 mL, Intravenous, 2 times per day  sodium chloride flush 0.9 % injection 10 mL, 10 mL, Intravenous, PRN  0.9 % sodium chloride infusion, 25 mL, Intravenous, PRN  potassium chloride (KLOR-CON M) extended release tablet 40 mEq, 40 mEq, Oral, PRN **OR** potassium bicarb-citric acid (EFFER-K) effervescent tablet 40 mEq, 40 mEq, Oral, PRN **OR** potassium chloride 10 mEq/100 mL IVPB (Peripheral Line), 10 mEq, Intravenous, PRN  magnesium hydroxide (MILK OF MAGNESIA) 400 MG/5ML suspension 30 mL, 30 mL, Oral, Daily PRN  glucose (GLUTOSE) 40 % oral gel 15 g, 15 g, Oral, PRN  dextrose 50 % IV solution, 12.5 g, Intravenous, PRN  glucagon (rDNA) injection 1 mg, 1 mg, Intramuscular, PRN  dextrose 5 % solution, 100 mL/hr, Intravenous, PRN  insulin lispro (HUMALOG) injection vial 0-6 Units, 0-6 Units, Subcutaneous, TID WC  insulin lispro (HUMALOG) injection vial 0-3 Units, 0-3 Units, Subcutaneous, Nightly  trimethobenzamide (TIGAN) injection 200 mg, 200 mg, Intramuscular, Q6H PRN  lisinopril (PRINIVIL;ZESTRIL) tablet 20 mg, 20 mg, Oral, Daily    ASSESSMENT:   · S/p T12 kyphoplasty and T12 laminectomy for tumor decompression    PLAN:  · Await pathology results  · WBAT with TLSO  · Pain control  · Oncology consulted      Electronically signed by BLESSING Macedo on 5/1/2021 at 9:13 AM

## 2021-05-01 NOTE — PROGRESS NOTES
appreciated. Abdomen: Positive bowel sounds to auscultation. Benign to palpation. No masses felt. No hepatosplenomegaly. Extremities: No clubbing, no cyanosis, no edema. Neurological: No focal motor or sensory loss however there is some weakness in lower extremities bilaterally  Lines: peripheral    Laboratory and Tests Review:  Lab Results   Component Value Date    WBC 9.6 04/28/2021    WBC 8.9 04/27/2021    WBC 10.0 04/26/2021    HGB 13.6 04/28/2021    HCT 44.0 04/28/2021    MCV 85.6 04/28/2021     04/28/2021     Lab Results   Component Value Date    NEUTROABS 6.53 04/28/2021    NEUTROABS 4.00 04/27/2021    NEUTROABS 5.12 04/26/2021     No results found for: [facility]  Lab Results   Component Value Date    ALT 13 04/28/2021    AST 10 04/28/2021    ALKPHOS 76 04/28/2021    BILITOT <0.2 04/28/2021     Lab Results   Component Value Date     04/29/2021    K 4.4 04/29/2021    CL 99 04/29/2021    CO2 27 04/29/2021    BUN 14 04/29/2021    CREATININE 0.6 04/29/2021    CREATININE 0.7 04/28/2021    CREATININE 0.7 04/27/2021    GFRAA >60 04/29/2021    LABGLOM >60 04/29/2021    GLUCOSE 161 04/29/2021    PROT 6.8 04/28/2021    LABALBU 3.5 04/28/2021    CALCIUM 9.2 04/29/2021    BILITOT <0.2 04/28/2021    ALKPHOS 76 04/28/2021    AST 10 04/28/2021    ALT 13 04/28/2021     Lab Results   Component Value Date    CRP 1.0 (H) 04/28/2021    CRP 3.0 (H) 04/26/2021     Lab Results   Component Value Date    SEDRATE 57 (H) 04/28/2021    SEDRATE 68 (H) 04/26/2021     Radiology:  MRI  Acute to subacute superior endplate compression fracture of T12 with 40%   height loss and 5 mm retropulsion into the spinal canal.  There is associated   extensive anterior epidural hematoma at this level which contributes to   severe canal stenosis with cord compression at the level of T12 and moderate   bilateral neural foraminal narrowing at T11-T12 and T12-L1. No evidence of spondylo discitis.      Microbiology:   Lab Results   Component Value Date    BC 5 Days no growth 04/25/2021     Lab Results   Component Value Date    BLOODCULT2 5 Days no growth 04/25/2021     No results found for: WNDABS  No results found for: RESPSMEAR  No results found for: MPNEUMO, CLAMYDCU, LABLEGI, AFBCX, FUNGSM, LABFUNG  No results found for: CULTRESP  No results found for: CXCATHTIP  No results found for: BFCS  Culture Surgical   Date Value Ref Range Status   04/30/2021 Growth not present, incubation continues  Preliminary     Urine Culture, Routine   Date Value Ref Range Status   04/25/2021 Growth not present  Final     No results found for: [facility]     Assessment:  · T11-T12 compression fracture with spinal stenosis-s/p laminectomy ( 4/30) cx neg to date        Plan:    · Watch off abx   · Follow cx, pathology   · Will follow with you       Electronically signed by Cheryl Douglas MD on 5/1/2021 at 11:05 AM

## 2021-05-01 NOTE — CONSULTS
[address]                  [address] [facility]  [facility]                                  CONSULTATION    PATIENT NAME: John Dave                    :        1965  MED REC NO:   [id number]                            ROOM:       2102  ACCOUNT NO:   [de-identified]                           ADMIT DATE: 2021  PROVIDER:     Marlena Rothman MD    CONSULT DATE:  2021    REASON FOR CONSULTATION:  T12 bone lesion. HISTORY OF PRESENT ILLNESS:  The patient is a pleasant 60-year-old  gentleman who started having back pain several months ago. He noticed  it in the lower back radiating down into both legs. He was seen by his  PCP for further evaluation. He was found at some point to have a  compression fracture. He states that at one point he had back  manipulation. He felt better for about 30 minutes and then, he started  developing severe burning and tingling down both legs into the thigh  area. Prior to admission, he was having severe pain in the lower back  radiating down both legs with significant pain. No loss of sensation. No loss of bowel or bladder incontinence. He had MRI on 2021 that revealed acute compression fracture,  superior endplate, W59 with 23% height loss; associated 5-mm  retropulsion, superior endplate, into the spinal canal; chronic superior  endplate compression fracture T8 with 25% height loss; indeterminate  18-mm lesion, right adrenal gland, at the T12 area with severe canal  stenosis with cord compression at the T12 vertebral body. The patient  proceeded to have surgery on 2021. Preoperative diagnosis is  acute T12 vertebral pathologic fracture with T12 epidural mass. He  underwent bilateral T12 laminectomy for resection of ventral epidural  mass. Today, he had CT scans of chest, abdomen, and pelvis to assess for  primary source of possible malignancy. He had lungs clear.   Liver with  fatty infiltration. There is a 3.1 x 2.4 cm right adrenal mass,  nonspecific adenopathy, compression fracture T8 and T12 noted with  vertebroplasty T12, left kidney normal except a 1.2-cm cyst.  There was  a large, complex cystic solid mass measuring 5.2 x 4.7 x 6.4 cm inferior  pole, right kidney; additional complex hypodense lesion in the right  kidney, largest measuring 2.8 x 2.6 cm, likely multifocal malignancy in  the right kidney. The patient is post kyphoplasty from yesterday. He  is having a significant amount of pain in the lower back and legs. Prior to admission, he also reports that he felt like he had kidney  stones. He was having some urinary frequency. Other than that, no  fevers or chills. His weight had been stable. Energy had been fair. No shortness of breath or cough. No heartburn. Bowels have been moving  okay. No loss of bowel or bladder control. No dysuria. PAST MEDICAL HISTORY:  Significant for nephrolithiasis, hypertension,  diabetes, cervical/neck fusion x2, right wrist surgery. ALLERGIES:  No known drug allergies. MEDICATIONS:  As per the flow sheet. FAMILY HISTORY:  Mother  of breast cancer at 79. Father is alive,  80; has hypertension, diabetes. SOCIAL HISTORY:  He is , no children. He does smoke tobacco, one  pack per day of tobacco.  No alcohol use. He is a . He has  been laid off in  from the [employer]. REVIEW OF SYSTEMS:  As per the HPI. Remaining 12-point review of  systems negative. PHYSICAL EXAMINATION:  VITAL SIGNS:  Temperature 99.8, pulse 92, respirations 20, blood  pressure 144/70. GENERAL:  Pleasant gentleman, alert and oriented x3 in no apparent  distress. HEENT:  PERRLA. Anicteric sclerae. Oropharynx is clear. NECK:  Supple without adenopathy. LUNGS:  Good air movement. No wheezing, rales, or rhonchi. HEART:  S1, S2.  Regular rate and rhythm. ABDOMEN:  Soft. Bowel sounds present. Obese.   EXTREMITIES: Lower extremities are warm. Sensation intact. SKIN:  No rash, jaundice, or lesions. NEUROLOGIC:  No focal neurologic deficits. PSYCHOLOGIC:  The patient in control. LABORATORY AND DIAGNOSTIC DATA:  MRI as dictated above. WBC 9.6,  hemoglobin 13.6, platelets 558. Sed rate 57. IMPRESSION:  A 60-year-old gentleman with pathologic T12 compression  fracture and a large right renal mass; another mass in the kidney,  possible adrenal gland lesion, likely a metastatic renal cell carcinoma. RECOMMENDATIONS:  Follow up on the pathology from the surgery he had of  the T12 debulking, likely metastatic deposit from kidney cancer. Arrange for Urology to see the patient. If he has multifocal disease in  the right kidney and oligometastatic disease, may be a candidate for  surgical resection of the kidney, partial nephrectomy, ablation,  localized options for the kidney. We will need to address whether or  not the adrenal lesion is malignant or more of a benign etiology. Would  like to do further imaging studies; however, the patient just had  surgery and is uncomfortable. This can be delayed for a few days. We  will send off urine cytology, consult Urology, and we will continue to  follow along with you. Thank you very much for allowing me to participate in his care.         ROSARIO WHITLEY MD    D: 05/01/2021 14:27:00       T: 05/01/2021 14:39:27     [email or web address]  Job#: [id number]     Doc#: [id number]    CC:

## 2021-05-02 LAB
METER GLUCOSE: 138 MG/DL (ref 74–99)
METER GLUCOSE: 144 MG/DL (ref 74–99)
METER GLUCOSE: 145 MG/DL (ref 74–99)
METER GLUCOSE: 158 MG/DL (ref 74–99)
METER GLUCOSE: 192 MG/DL (ref 74–99)

## 2021-05-02 PROCEDURE — 97535 SELF CARE MNGMENT TRAINING: CPT

## 2021-05-02 PROCEDURE — 97530 THERAPEUTIC ACTIVITIES: CPT

## 2021-05-02 PROCEDURE — 6360000002 HC RX W HCPCS: Performed by: NEUROLOGICAL SURGERY

## 2021-05-02 PROCEDURE — 6370000000 HC RX 637 (ALT 250 FOR IP): Performed by: PHYSICIAN ASSISTANT

## 2021-05-02 PROCEDURE — 97164 PT RE-EVAL EST PLAN CARE: CPT

## 2021-05-02 PROCEDURE — 6360000002 HC RX W HCPCS: Performed by: INTERNAL MEDICINE

## 2021-05-02 PROCEDURE — 6370000000 HC RX 637 (ALT 250 FOR IP): Performed by: NEUROLOGICAL SURGERY

## 2021-05-02 PROCEDURE — 82962 GLUCOSE BLOOD TEST: CPT

## 2021-05-02 PROCEDURE — 6370000000 HC RX 637 (ALT 250 FOR IP): Performed by: INTERNAL MEDICINE

## 2021-05-02 PROCEDURE — 2580000003 HC RX 258: Performed by: PHYSICIAN ASSISTANT

## 2021-05-02 PROCEDURE — 97166 OT EVAL MOD COMPLEX 45 MIN: CPT

## 2021-05-02 PROCEDURE — 1200000000 HC SEMI PRIVATE

## 2021-05-02 RX ORDER — GABAPENTIN 300 MG/1
600 CAPSULE ORAL 3 TIMES DAILY
Status: DISCONTINUED | OUTPATIENT
Start: 2021-05-02 | End: 2021-05-05

## 2021-05-02 RX ORDER — BISACODYL 10 MG
10 SUPPOSITORY, RECTAL RECTAL DAILY PRN
Status: DISCONTINUED | OUTPATIENT
Start: 2021-05-02 | End: 2021-05-14 | Stop reason: HOSPADM

## 2021-05-02 RX ORDER — NAPROXEN 250 MG/1
375 TABLET ORAL 2 TIMES DAILY WITH MEALS
Status: DISCONTINUED | OUTPATIENT
Start: 2021-05-02 | End: 2021-05-03

## 2021-05-02 RX ADMIN — ACETAMINOPHEN 1000 MG: 500 TABLET, FILM COATED ORAL at 08:51

## 2021-05-02 RX ADMIN — GABAPENTIN 600 MG: 300 CAPSULE ORAL at 20:10

## 2021-05-02 RX ADMIN — HYDROMORPHONE HYDROCHLORIDE 1 MG: 1 INJECTION, SOLUTION INTRAMUSCULAR; INTRAVENOUS; SUBCUTANEOUS at 10:12

## 2021-05-02 RX ADMIN — HYDROMORPHONE HYDROCHLORIDE 1 MG: 1 INJECTION, SOLUTION INTRAMUSCULAR; INTRAVENOUS; SUBCUTANEOUS at 03:04

## 2021-05-02 RX ADMIN — HYDROMORPHONE HYDROCHLORIDE 1 MG: 1 INJECTION, SOLUTION INTRAMUSCULAR; INTRAVENOUS; SUBCUTANEOUS at 16:54

## 2021-05-02 RX ADMIN — DIAZEPAM 5 MG: 5 TABLET ORAL at 15:43

## 2021-05-02 RX ADMIN — OXYCODONE HYDROCHLORIDE 10 MG: 10 TABLET ORAL at 21:45

## 2021-05-02 RX ADMIN — OXYCODONE HYDROCHLORIDE 10 MG: 10 TABLET, FILM COATED, EXTENDED RELEASE ORAL at 20:09

## 2021-05-02 RX ADMIN — INSULIN LISPRO 1 UNITS: 100 INJECTION, SOLUTION INTRAVENOUS; SUBCUTANEOUS at 16:56

## 2021-05-02 RX ADMIN — DIAZEPAM 5 MG: 5 TABLET ORAL at 08:52

## 2021-05-02 RX ADMIN — LAMOTRIGINE 100 MG: 100 TABLET ORAL at 20:10

## 2021-05-02 RX ADMIN — ACETAMINOPHEN 1000 MG: 500 TABLET, FILM COATED ORAL at 13:28

## 2021-05-02 RX ADMIN — LISINOPRIL 20 MG: 20 TABLET ORAL at 08:51

## 2021-05-02 RX ADMIN — HYDROMORPHONE HYDROCHLORIDE 1 MG: 1 INJECTION, SOLUTION INTRAMUSCULAR; INTRAVENOUS; SUBCUTANEOUS at 20:09

## 2021-05-02 RX ADMIN — GABAPENTIN 300 MG: 300 CAPSULE ORAL at 13:28

## 2021-05-02 RX ADMIN — LAMOTRIGINE 100 MG: 100 TABLET ORAL at 08:51

## 2021-05-02 RX ADMIN — SENNOSIDES AND DOCUSATE SODIUM 1 TABLET: 8.6; 5 TABLET ORAL at 20:10

## 2021-05-02 RX ADMIN — OXYCODONE HYDROCHLORIDE 10 MG: 10 TABLET ORAL at 11:28

## 2021-05-02 RX ADMIN — HYDROMORPHONE HYDROCHLORIDE 1 MG: 1 INJECTION, SOLUTION INTRAMUSCULAR; INTRAVENOUS; SUBCUTANEOUS at 05:27

## 2021-05-02 RX ADMIN — OXYCODONE HYDROCHLORIDE 10 MG: 10 TABLET ORAL at 06:18

## 2021-05-02 RX ADMIN — HYDROMORPHONE HYDROCHLORIDE 1 MG: 1 INJECTION, SOLUTION INTRAMUSCULAR; INTRAVENOUS; SUBCUTANEOUS at 07:56

## 2021-05-02 RX ADMIN — OXYCODONE HYDROCHLORIDE 10 MG: 10 TABLET, FILM COATED, EXTENDED RELEASE ORAL at 08:52

## 2021-05-02 RX ADMIN — INSULIN LISPRO 1 UNITS: 100 INJECTION, SOLUTION INTRAVENOUS; SUBCUTANEOUS at 11:28

## 2021-05-02 RX ADMIN — HYDROMORPHONE HYDROCHLORIDE 1 MG: 1 INJECTION, SOLUTION INTRAMUSCULAR; INTRAVENOUS; SUBCUTANEOUS at 00:56

## 2021-05-02 RX ADMIN — GABAPENTIN 300 MG: 300 CAPSULE ORAL at 08:52

## 2021-05-02 RX ADMIN — OXYCODONE HYDROCHLORIDE 10 MG: 10 TABLET ORAL at 02:31

## 2021-05-02 RX ADMIN — HYDROMORPHONE HYDROCHLORIDE 1 MG: 1 INJECTION, SOLUTION INTRAMUSCULAR; INTRAVENOUS; SUBCUTANEOUS at 12:40

## 2021-05-02 RX ADMIN — OXYCODONE HYDROCHLORIDE 10 MG: 10 TABLET ORAL at 15:43

## 2021-05-02 RX ADMIN — ACETAMINOPHEN 1000 MG: 500 TABLET, FILM COATED ORAL at 20:09

## 2021-05-02 RX ADMIN — SODIUM CHLORIDE: 9 INJECTION, SOLUTION INTRAVENOUS at 10:11

## 2021-05-02 RX ADMIN — HYDROMORPHONE HYDROCHLORIDE 1 MG: 1 INJECTION, SOLUTION INTRAMUSCULAR; INTRAVENOUS; SUBCUTANEOUS at 14:41

## 2021-05-02 RX ADMIN — DIAZEPAM 5 MG: 5 TABLET ORAL at 02:31

## 2021-05-02 ASSESSMENT — PAIN DESCRIPTION - DESCRIPTORS
DESCRIPTORS: ACHING;CONSTANT;DISCOMFORT
DESCRIPTORS: ACHING;CONSTANT;DISCOMFORT
DESCRIPTORS: ACHING;DISCOMFORT;SORE
DESCRIPTORS: ACHING;DULL;DISCOMFORT
DESCRIPTORS: ACHING;DISCOMFORT;SORE
DESCRIPTORS: ACHING;CONSTANT;DISCOMFORT
DESCRIPTORS: ACHING;CONSTANT;DISCOMFORT
DESCRIPTORS: ACHING;DULL;DISCOMFORT
DESCRIPTORS: ACHING;DULL;DISCOMFORT
DESCRIPTORS: ACHING;DISCOMFORT;SHARP
DESCRIPTORS: ACHING;DISCOMFORT;SORE
DESCRIPTORS: ACHING;CONSTANT;DISCOMFORT

## 2021-05-02 ASSESSMENT — PAIN DESCRIPTION - ONSET
ONSET: ON-GOING

## 2021-05-02 ASSESSMENT — PAIN SCALES - GENERAL
PAINLEVEL_OUTOF10: 0
PAINLEVEL_OUTOF10: 5
PAINLEVEL_OUTOF10: 9
PAINLEVEL_OUTOF10: 0
PAINLEVEL_OUTOF10: 10
PAINLEVEL_OUTOF10: 8
PAINLEVEL_OUTOF10: 8
PAINLEVEL_OUTOF10: 9
PAINLEVEL_OUTOF10: 6
PAINLEVEL_OUTOF10: 0
PAINLEVEL_OUTOF10: 5
PAINLEVEL_OUTOF10: 10
PAINLEVEL_OUTOF10: 9
PAINLEVEL_OUTOF10: 7
PAINLEVEL_OUTOF10: 7
PAINLEVEL_OUTOF10: 0
PAINLEVEL_OUTOF10: 7
PAINLEVEL_OUTOF10: 8
PAINLEVEL_OUTOF10: 10
PAINLEVEL_OUTOF10: 7

## 2021-05-02 ASSESSMENT — PAIN DESCRIPTION - FREQUENCY
FREQUENCY: CONTINUOUS

## 2021-05-02 ASSESSMENT — PAIN DESCRIPTION - PAIN TYPE
TYPE: SURGICAL PAIN

## 2021-05-02 ASSESSMENT — PAIN DESCRIPTION - LOCATION
LOCATION: BACK

## 2021-05-02 ASSESSMENT — PAIN DESCRIPTION - ORIENTATION
ORIENTATION: LOWER

## 2021-05-02 NOTE — PROGRESS NOTES
Occupational Therapy  OCCUPATIONAL THERAPY RE EVALUATION      Date:2021  Patient Name: Cheo Hardy  MRN: [id number]  : 1965  Room: [address] [place] [address]      Evaluating OT: SAMUEL Her/L   Referring provider: BLESSING Rodriguez    AM-PAC Daily Activity Raw Score:   Recommended Adaptive Equipment: continue to assess    Diagnosis: Thoracic Back Pain   Surgery: S/p T12 kyphoplasty and T12 laminectomy for tumor decompression on     Pertinent Medical History: Arthritis, DM, CTS  Additional information: pt being re-evaluated s/p surgery     Precautions:  Falls, Soft TLSO,HOB < 45*, Spinal precautions      Home Living: Pt lives with wife in a 1 story home with no steps to enter and no HR, ramp to enter. .  Bathroom setup: tub/shower, std. Commodes, walk in shower in basement   Equipment owned: none     Prior Level of Function: Ind. with ADLs , Ind. with IADLs; ambulated no A.D. Driving: active  Occupation:     Pain Level: pt c/o severe pain in back at incision site and with movement; RN aware  Cognition: A&O: 3; Follows 1-2 step directions   Memory: G   Sequencing: F   Problem solving: F   Judgement/safety: F-worse as pain gets worse     Functional Assessment:   Initial Eval Status  Date:  Treatment Status  Date: Short Term Goals  Treatment frequency: 1-3x/week on PRN    Feeding Setup A     Grooming/Hygiene Mod I bed level   Mod I seated in chair      UB Dressing Mod A   Mod I seated      LB Dressing DEP   Mod A    Bathing Max A     Toileting DEP   Mod A   Bed Mobility  Supine to sit: Max Ax2  Sit to supine: Max Ax2     Functional Transfers Sit to stand: Max Ax2  Stand to sit: Max Ax2  Assist of 3rd person d/t BLE buckling   Mod A   Functional Mobility NT   Mod A    Balance Sitting:      Static: Max A    Dynamic:Max A  Standing:  Max Ax2     Endurance/Activity Tolerance F-; limited by pain and weakness    F+     Visual/  Perceptual Glasses: No; WFL        UE strengthening    See UE contractures. ? Education: Pt educated on precautions and implications on ADL functioning prior to mobility, OT POC, OT role, Importance of completing ADL tasks daily as IND as possible to aide in recovery process, fall risk precautions, being OOB to chair for all meals. Assessment of current deficits:   Functional mobility [x]  ADLs [x] Strength []  Cognition []  Functional transfers  [x] IADLs [x] Safety Awareness []  Endurance [x]  Fine Motor Coordination [] Balance [] Vision/perception [] Sensation []   Gross Motor Coordination [] ROM [] Delirium []                  Motor Control []     Plan of Care: 1-3 days/week for 1-2 weeks PRN   [x]ADL retraining/adapted techniques and AE recommendations to increase functional independence within precautions                    [x]Energy conservation techniques to improve tolerance for selfcare routine   [x]Functional transfer/mobility training/DME recommendations for increased independence, safety and fall prevention         [x]Patient/family education to increase safety and functional independence             [x]Environmental modifications for safe mobility and completion of ADLs                             []Cognitive retraining ex's to improve problem solving skills & safe participation in ADLs/IADLs     []Sensory re-education techniques to improve extremity awareness, maintain skin integrity and improve hand function                             []Visual/Perceptual retraining  to improve body awareness and safety during transfers and ADLs  [x]Splinting/positioning needs to maintain joint/skin integrity and prevent contractures  [x]Therapeutic activity to improve functional performance during ADLs. [x]Therapeutic exercise to improve tolerance and functional strength for ADLs   [x]Balance retraining/tolerance tasks for facilitation of postural control with dynamic challenges during ADLs .   []Neuromuscular re-education: facilitation of righting/equilibrium reactions, midline orientation, scapular stability/mobility, Normalization muscle     tone and facilitation active functional movement/Attention                         [x]Delirium prevention/treatment    []Positioning to improve functional independence  []Other:       Rehab Potential:  Good for established goals     Patient / Family Goal: to decrease pain       Patient and/or family were instructed on functional diagnosis, prognosis/goals and OT plan of care. Demonstrated F understanding. Time In:0840  Time Out: 0910  Total Treatment Time:25    Min Units   OT Eval Low 30225       OT Eval Medium 84715  5    OT Eval High 91254       OT Re-Eval [id number]       Therapeutic Ex 35361       Therapeutic Activities 92709  10     ADL/Self Care 81183  15     Orthotic Management 24801       Neuro Re-Ed 90880       Non-Billable Time          Evaluation Time includes thorough review of current medical information, gathering information on past medical history/social history and prior level of function, completion of standardized testing/informal observation of tasks, assessment of data and education on plan of care and goals.       Andreas Yadav, OTR/L   [id number]

## 2021-05-02 NOTE — PROGRESS NOTES
session. Pt initiated movement with extremities, but required assistance for rolling to don TLSO brace. Pt reported significant pain with bed mobility. Pt educated on log roll technique for supine to sit transfer - pt able to partially roll, but required assistance to reach EOB. In sitting at EOB, pt demonstrated significant posterior lean and difficulty maintaining midline posture. Pt required significant assistance to stand, pt unable to maintain B knee extension - knee block required. Pt limited by deconditioning and pain. Pt was left in bed with all needs met and call light in reach. RN notified of assessment. Treatment:  Patient practiced and was instructed in the following treatment:     Bed mobility training - pt given verbal and tactile cues to facilitate proper sequencing and safety during rolling and supine>sit as well as provided with physical assistance to complete task    Sitting EOB for >5 minutes for upright tolerance, postural awareness and BLE ROM   Transfer training - pt was given verbal and tactile cues to facilitate proper hand placement, technique and safety during sit to stand and stand to sit as well as provided with physical assistance to complete task. Pt's/ family goals   1. Return home    Patient and or family understand(s) diagnosis, prognosis, and plan of care. yes    PLAN OF CARE:    Current Treatment Recommendations     [x] Strengthening     [] ROM   [x] Balance Training   [x] Endurance Training   [x] Transfer Training   [x] Gait Training   [x] Stair Training   [] Positioning   [x] Safety and Education Training   [x] Patient/Caregiver Education   [] HEP  [] Other     Frequency of treatments: 2-5x/week x 1-2 weeks.     Time in  0840  Time out  0910    Total Treatment Time  25 minutes     Evaluation Time includes thorough review of current medical information, gathering information on past medical history/social history and prior level of function, completion of standardized testing/informal observation of tasks, assessment of data and education on plan of care and goals.     CPT codes:  [] Low Complexity PT evaluation 32578  [] Moderate Complexity PT evaluation 47792  [] High Complexity PT evaluation 41697  [x] PT Re-evaluation 80797  [] Gait training 81233 - minutes  [] Manual therapy 56199 - minutes  [x] Therapeutic activities 42818 25 minutes  [] Therapeutic exercises 51401 - minutes  [] Neuromuscular reeducation 66406 - minutes     [address] [facility], [place], [place]  [id number]

## 2021-05-02 NOTE — PROGRESS NOTES
Department of Neurosurgery  Progress Note    CHIEF COMPLAINT: s/p T12 kyphoplasty/laminectomy    SUBJECTIVE:  C/o op site pain. No BM    REVIEW OF SYSTEMS :  Constitutional: Negative for chills and fever. Neurological: Negative for dizziness, tremors and speech change.      OBJECTIVE:   VITALS:  BP (!) 181/83   Pulse 97   Temp 98.1 °F (36.7 °C) (Temporal)   Resp 18   Ht 6' (1.829 m)   Wt 282 lb 13.6 oz (128.3 kg)   SpO2 93%   BMI 38.36 kg/m²   PHYSICAL:  CONSTITUTIONAL:  awake, alert, cooperative, no apparent distress, and appears stated age and FINE/FC    DATA:  CBC:   Lab Results   Component Value Date    WBC 9.6 04/28/2021    RBC 5.14 04/28/2021    HGB 13.6 04/28/2021    HCT 44.0 04/28/2021    MCV 85.6 04/28/2021    MCH 26.5 04/28/2021    MCHC 30.9 04/28/2021    RDW 13.0 04/28/2021     04/28/2021    MPV 9.8 04/28/2021     BMP:    Lab Results   Component Value Date     04/29/2021    K 4.4 04/29/2021    CL 99 04/29/2021    CO2 27 04/29/2021    BUN 14 04/29/2021    LABALBU 3.5 04/28/2021    CREATININE 0.6 04/29/2021    CALCIUM 9.2 04/29/2021    GFRAA >60 04/29/2021    LABGLOM >60 04/29/2021    GLUCOSE 161 04/29/2021     PT/INR:    Lab Results   Component Value Date    PROTIME 12.2 04/30/2021    INR 1.1 04/30/2021     PTT:    Lab Results   Component Value Date    APTT 33.8 04/30/2021   [APTT}    Current Inpatient Medications  Current Facility-Administered Medications: bisacodyl (DULCOLAX) suppository 10 mg, 10 mg, Rectal, Daily PRN  sodium chloride flush 0.9 % injection 5-40 mL, 5-40 mL, Intravenous, 2 times per day  diazePAM (VALIUM) tablet 5 mg, 5 mg, Oral, Q6H PRN  sodium chloride flush 0.9 % injection 5-40 mL, 5-40 mL, Intravenous, PRN  0.9 % sodium chloride infusion, 25 mL, Intravenous, PRN  promethazine (PHENERGAN) tablet 12.5 mg, 12.5 mg, Oral, Q6H PRN **OR** ondansetron (ZOFRAN) injection 4 mg, 4 mg, Intravenous, Q6H PRN  0.9 % sodium chloride infusion, , Intravenous, for tumor decompression on 4/30    PLAN:  · Await pathology results  · WBAT with TLSO  · Pain control  · suppository  · Oncology consulted      Electronically signed by BLESSING Collins on 5/2/2021 at 9:24 AM

## 2021-05-02 NOTE — PROGRESS NOTES
[address] [place] [address] [facility]  Progress Note      C/C : R/O discitis    SUBJECTIVE:    Denies fever and chills  Reports pain all over the body   No nausea, vomiting, diarrhea. .     Review of systems:  As stated above in the chief complaint, otherwise negative. Medications:  Scheduled Meds:   sodium chloride flush  5-40 mL Intravenous 2 times per day    oxyCODONE  10 mg Oral 2 times per day    sennosides-docusate sodium  1 tablet Oral BID    polyethylene glycol  17 g Oral BID    gabapentin  300 mg Oral TID    nicotine  1 patch Transdermal Daily    acetaminophen  1,000 mg Oral TID    lamoTRIgine  100 mg Oral BID    sodium chloride flush  10 mL Intravenous 2 times per day    insulin lispro  0-6 Units Subcutaneous TID WC    insulin lispro  0-3 Units Subcutaneous Nightly    lisinopril  20 mg Oral Daily     Continuous Infusions:   sodium chloride      sodium chloride 100 mL/hr at 21 1442    sodium chloride      dextrose       PRN Meds:bisacodyl, diazePAM, sodium chloride flush, sodium chloride, promethazine **OR** ondansetron, HYDROmorphone **OR** HYDROmorphone, oxyCODONE **OR** oxyCODONE, sodium chloride flush, sodium chloride, potassium chloride **OR** potassium alternative oral replacement **OR** potassium chloride, magnesium hydroxide, glucose, dextrose, glucagon (rDNA), dextrose, trimethobenzamide    OBJECTIVE:  BP (!) 181/83   Pulse 97   Temp 98.1 °F (36.7 °C) (Temporal)   Resp 18   Ht 6' (1.829 m)   Wt 282 lb 13.6 oz (128.3 kg)   SpO2 93%   BMI 38.36 kg/m²   Temp  Av.6 °F (37 °C)  Min: 98.1 °F (36.7 °C)  Max: 99.2 °F (37.3 °C)     Constitutional: The patient is awake, alert, and oriented. Skin: Warm and dry. No rashes were noted. HEENT: Round and reactive pupils. Moist mucous membranes. No ulcerations or thrush. Neck: Supple to movements. Chest: Clear , no wheeze or crackles   Cardiovascular: S1 and S2 are rhythmic and regular.  No murmurs appreciated. Abdomen: Positive bowel sounds to auscultation. Benign to palpation. No masses felt. No hepatosplenomegaly. Extremities: No clubbing, no cyanosis, no edema. Neurological: No focal motor or sensory loss however there is some weakness in lower extremities bilaterally  Lines: peripheral    Laboratory and Tests Review:  Lab Results   Component Value Date    WBC 9.6 04/28/2021    WBC 8.9 04/27/2021    WBC 10.0 04/26/2021    HGB 13.6 04/28/2021    HCT 44.0 04/28/2021    MCV 85.6 04/28/2021     04/28/2021     Lab Results   Component Value Date    NEUTROABS 6.53 04/28/2021    NEUTROABS 4.00 04/27/2021    NEUTROABS 5.12 04/26/2021     No results found for: [facility]  Lab Results   Component Value Date    ALT 13 04/28/2021    AST 10 04/28/2021    ALKPHOS 76 04/28/2021    BILITOT <0.2 04/28/2021     Lab Results   Component Value Date     04/29/2021    K 4.4 04/29/2021    CL 99 04/29/2021    CO2 27 04/29/2021    BUN 14 04/29/2021    CREATININE 0.6 04/29/2021    CREATININE 0.7 04/28/2021    CREATININE 0.7 04/27/2021    GFRAA >60 04/29/2021    LABGLOM >60 04/29/2021    GLUCOSE 161 04/29/2021    PROT 6.8 04/28/2021    LABALBU 3.5 04/28/2021    CALCIUM 9.2 04/29/2021    BILITOT <0.2 04/28/2021    ALKPHOS 76 04/28/2021    AST 10 04/28/2021    ALT 13 04/28/2021     Lab Results   Component Value Date    CRP 1.0 (H) 04/28/2021    CRP 3.0 (H) 04/26/2021     Lab Results   Component Value Date    SEDRATE 57 (H) 04/28/2021    SEDRATE 68 (H) 04/26/2021     Radiology:  MRI  Acute to subacute superior endplate compression fracture of T12 with 40%   height loss and 5 mm retropulsion into the spinal canal.  There is associated   extensive anterior epidural hematoma at this level which contributes to   severe canal stenosis with cord compression at the level of T12 and moderate   bilateral neural foraminal narrowing at T11-T12 and T12-L1.      CT abdomen and pelvis -  Impression     Heterogeneous appearance of the right kidney with complex mass in the i   inferior pole concerning for necrotic malignancy.  Additional hypodense   lesions are also identified.  Differential consideration will also include   multifocal pyelonephritis with abscess and or malignancy.        Microbiology:   Lab Results   Component Value Date    BC 5 Days no growth 04/25/2021     Lab Results   Component Value Date    BLOODCULT2 5 Days no growth 04/25/2021     No results found for: WNDABS  No results found for: RESPSMEAR  No results found for: Kailee Peers, LABLEGI, AFBCX, FUNGSM, LABFUNG  No results found for: CULTRESP  No results found for: CXCATHTIP  No results found for: BFCS  Culture Surgical   Date Value Ref Range Status   04/30/2021 Growth not present, incubation continues  Preliminary     Urine Culture, Routine   Date Value Ref Range Status   04/25/2021 Growth not present  Final     No results found for: [address]      Assessment:    · T11-T12 compression fracture with spinal stenosis-s/p laminectomy ( 4/30) cx neg to date   · Kidney cancer ( likely )        Plan:    · Watch off abx   · Follow cx, pathology   · Appreciated urology consult        Electronically signed by Suzy Jones MD on 5/2/2021 at 10:04 AM

## 2021-05-02 NOTE — PROGRESS NOTES
Chief Complaint:  Chief Complaint   Patient presents with    Back Pain     middle back; \"feels like a knot\"    Leg Pain     both thighs     Burst fracture of twelfth thoracic vertebra (HCC)     Subjective:    Pain is reasonably well controlled. No weakness or numbness    Objective:    BP (!) 148/78   Pulse 97   Temp 97.3 °F (36.3 °C) (Temporal)   Resp 19   Ht 6' (1.829 m)   Wt 282 lb 13.6 oz (128.3 kg)   SpO2 97%   BMI 38.36 kg/m²     Current medications that patient is taking have been reviewed. General appearance: NAD, conversant  HEENT: AT/NC, MMM  Neck: FROM, supple  Lungs: Clear to auscultation, WOB normal  CV: RRR, no MRGs  Abdomen: Soft, non-tender; no masses or HSM, +BS  Extremities: No peripheral edema or digital cyanosis  Skin: He has some \"skin tags\" that upon further inspection seem unusually large with broader bases  Psych: Calm, cooperative  Neuro: Alert and interactive, face symmetric, moving all extremities, speech fluent. Strength 5/5 in B/L LE's.     Labs:  CBC with Differential:    Lab Results   Component Value Date    WBC 9.6 04/28/2021    RBC 5.14 04/28/2021    HGB 13.6 04/28/2021    HCT 44.0 04/28/2021     04/28/2021    MCV 85.6 04/28/2021    MCH 26.5 04/28/2021    MCHC 30.9 04/28/2021    RDW 13.0 04/28/2021    LYMPHOPCT 20.9 04/28/2021    MONOPCT 6.3 04/28/2021    BASOPCT 0.8 04/28/2021    MONOSABS 0.61 04/28/2021    LYMPHSABS 2.01 04/28/2021    EOSABS 0.35 04/28/2021    BASOSABS 0.08 04/28/2021     CMP:    Lab Results   Component Value Date     04/29/2021    K 4.4 04/29/2021    CL 99 04/29/2021    CO2 27 04/29/2021    BUN 14 04/29/2021    CREATININE 0.6 04/29/2021    GFRAA >60 04/29/2021    LABGLOM >60 04/29/2021    GLUCOSE 161 04/29/2021    PROT 6.8 04/28/2021    LABALBU 3.5 04/28/2021    CALCIUM 9.2 04/29/2021    BILITOT <0.2 04/28/2021    ALKPHOS 76 04/28/2021    AST 10 04/28/2021    ALT 13 04/28/2021        Assessment/Plan:  Principal Problem:    Burst fracture

## 2021-05-03 LAB
ANION GAP SERPL CALCULATED.3IONS-SCNC: 11 MMOL/L (ref 7–16)
BASOPHILS ABSOLUTE: 0.07 E9/L (ref 0–0.2)
BASOPHILS RELATIVE PERCENT: 0.6 % (ref 0–2)
BUN BLDV-MCNC: 10 MG/DL (ref 6–20)
CALCIUM SERPL-MCNC: 8.9 MG/DL (ref 8.6–10.2)
CHLORIDE BLD-SCNC: 97 MMOL/L (ref 98–107)
CO2: 26 MMOL/L (ref 22–29)
CREAT SERPL-MCNC: 0.5 MG/DL (ref 0.7–1.2)
CULTURE SURGICAL: NORMAL
EOSINOPHILS ABSOLUTE: 0.32 E9/L (ref 0.05–0.5)
EOSINOPHILS RELATIVE PERCENT: 2.9 % (ref 0–6)
GFR AFRICAN AMERICAN: >60
GFR NON-AFRICAN AMERICAN: >60 ML/MIN/1.73
GLUCOSE BLD-MCNC: 128 MG/DL (ref 74–99)
HCT VFR BLD CALC: 42 % (ref 37–54)
HEMOGLOBIN: 13.5 G/DL (ref 12.5–16.5)
IMMATURE GRANULOCYTES #: 0.04 E9/L
IMMATURE GRANULOCYTES %: 0.4 % (ref 0–5)
LYMPHOCYTES ABSOLUTE: 2.24 E9/L (ref 1.5–4)
LYMPHOCYTES RELATIVE PERCENT: 20.1 % (ref 20–42)
MCH RBC QN AUTO: 27.5 PG (ref 26–35)
MCHC RBC AUTO-ENTMCNC: 32.1 % (ref 32–34.5)
MCV RBC AUTO: 85.5 FL (ref 80–99.9)
METER GLUCOSE: 139 MG/DL (ref 74–99)
METER GLUCOSE: 142 MG/DL (ref 74–99)
METER GLUCOSE: 156 MG/DL (ref 74–99)
METER GLUCOSE: 159 MG/DL (ref 74–99)
MONOCYTES ABSOLUTE: 1.06 E9/L (ref 0.1–0.95)
MONOCYTES RELATIVE PERCENT: 9.5 % (ref 2–12)
NEUTROPHILS ABSOLUTE: 7.43 E9/L (ref 1.8–7.3)
NEUTROPHILS RELATIVE PERCENT: 66.5 % (ref 43–80)
PDW BLD-RTO: 12.7 FL (ref 11.5–15)
PLATELET # BLD: 283 E9/L (ref 130–450)
PMV BLD AUTO: 10 FL (ref 7–12)
POTASSIUM SERPL-SCNC: 4 MMOL/L (ref 3.5–5)
RBC # BLD: 4.91 E12/L (ref 3.8–5.8)
SODIUM BLD-SCNC: 134 MMOL/L (ref 132–146)
WBC # BLD: 11.2 E9/L (ref 4.5–11.5)

## 2021-05-03 PROCEDURE — 36415 COLL VENOUS BLD VENIPUNCTURE: CPT

## 2021-05-03 PROCEDURE — 80048 BASIC METABOLIC PNL TOTAL CA: CPT

## 2021-05-03 PROCEDURE — 2580000003 HC RX 258: Performed by: PHYSICIAN ASSISTANT

## 2021-05-03 PROCEDURE — 6370000000 HC RX 637 (ALT 250 FOR IP): Performed by: PHYSICIAN ASSISTANT

## 2021-05-03 PROCEDURE — 1200000000 HC SEMI PRIVATE

## 2021-05-03 PROCEDURE — 99255 IP/OBS CONSLTJ NEW/EST HI 80: CPT | Performed by: PAIN MEDICINE

## 2021-05-03 PROCEDURE — 82962 GLUCOSE BLOOD TEST: CPT

## 2021-05-03 PROCEDURE — 6370000000 HC RX 637 (ALT 250 FOR IP): Performed by: INTERNAL MEDICINE

## 2021-05-03 PROCEDURE — 85025 COMPLETE CBC W/AUTO DIFF WBC: CPT

## 2021-05-03 PROCEDURE — 6360000002 HC RX W HCPCS: Performed by: INTERNAL MEDICINE

## 2021-05-03 PROCEDURE — L0464 TLSO 4MOD SACRO-SCAP PRE: HCPCS

## 2021-05-03 PROCEDURE — 6370000000 HC RX 637 (ALT 250 FOR IP): Performed by: NEUROLOGICAL SURGERY

## 2021-05-03 RX ADMIN — POLYETHYLENE GLYCOL 3350 17 G: 17 POWDER, FOR SOLUTION ORAL at 09:39

## 2021-05-03 RX ADMIN — OXYCODONE HYDROCHLORIDE 10 MG: 10 TABLET, FILM COATED, EXTENDED RELEASE ORAL at 21:36

## 2021-05-03 RX ADMIN — GABAPENTIN 600 MG: 300 CAPSULE ORAL at 09:39

## 2021-05-03 RX ADMIN — OXYCODONE HYDROCHLORIDE 10 MG: 10 TABLET ORAL at 06:14

## 2021-05-03 RX ADMIN — LAMOTRIGINE 100 MG: 100 TABLET ORAL at 20:31

## 2021-05-03 RX ADMIN — Medication 10 ML: at 09:46

## 2021-05-03 RX ADMIN — GABAPENTIN 600 MG: 300 CAPSULE ORAL at 14:12

## 2021-05-03 RX ADMIN — ACETAMINOPHEN 1000 MG: 500 TABLET, FILM COATED ORAL at 09:13

## 2021-05-03 RX ADMIN — HYDROMORPHONE HYDROCHLORIDE 1 MG: 1 INJECTION, SOLUTION INTRAMUSCULAR; INTRAVENOUS; SUBCUTANEOUS at 09:46

## 2021-05-03 RX ADMIN — HYDROMORPHONE HYDROCHLORIDE 1 MG: 1 INJECTION, SOLUTION INTRAMUSCULAR; INTRAVENOUS; SUBCUTANEOUS at 04:52

## 2021-05-03 RX ADMIN — HYDROMORPHONE HYDROCHLORIDE 1 MG: 1 INJECTION, SOLUTION INTRAMUSCULAR; INTRAVENOUS; SUBCUTANEOUS at 14:13

## 2021-05-03 RX ADMIN — SENNOSIDES AND DOCUSATE SODIUM 1 TABLET: 8.6; 5 TABLET ORAL at 09:41

## 2021-05-03 RX ADMIN — LAMOTRIGINE 100 MG: 100 TABLET ORAL at 09:41

## 2021-05-03 RX ADMIN — LISINOPRIL 20 MG: 20 TABLET ORAL at 09:41

## 2021-05-03 RX ADMIN — Medication 10 ML: at 14:13

## 2021-05-03 RX ADMIN — OXYCODONE HYDROCHLORIDE 10 MG: 10 TABLET, FILM COATED, EXTENDED RELEASE ORAL at 09:39

## 2021-05-03 RX ADMIN — HYDROMORPHONE HYDROCHLORIDE 1 MG: 1 INJECTION, SOLUTION INTRAMUSCULAR; INTRAVENOUS; SUBCUTANEOUS at 18:28

## 2021-05-03 RX ADMIN — INSULIN LISPRO 3 UNITS: 100 INJECTION, SOLUTION INTRAVENOUS; SUBCUTANEOUS at 11:58

## 2021-05-03 RX ADMIN — GABAPENTIN 600 MG: 300 CAPSULE ORAL at 20:30

## 2021-05-03 RX ADMIN — DIAZEPAM 5 MG: 5 TABLET ORAL at 23:35

## 2021-05-03 RX ADMIN — ACETAMINOPHEN 1000 MG: 500 TABLET, FILM COATED ORAL at 20:29

## 2021-05-03 RX ADMIN — HYDROMORPHONE HYDROCHLORIDE 1 MG: 1 INJECTION, SOLUTION INTRAMUSCULAR; INTRAVENOUS; SUBCUTANEOUS at 00:07

## 2021-05-03 RX ADMIN — OXYCODONE HYDROCHLORIDE 10 MG: 10 TABLET ORAL at 20:30

## 2021-05-03 RX ADMIN — ACETAMINOPHEN 1000 MG: 500 TABLET, FILM COATED ORAL at 14:12

## 2021-05-03 RX ADMIN — SENNOSIDES AND DOCUSATE SODIUM 1 TABLET: 8.6; 5 TABLET ORAL at 20:30

## 2021-05-03 RX ADMIN — INSULIN LISPRO 1 UNITS: 100 INJECTION, SOLUTION INTRAVENOUS; SUBCUTANEOUS at 20:43

## 2021-05-03 RX ADMIN — DIAZEPAM 5 MG: 5 TABLET ORAL at 09:50

## 2021-05-03 RX ADMIN — OXYCODONE HYDROCHLORIDE 10 MG: 10 TABLET ORAL at 11:58

## 2021-05-03 RX ADMIN — OXYCODONE HYDROCHLORIDE 10 MG: 10 TABLET ORAL at 16:31

## 2021-05-03 RX ADMIN — HYDROMORPHONE HYDROCHLORIDE 1 MG: 1 INJECTION, SOLUTION INTRAMUSCULAR; INTRAVENOUS; SUBCUTANEOUS at 23:35

## 2021-05-03 ASSESSMENT — PAIN DESCRIPTION - PAIN TYPE
TYPE: ACUTE PAIN;SURGICAL PAIN
TYPE: SURGICAL PAIN
TYPE: SURGICAL PAIN
TYPE: ACUTE PAIN;SURGICAL PAIN
TYPE: ACUTE PAIN;SURGICAL PAIN
TYPE: SURGICAL PAIN

## 2021-05-03 ASSESSMENT — PAIN DESCRIPTION - ORIENTATION
ORIENTATION: RIGHT;LEFT
ORIENTATION: LOWER
ORIENTATION: LOWER;MID

## 2021-05-03 ASSESSMENT — PAIN SCALES - GENERAL
PAINLEVEL_OUTOF10: 8
PAINLEVEL_OUTOF10: 8
PAINLEVEL_OUTOF10: 7
PAINLEVEL_OUTOF10: 7
PAINLEVEL_OUTOF10: 8
PAINLEVEL_OUTOF10: 10
PAINLEVEL_OUTOF10: 10
PAINLEVEL_OUTOF10: 0
PAINLEVEL_OUTOF10: 9
PAINLEVEL_OUTOF10: 10
PAINLEVEL_OUTOF10: 5
PAINLEVEL_OUTOF10: 0
PAINLEVEL_OUTOF10: 7
PAINLEVEL_OUTOF10: 10
PAINLEVEL_OUTOF10: 0
PAINLEVEL_OUTOF10: 7
PAINLEVEL_OUTOF10: 0
PAINLEVEL_OUTOF10: 8
PAINLEVEL_OUTOF10: 9
PAINLEVEL_OUTOF10: 8

## 2021-05-03 ASSESSMENT — PAIN DESCRIPTION - ONSET
ONSET: ON-GOING

## 2021-05-03 ASSESSMENT — PAIN DESCRIPTION - DESCRIPTORS
DESCRIPTORS: BURNING;SHARP
DESCRIPTORS: DISCOMFORT;BURNING;TENDER
DESCRIPTORS: DISCOMFORT;BURNING;TENDER
DESCRIPTORS: ACHING;BURNING;DISCOMFORT
DESCRIPTORS: ACHING;BURNING;CONSTANT;DISCOMFORT
DESCRIPTORS: ACHING;DISCOMFORT;SORE
DESCRIPTORS: ACHING;SHARP;SORE
DESCRIPTORS: ACHING;DISCOMFORT;SORE
DESCRIPTORS: DISCOMFORT;BURNING;TENDER

## 2021-05-03 ASSESSMENT — PAIN DESCRIPTION - FREQUENCY
FREQUENCY: CONTINUOUS

## 2021-05-03 ASSESSMENT — PAIN DESCRIPTION - PROGRESSION
CLINICAL_PROGRESSION: NOT CHANGED
CLINICAL_PROGRESSION: NOT CHANGED
CLINICAL_PROGRESSION: GRADUALLY WORSENING
CLINICAL_PROGRESSION: NOT CHANGED
CLINICAL_PROGRESSION: NOT CHANGED

## 2021-05-03 ASSESSMENT — PAIN DESCRIPTION - LOCATION
LOCATION: LEG
LOCATION: LEG
LOCATION: BACK
LOCATION: LEG
LOCATION: BACK
LOCATION: LEG

## 2021-05-03 ASSESSMENT — PAIN - FUNCTIONAL ASSESSMENT
PAIN_FUNCTIONAL_ASSESSMENT: PREVENTS OR INTERFERES SOME ACTIVE ACTIVITIES AND ADLS
PAIN_FUNCTIONAL_ASSESSMENT: PREVENTS OR INTERFERES SOME ACTIVE ACTIVITIES AND ADLS

## 2021-05-03 NOTE — PROGRESS NOTES
Occupational Therapy  OT BEDSIDE TREATMENT NOTE      Date:5/3/2021  Patient Name: Efraín Nieves  MRN: [id number]  : 1965  Room: [address] [place] [address]       Pt's chart reviewed and treatment attempted, pt initially agreed but when attempting to lower bed pt declined at that time due to pain. Educated pt on importance of OOB activity and participation in therapy. Will attempt at a later time/date.       Lorraine Mccoy 87

## 2021-05-03 NOTE — CARE COORDINATION
5/3/2021 social work transition of care planning  Await further evals and recs to assist with transition of care planning. Previous plan is home.   Electronically signed by AIYANA Sandoval on 5/3/2021 at 9:35 AM

## 2021-05-03 NOTE — CONSULTS
Pain Management Consult      Jhony Barreto is a 48 y.o. male, with YOB: 1965 with the following history as recorded  in [facility]:    CC: mid to low back pain radiating down both legs     HPI: This is a 48year old male who has a history of acute mid to low back pain with radicular pain down  both legs. The injury started a little over a month ago after removing a seat cushion in his vehicle and the pain  progressively worsened. Pt has a history of kidney stones in the past accompanied by back pain and  attributed pain to a possible kidney stone. The patient returned to emergency room for the second time  after no improvement with po antibiotics for suspected kidney infection. The patient underwent an CT of  thoracic spine that revealed subacute T12 compression fracture, lesion to right adrenal gland and erosive  disc suspicious for discitis. The patient also had MRI of thoracic spine was found to have a destructive lesion of his thoracic vertebrae. The patient underwent T12 kyphoplasty with Bilateral T12 laminectomy  and resection of ventral epidural mass on 4/30/21 with Dr Mary Wagner. The pt is currently pending biopsy  results at this time. The patient also underwent a non-contrast CT of his abdomen which revealed that  there was a large mass in the lower pole of the right kidney. Pt reports he continues to have post op pain  from the mid thoracic spine down to lumbar spine and down both legs. Pt reports the pain is controlled at  rest but when he attempts to move or get out of bed the pain is debilitating and severe. Prior to this  admission pt was previously taking Tramadol by his PCP for foot pain and nothing else. Pt reports physical  therapy was attempted but the pain was too severe to continue.     Imaging studies:        MRI of Lumbar spine 04/28/21:    Impression    Acute to subacute superior endplate compression fracture of T12 with 40%   height loss and 5 mm retropulsion into the spinal canal. There is associated   extensive anterior epidural hematoma at this level which contributes to   severe canal stenosis with cord compression at the level of T12 and moderate bilateral neural foraminal narrowing at T11-T12 and T12-L1. No evidence of spondylo discitis. Chronic superior endplate compression fracture of T8 with 25% height loss. MRI of thoracic spine 21:    Impression    Acute to subacute superior endplate compression fracture of T12 with 40%   height loss and 5 mm retropulsion into the spinal canal. There is associated  extensive anterior epidural hematoma at this level which contributes to   severe canal stenosis with cord compression at the level of T12 and moderate bilateral neural foraminal narrowing at T11-T12 and T12-L1. No evidence of spondylo discitis. Chronic superior endplate compression fracture of T8 with 25% height loss. CT of thoracic spine 21:    Impression    1. Subacute T12 compression fracture demonstrating 30% loss of height. 2. Mild spinal canal stenosis at T11-T12 secondary to 4 mm of retropulsion of the posterior margin of the T12 vertebral body into the spinal canal.   3. No findings to suggest discitis/osteomyelitis. 4. Indeterminate 2.2 cm right adrenal nodule for which an MRI with contrast   is recommended for further characterization. Xray of lumbar spine 21:    FINDINGS:   Erosive changes are partially imaged at the T11-T12 disc space with mild   height loss of the superior T12 vertebral body. There are minimal osseous   degenerative changes in the lumbar spine with a tiny anterior osteophyte at   L4. The lumbar disc space heights appear preserved. No listhesis. There is   evidence of aortic atherosclerosis. ALLERGIES: No known drug allergies. MEDICATIONS: As per the flow sheet. FAMILY HISTORY: Mother  of breast cancer at 79. Father is alive,    80; has hypertension, diabetes.         SOCIAL HISTORY: and weaning protocol measures in preparation for possible discharge. Discussed the case with Kenya Crane CNP, agree with Impression and Plan.   Annette Camacho

## 2021-05-03 NOTE — PROGRESS NOTES
[facility]  ([facility])      PROGRESS NOTE         PATIENT NAME: Cr Saeed   YOB: 1965  ADMISSION DATE: 4/25/2021  4:38 PM   TODAY'S DATE: 5/3/2021        Subjective       Patient still having bilateral lower extremity pain  Concerns for metastatic renal cell carcinoma  No issues with urinating      Objective     VS:   BP (!) 170/91   Pulse 99   Temp 97.1 °F (36.2 °C) (Infrared)   Resp 18   Ht 6' (1.829 m)   Wt 283 lb 2 oz (128.4 kg)   SpO2 96%   BMI 38.40 kg/m²     I & O - 24hr:    Intake/Output Summary (Last 24 hours) at 5/3/2021 1647  Last data filed at 5/3/2021 1453  Gross per 24 hour   Intake 2371 ml   Output 1600 ml   Net 771 ml         Physical Exam:  General:  Neck:  Resp:  Abdomen:   No acute distress  Supple  Normal effort  Soft, non-tender, nondistended   :   Furred   Skin: Skin color, texture, turgor normal, no rashes or lesions       Labs and Imaging Studies   Labs:    CBC:   Recent Labs     05/03/21  0509   WBC 11.2   HGB 13.5   HCT 42.0   MCV 85.5        BMP:  Recent Labs     05/03/21  0509      K 4.0   CL 97*   CO2 26   BUN 10   CREATININE 0.5*       Magnesium: No results found for: MG   Phosphate: No results found for: PHOS  PT/INR: No results for input(s): PROTIME, INR in the last 72 hours.     U/A:   Lab Results   Component Value Date    LEUKOCYTESUR Negative 05/01/2021    PHUR 5.5 05/01/2021    WBCUA 0-1 05/01/2021    RBCUA NONE 05/01/2021    BACTERIA NONE SEEN 05/01/2021    SPECGRAV 1.020 05/01/2021    BLOODU Negative 05/01/2021    GLUCOSEU Negative 05/01/2021       Urine Culture:       Component Value Date/Time    LABURIN Growth not present 04/25/2021 1513        Blood Culture:     Imaging Studies:  Reviewed all imaging studies        Assessment and Plan       ASSESMENT:  55-year-old male with large right renal mass with possible metastatic deposits, burst fracture of the thoracic spine status post biopsy and fixation    PLAN:  We will await final pathology from spinal biopsy  Should this show metastatic disease oncology is already following and we will defer to their management  Should this be nondiagnostic he may need a renal biopsy.   We will continue to follow at this time  No acute intervention      Fariba Gonsales MD  [facility]  5/3/2021  4:47 PM

## 2021-05-03 NOTE — PROGRESS NOTES
Chief Complaint:  Chief Complaint   Patient presents with    Back Pain     middle back; \"feels like a knot\"    Leg Pain     both thighs     Burst fracture of twelfth thoracic vertebra (HCC)     Subjective:    Pain is reasonably well controlled. Today he states that actually both of his legs have felt a bit weak since surgery. He had never mentioned this to me until now. Objective:    BP (!) 161/83   Pulse 94   Temp 98.7 °F (37.1 °C) (Temporal)   Resp 17   Ht 6' (1.829 m)   Wt 283 lb 2 oz (128.4 kg)   SpO2 96%   BMI 38.40 kg/m²     Current medications that patient is taking have been reviewed. General appearance: NAD, conversant  HEENT: AT/NC, MMM  Neck: FROM, supple  Lungs: Clear to auscultation, WOB normal  CV: RRR, no MRGs  Abdomen: Soft, non-tender; no masses or HSM, +BS  Extremities: No peripheral edema or digital cyanosis  Skin: He has some \"skin tags\" that upon further inspection seem unusually large with broader bases  Psych: Calm, cooperative  Neuro: Alert and interactive, face symmetric, moving all extremities, speech fluent. Strength 5/5 in B/L LE's.     Labs:  CBC with Differential:    Lab Results   Component Value Date    WBC 11.2 05/03/2021    RBC 4.91 05/03/2021    HGB 13.5 05/03/2021    HCT 42.0 05/03/2021     05/03/2021    MCV 85.5 05/03/2021    MCH 27.5 05/03/2021    MCHC 32.1 05/03/2021    RDW 12.7 05/03/2021    LYMPHOPCT 20.1 05/03/2021    MONOPCT 9.5 05/03/2021    BASOPCT 0.6 05/03/2021    MONOSABS 1.06 05/03/2021    LYMPHSABS 2.24 05/03/2021    EOSABS 0.32 05/03/2021    BASOSABS 0.07 05/03/2021     CMP:    Lab Results   Component Value Date     05/03/2021    K 4.0 05/03/2021    K 4.4 04/29/2021    CL 97 05/03/2021    CO2 26 05/03/2021    BUN 10 05/03/2021    CREATININE 0.5 05/03/2021    GFRAA >60 05/03/2021    LABGLOM >60 05/03/2021    GLUCOSE 128 05/03/2021    PROT 6.8 04/28/2021    LABALBU 3.5 04/28/2021    CALCIUM 8.9 05/03/2021    BILITOT <0.2 04/28/2021 ALKPHOS 76 04/28/2021    AST 10 04/28/2021    ALT 13 04/28/2021        Assessment/Plan:  Principal Problem:    Burst fracture of twelfth thoracic vertebra (HCC)  Active Problems:    Hyponatremia    Diabetes mellitus (HCC)    Tobacco abuse    Adrenal nodule (HCC) - incidental    Spinal cord compression (HCC)    Acute thoracic back pain    Pathologic fracture    Spinal cord tumor  Resolved Problems:    * No resolved hospital problems. *       Path pending. Spoke w/lab, expect results possibly tomorrow but no guarantees    Concern for RCC metastasis    Will need PET and likely further kidney imaging    Activity per Neurosurgery    Glucose well controlled    Pain management consult    Continue tylenol, naproxen, gabapentin, oxycodone, dilaudid for breakthrough.     Requires continued inpatient level of care   Hank Yadav    1:09 PM  5/3/2021

## 2021-05-03 NOTE — PROGRESS NOTES
[address] [place] [address] [facility]  Progress Note      C/C : R/O discitis    SUBJECTIVE:    Denies fever and chills  Reports pain all over the body   No nausea, vomiting, diarrhea. .     Review of systems:  As stated above in the chief complaint, otherwise negative. Medications:  Scheduled Meds:   enoxaparin  40 mg Subcutaneous Daily    gabapentin  600 mg Oral TID    sodium chloride flush  5-40 mL Intravenous 2 times per day    oxyCODONE  10 mg Oral 2 times per day    sennosides-docusate sodium  1 tablet Oral BID    polyethylene glycol  17 g Oral BID    nicotine  1 patch Transdermal Daily    acetaminophen  1,000 mg Oral TID    lamoTRIgine  100 mg Oral BID    sodium chloride flush  10 mL Intravenous 2 times per day    insulin lispro  0-6 Units Subcutaneous TID WC    insulin lispro  0-3 Units Subcutaneous Nightly    lisinopril  20 mg Oral Daily     Continuous Infusions:   sodium chloride      sodium chloride 30 mL/hr at 21 0007    sodium chloride      dextrose       PRN Meds:bisacodyl, HYDROmorphone **OR** HYDROmorphone, diazePAM, sodium chloride flush, sodium chloride, promethazine **OR** ondansetron, oxyCODONE **OR** oxyCODONE, sodium chloride flush, sodium chloride, potassium chloride **OR** potassium alternative oral replacement **OR** potassium chloride, magnesium hydroxide, glucose, dextrose, glucagon (rDNA), dextrose, trimethobenzamide    OBJECTIVE:  BP (!) 161/83   Pulse 94   Temp 98.7 °F (37.1 °C) (Temporal)   Resp 17   Ht 6' (1.829 m)   Wt 283 lb 2 oz (128.4 kg)   SpO2 96%   BMI 38.40 kg/m²   Temp  Av.9 °F (37.2 °C)  Min: 98.7 °F (37.1 °C)  Max: 99.1 °F (37.3 °C)     Constitutional: The patient is awake, alert, and oriented. Skin: Warm and dry. No rashes were noted. HEENT: Round and reactive pupils. Moist mucous membranes. No ulcerations or thrush. Neck: Supple to movements.    Chest: Clear , no wheeze or crackles   Cardiovascular: S1 and S2 are rhythmic and regular. No murmurs appreciated. Abdomen: Positive bowel sounds to auscultation. Benign to palpation. No masses felt. No hepatosplenomegaly. Extremities: No clubbing, no cyanosis, no edema. Neurological: No focal motor or sensory loss however there is some weakness in lower extremities bilaterally  Lines: peripheral    Laboratory and Tests Review:  Lab Results   Component Value Date    WBC 11.2 05/03/2021    WBC 9.6 04/28/2021    WBC 8.9 04/27/2021    HGB 13.5 05/03/2021    HCT 42.0 05/03/2021    MCV 85.5 05/03/2021     05/03/2021     Lab Results   Component Value Date    NEUTROABS 7.43 (H) 05/03/2021    NEUTROABS 6.53 04/28/2021    NEUTROABS 4.00 04/27/2021     No results found for: [facility]  Lab Results   Component Value Date    ALT 13 04/28/2021    AST 10 04/28/2021    ALKPHOS 76 04/28/2021    BILITOT <0.2 04/28/2021     Lab Results   Component Value Date     05/03/2021    K 4.0 05/03/2021    K 4.4 04/29/2021    CL 97 05/03/2021    CO2 26 05/03/2021    BUN 10 05/03/2021    CREATININE 0.5 05/03/2021    CREATININE 0.6 04/29/2021    CREATININE 0.7 04/28/2021    GFRAA >60 05/03/2021    LABGLOM >60 05/03/2021    GLUCOSE 128 05/03/2021    PROT 6.8 04/28/2021    LABALBU 3.5 04/28/2021    CALCIUM 8.9 05/03/2021    BILITOT <0.2 04/28/2021    ALKPHOS 76 04/28/2021    AST 10 04/28/2021    ALT 13 04/28/2021     Lab Results   Component Value Date    CRP 1.0 (H) 04/28/2021    CRP 3.0 (H) 04/26/2021     Lab Results   Component Value Date    SEDRATE 57 (H) 04/28/2021    SEDRATE 68 (H) 04/26/2021     Radiology:  MRI  Acute to subacute superior endplate compression fracture of T12 with 40%   height loss and 5 mm retropulsion into the spinal canal.  There is associated   extensive anterior epidural hematoma at this level which contributes to   severe canal stenosis with cord compression at the level of T12 and moderate   bilateral neural foraminal narrowing at T11-T12 and T12-L1.      CT abdomen and pelvis -  Impression     Heterogeneous appearance of the right kidney with complex mass in the i   inferior pole concerning for necrotic malignancy.  Additional hypodense   lesions are also identified.  Differential consideration will also include   multifocal pyelonephritis with abscess and or malignancy.        Microbiology:   Lab Results   Component Value Date    BC 5 Days no growth 04/25/2021     Lab Results   Component Value Date    BLOODCULT2 5 Days no growth 04/25/2021     No results found for: WNDABS  No results found for: RESPSMEAR  No results found for: Latasha Maul, LABLEGI, AFBCX, FUNGSM, LABFUNG  No results found for: CULTRESP  No results found for: CXCATHTIP  No results found for: BFCS  Culture Surgical   Date Value Ref Range Status   04/30/2021 Growth not present  Final     Urine Culture, Routine   Date Value Ref Range Status   04/25/2021 Growth not present  Final     No results found for: [address]     Assessment:    · T11-T12 compression fracture with spinal stenosis-s/p laminectomy ( 4/30) cx neg to date   · Kidney cancer ( likely )        Plan:    · Watch off abx   · Follow cx, pathology   · Appreciated urology consult        Electronically signed by Redd Evangelista MD on 5/3/2021 at 4:14 PM

## 2021-05-03 NOTE — PROGRESS NOTES
Department of Neurosurgery  Progress Note    CHIEF COMPLAINT: s/p T12 kyphoplasty/laminectomy    SUBJECTIVE:  C/o op site pain. REVIEW OF SYSTEMS :  Constitutional: Negative for chills and fever. Neurological: Negative for dizziness, tremors and speech change.      OBJECTIVE:   VITALS:  BP (!) 161/83   Pulse 94   Temp 98.7 °F (37.1 °C) (Temporal)   Resp 17   Ht 6' (1.829 m)   Wt 283 lb 2 oz (128.4 kg)   SpO2 96%   BMI 38.40 kg/m²   PHYSICAL:  CONSTITUTIONAL:  awake, alert, cooperative, no apparent distress, and appears stated age and FINE/FC    DATA:  CBC:   Lab Results   Component Value Date    WBC 11.2 05/03/2021    RBC 4.91 05/03/2021    HGB 13.5 05/03/2021    HCT 42.0 05/03/2021    MCV 85.5 05/03/2021    MCH 27.5 05/03/2021    MCHC 32.1 05/03/2021    RDW 12.7 05/03/2021     05/03/2021    MPV 10.0 05/03/2021     BMP:    Lab Results   Component Value Date     05/03/2021    K 4.0 05/03/2021    K 4.4 04/29/2021    CL 97 05/03/2021    CO2 26 05/03/2021    BUN 10 05/03/2021    LABALBU 3.5 04/28/2021    CREATININE 0.5 05/03/2021    CALCIUM 8.9 05/03/2021    GFRAA >60 05/03/2021    LABGLOM >60 05/03/2021    GLUCOSE 128 05/03/2021     PT/INR:    Lab Results   Component Value Date    PROTIME 12.2 04/30/2021    INR 1.1 04/30/2021     PTT:    Lab Results   Component Value Date    APTT 33.8 04/30/2021   [APTT}    Current Inpatient Medications  Current Facility-Administered Medications: bisacodyl (DULCOLAX) suppository 10 mg, 10 mg, Rectal, Daily PRN  HYDROmorphone (DILAUDID) injection 1 mg, 1 mg, Intravenous, Q4H PRN **OR** HYDROmorphone (DILAUDID) injection 0.5 mg, 0.5 mg, Intravenous, Q4H PRN  gabapentin (NEURONTIN) capsule 600 mg, 600 mg, Oral, TID  naproxen (NAPROSYN) tablet 375 mg, 375 mg, Oral, BID WC  sodium chloride flush 0.9 % injection 5-40 mL, 5-40 mL, Intravenous, 2 times per day  diazePAM (VALIUM) tablet 5 mg, 5 mg, Oral, Q6H PRN  sodium chloride flush 0.9 % injection 5-40 mL, 5-40 mL, Intravenous, PRN  0.9 % sodium chloride infusion, 25 mL, Intravenous, PRN  promethazine (PHENERGAN) tablet 12.5 mg, 12.5 mg, Oral, Q6H PRN **OR** ondansetron (ZOFRAN) injection 4 mg, 4 mg, Intravenous, Q6H PRN  0.9 % sodium chloride infusion, , Intravenous, Continuous  oxyCODONE (OXYCONTIN) extended release tablet 10 mg, 10 mg, Oral, 2 times per day  sennosides-docusate sodium (SENOKOT-S) 8.6-50 MG tablet 1 tablet, 1 tablet, Oral, BID  polyethylene glycol (GLYCOLAX) packet 17 g, 17 g, Oral, BID  oxyCODONE (ROXICODONE) immediate release tablet 5 mg, 5 mg, Oral, Q4H PRN **OR** oxyCODONE HCl (OXY-IR) immediate release tablet 10 mg, 10 mg, Oral, Q4H PRN  nicotine (NICODERM CQ) 21 MG/24HR 1 patch, 1 patch, Transdermal, Daily  acetaminophen (TYLENOL) tablet 1,000 mg, 1,000 mg, Oral, TID  lamoTRIgine (LAMICTAL) tablet 100 mg, 100 mg, Oral, BID  sodium chloride flush 0.9 % injection 10 mL, 10 mL, Intravenous, 2 times per day  sodium chloride flush 0.9 % injection 10 mL, 10 mL, Intravenous, PRN  0.9 % sodium chloride infusion, 25 mL, Intravenous, PRN  potassium chloride (KLOR-CON M) extended release tablet 40 mEq, 40 mEq, Oral, PRN **OR** potassium bicarb-citric acid (EFFER-K) effervescent tablet 40 mEq, 40 mEq, Oral, PRN **OR** potassium chloride 10 mEq/100 mL IVPB (Peripheral Line), 10 mEq, Intravenous, PRN  magnesium hydroxide (MILK OF MAGNESIA) 400 MG/5ML suspension 30 mL, 30 mL, Oral, Daily PRN  glucose (GLUTOSE) 40 % oral gel 15 g, 15 g, Oral, PRN  dextrose 50 % IV solution, 12.5 g, Intravenous, PRN  glucagon (rDNA) injection 1 mg, 1 mg, Intramuscular, PRN  dextrose 5 % solution, 100 mL/hr, Intravenous, PRN  insulin lispro (HUMALOG) injection vial 0-6 Units, 0-6 Units, Subcutaneous, TID WC  insulin lispro (HUMALOG) injection vial 0-3 Units, 0-3 Units, Subcutaneous, Nightly  trimethobenzamide (TIGAN) injection 200 mg, 200 mg, Intramuscular, Q6H PRN  lisinopril (PRINIVIL;ZESTRIL) tablet 20 mg, 20 mg, Oral, Daily    ASSESSMENT:   · S/p T12 kyphoplasty and T12 laminectomy for tumor decompression on 4/30    PLAN:  · Await pathology results  · WBAT with TLSO  · Pain control  · Suppository  · Start lovenox   · Oncology consulted      Electronically signed by Johnson Adler PA-C on 5/3/2021 at 11:01 AM

## 2021-05-03 NOTE — PROGRESS NOTES
Comprehensive Nutrition Assessment    Type and Reason for Visit:  Initial    Nutrition Recommendations/Plan: Continue Current Diet, Start Glucerna TID    Nutrition Assessment:  Pt admit w/ T12 burst fx s/p kyphoplasty/lami for tumor decompression. Noted possible metastatic renal cell CA. Will add ONS to optimize PO intakes and continue to monitor. Malnutrition Assessment:  Malnutrition Status:   At risk for malnutrition (Comment)    Context:  Acute Illness     Findings of the 6 clinical characteristics of malnutrition:  Energy Intake:  1 - 75% or less of estimated energy requirements for 7 or more days  Weight Loss:  Unable to assess     Body Fat Loss:  No significant body fat loss     Muscle Mass Loss:  No significant muscle mass loss    Fluid Accumulation:  No significant fluid accumulation     Strength:  Not Performed    Estimated Daily Nutrient Needs:  Energy (kcal):  ; Weight Used for Energy Requirements:  Admission     Protein (g):  120-145; Weight Used for Protein Requirements:  Ideal(1.5-1.8)        Fluid (ml/day):  2400; Method Used for Fluid Requirements:  1 ml/kcal      Nutrition Related Findings:  A&Ox4, abd WDL, no noted edema, +I/Os      Wounds:  Surgical Incision       Current Nutrition Therapies:    DIET CARB CONTROL; Carb Control: 4 carb choices (60 gms)/meal    Anthropometric Measures:  · Height: 6' (182.9 cm)  · Current Body Weight: 265 lb (120.2 kg)(4/26 actual - # 5/3, elevated w/ +fluids)   · Admission Body Weight: 265 lb (120.2 kg)(4/26 actual)    · Usual Body Weight: (UTO, no wt hx on file)     · Ideal Body Weight: 178 lbs; % Ideal Body Weight 148.9 %   · BMI: 35.9  · BMI Categories: Obese Class 2 (BMI 35.0 -39.9)       Nutrition Diagnosis:   · Inadequate oral intake related to pain(2/2 burst fx/likely metastatic renal cell CA) as evidenced by intake 0-25%    Nutrition Interventions:   Food and/or Nutrient Delivery:  Continue Current Diet, Start Oral Nutrition Supplement(Glucerna TID)  Nutrition Education/Counseling:  Education not indicated   Coordination of Nutrition Care:  Continue to monitor while inpatient    Goals:  pt to consume >75% meals/ONS       Nutrition Monitoring and Evaluation:   Food/Nutrient Intake Outcomes:  Food and Nutrient Intake, Supplement Intake  Physical Signs/Symptoms Outcomes:  Biochemical Data, GI Status, Fluid Status or Edema, Nutrition Focused Physical Findings, Skin, Weight     Discharge Planning:     Too soon to determine     Electronically signed by Shanda Goldberg, MS, RD, LD on 5/3/21 at 12:07 PM EDT    Contact: [phone]

## 2021-05-03 NOTE — PROGRESS NOTES
Subjective:  Pain is controlled but does complains of some weakness in his legs. He is eating and drinking. No  or GI blood loss. Denies any chest pain or SOB. Objective:    BP (!) 161/83   Pulse 94   Temp 98.7 °F (37.1 °C) (Temporal)   Resp 17   Ht 6' (1.829 m)   Wt 283 lb 2 oz (128.4 kg)   SpO2 96%   BMI 38.40 kg/m²     General: NAD, obese. HEENT: No thrush or mucositis, EOMI, PERRLA  Heart:  RRR, no murmurs, gallops, or rubs.   Lungs:  CTA bilaterally, no wheeze, rales or rhonchi  Abd: bowel sounds present, nontender, nondistended, no masses  Extrem:  No clubbing, cyanosis, or edema  Lymphatics: No palpable adenopathy in cervical and supraclavicular regions  Skin: Intact, no petechia or purpura    CBC with Differential:    Lab Results   Component Value Date    WBC 11.2 05/03/2021    RBC 4.91 05/03/2021    HGB 13.5 05/03/2021    HCT 42.0 05/03/2021     05/03/2021    MCV 85.5 05/03/2021    MCH 27.5 05/03/2021    MCHC 32.1 05/03/2021    RDW 12.7 05/03/2021    LYMPHOPCT 20.1 05/03/2021    MONOPCT 9.5 05/03/2021    BASOPCT 0.6 05/03/2021    MONOSABS 1.06 05/03/2021    LYMPHSABS 2.24 05/03/2021    EOSABS 0.32 05/03/2021    BASOSABS 0.07 05/03/2021     CMP:    Lab Results   Component Value Date     05/03/2021    K 4.0 05/03/2021    K 4.4 04/29/2021    CL 97 05/03/2021    CO2 26 05/03/2021    BUN 10 05/03/2021    CREATININE 0.5 05/03/2021    GFRAA >60 05/03/2021    LABGLOM >60 05/03/2021    GLUCOSE 128 05/03/2021    PROT 6.8 04/28/2021    LABALBU 3.5 04/28/2021    CALCIUM 8.9 05/03/2021    BILITOT <0.2 04/28/2021    ALKPHOS 76 04/28/2021    AST 10 04/28/2021    ALT 13 04/28/2021         Current Facility-Administered Medications:     enoxaparin (LOVENOX) injection 40 mg, 40 mg, Subcutaneous, Daily, Ashlyn Hudson PA-C    bisacodyl (DULCOLAX) suppository 10 mg, 10 mg, Rectal, Daily PRN, BLESSING Herndon    HYDROmorphone (DILAUDID) injection 1 mg, 1 mg, Intravenous, Q4H PRN, 1 mg at 05/03/21 0946 **OR** HYDROmorphone (DILAUDID) injection 0.5 mg, 0.5 mg, Intravenous, Q4H PRN, Oscar Cassidy MD    gabapentin (NEURONTIN) capsule 600 mg, 600 mg, Oral, TID, Oscar Cassidy MD, 600 mg at 05/03/21 9921    sodium chloride flush 0.9 % injection 5-40 mL, 5-40 mL, Intravenous, 2 times per day, Noam Mac, PA    diazePAM (VALIUM) tablet 5 mg, 5 mg, Oral, Q6H PRN, Noam Mac, PA, 5 mg at 05/03/21 0950    sodium chloride flush 0.9 % injection 5-40 mL, 5-40 mL, Intravenous, PRN, Noam Mac, PA    0.9 % sodium chloride infusion, 25 mL, Intravenous, PRN, Noam Mac, PA    promethazine (PHENERGAN) tablet 12.5 mg, 12.5 mg, Oral, Q6H PRN **OR** ondansetron (ZOFRAN) injection 4 mg, 4 mg, Intravenous, Q6H PRN, Noam Mac, PA    0.9 % sodium chloride infusion, , Intravenous, Continuous, Noam Mac, PA, Last Rate: 30 mL/hr at 05/03/21 0007, Rate Change at 05/03/21 0007    oxyCODONE (OXYCONTIN) extended release tablet 10 mg, 10 mg, Oral, 2 times per day, Wilder Hummel MD, 10 mg at 05/03/21 0939    sennosides-docusate sodium (SENOKOT-S) 8.6-50 MG tablet 1 tablet, 1 tablet, Oral, BID, Noam Mac, PA, 1 tablet at 05/03/21 0941    polyethylene glycol (GLYCOLAX) packet 17 g, 17 g, Oral, BID, Noam Mac, PA, 17 g at 05/03/21 0939    oxyCODONE (ROXICODONE) immediate release tablet 5 mg, 5 mg, Oral, Q4H PRN **OR** oxyCODONE HCl (OXY-IR) immediate release tablet 10 mg, 10 mg, Oral, Q4H PRN, Noam Mac, PA, 10 mg at 05/03/21 1158    nicotine (NICODERM CQ) 21 MG/24HR 1 patch, 1 patch, Transdermal, Daily, BLESSING Bennett, 1 patch at 05/03/21 0941    acetaminophen (TYLENOL) tablet 1,000 mg, 1,000 mg, Oral, TID, BLESSING Bennett, 1,000 mg at 05/03/21 0913    lamoTRIgine (LAMICTAL) tablet 100 mg, 100 mg, Oral, BID, BLESSING Bennett, 100 mg at 05/03/21 0941    sodium chloride flush 0.9 % injection 10 mL, 10 mL, Intravenous, 2 times per day, Noam Mac, PA, 10 mL at 05/03/21 0946    sodium chloride flush 0.9 % injection 10 mL, 10 mL, Intravenous, PRN, BLESSING Ku, 10 mL at 05/01/21 1012    0.9 % sodium chloride infusion, 25 mL, Intravenous, PRN, BLESSING Ku    potassium chloride (KLOR-CON M) extended release tablet 40 mEq, 40 mEq, Oral, PRN **OR** potassium bicarb-citric acid (EFFER-K) effervescent tablet 40 mEq, 40 mEq, Oral, PRN **OR** potassium chloride 10 mEq/100 mL IVPB (Peripheral Line), 10 mEq, Intravenous, PRN, BLESSING Ku    magnesium hydroxide (MILK OF MAGNESIA) 400 MG/5ML suspension 30 mL, 30 mL, Oral, Daily PRN, BLESSING Ku    glucose (GLUTOSE) 40 % oral gel 15 g, 15 g, Oral, PRN, BLESSING Ku    dextrose 50 % IV solution, 12.5 g, Intravenous, PRN, BLESSING Ku    glucagon (rDNA) injection 1 mg, 1 mg, Intramuscular, PRN, BLESSING Ku    dextrose 5 % solution, 100 mL/hr, Intravenous, PRN, BLESSING Ku    insulin lispro (HUMALOG) injection vial 0-6 Units, 0-6 Units, Subcutaneous, TID WC, BLESSING Ku, 3 Units at 05/03/21 1158    insulin lispro (HUMALOG) injection vial 0-3 Units, 0-3 Units, Subcutaneous, Nightly, BLESSING Ku, 1 Units at 04/30/21 2118    trimethobenzamide (TIGAN) injection 200 mg, 200 mg, Intramuscular, Q6H PRN, BLESSING Ku    lisinopril (PRINIVIL;ZESTRIL) tablet 20 mg, 20 mg, Oral, Daily, BLESSING Ku, 20 mg at 05/03/21 0941    Assessment:    Principal Problem:    Burst fracture of twelfth thoracic vertebra (HCC)  Active Problems:    Hyponatremia    Diabetes mellitus (HCC)    Tobacco abuse    Adrenal nodule (HCC) - incidental    Spinal cord compression (HCC)    Acute thoracic back pain    Pathologic fracture    Spinal cord tumor  Resolved Problems:    * No resolved hospital problems.  *    69-year-old gentleman with pathologic T12 compression fracture s/p T12 Laminectomy and a large right renal mass; another mass in the

## 2021-05-04 LAB
METER GLUCOSE: 146 MG/DL (ref 74–99)
METER GLUCOSE: 158 MG/DL (ref 74–99)
METER GLUCOSE: 231 MG/DL (ref 74–99)
METER GLUCOSE: 243 MG/DL (ref 74–99)

## 2021-05-04 PROCEDURE — 97535 SELF CARE MNGMENT TRAINING: CPT

## 2021-05-04 PROCEDURE — 1200000000 HC SEMI PRIVATE

## 2021-05-04 PROCEDURE — 97530 THERAPEUTIC ACTIVITIES: CPT

## 2021-05-04 PROCEDURE — 6370000000 HC RX 637 (ALT 250 FOR IP): Performed by: NEUROLOGICAL SURGERY

## 2021-05-04 PROCEDURE — 6370000000 HC RX 637 (ALT 250 FOR IP): Performed by: PHYSICIAN ASSISTANT

## 2021-05-04 PROCEDURE — 2580000003 HC RX 258: Performed by: PHYSICIAN ASSISTANT

## 2021-05-04 PROCEDURE — 6360000002 HC RX W HCPCS: Performed by: PHYSICIAN ASSISTANT

## 2021-05-04 PROCEDURE — 82962 GLUCOSE BLOOD TEST: CPT

## 2021-05-04 PROCEDURE — 6370000000 HC RX 637 (ALT 250 FOR IP): Performed by: INTERNAL MEDICINE

## 2021-05-04 PROCEDURE — 6360000002 HC RX W HCPCS: Performed by: INTERNAL MEDICINE

## 2021-05-04 RX ADMIN — ACETAMINOPHEN 1000 MG: 500 TABLET, FILM COATED ORAL at 21:21

## 2021-05-04 RX ADMIN — INSULIN LISPRO 2 UNITS: 100 INJECTION, SOLUTION INTRAVENOUS; SUBCUTANEOUS at 12:24

## 2021-05-04 RX ADMIN — GABAPENTIN 600 MG: 300 CAPSULE ORAL at 21:24

## 2021-05-04 RX ADMIN — HYDROMORPHONE HYDROCHLORIDE 1 MG: 1 INJECTION, SOLUTION INTRAMUSCULAR; INTRAVENOUS; SUBCUTANEOUS at 22:43

## 2021-05-04 RX ADMIN — INSULIN LISPRO 1 UNITS: 100 INJECTION, SOLUTION INTRAVENOUS; SUBCUTANEOUS at 18:11

## 2021-05-04 RX ADMIN — SODIUM CHLORIDE: 9 INJECTION, SOLUTION INTRAVENOUS at 09:46

## 2021-05-04 RX ADMIN — ACETAMINOPHEN 1000 MG: 500 TABLET, FILM COATED ORAL at 09:34

## 2021-05-04 RX ADMIN — INSULIN LISPRO 1 UNITS: 100 INJECTION, SOLUTION INTRAVENOUS; SUBCUTANEOUS at 07:59

## 2021-05-04 RX ADMIN — OXYCODONE HYDROCHLORIDE 10 MG: 10 TABLET, FILM COATED, EXTENDED RELEASE ORAL at 21:22

## 2021-05-04 RX ADMIN — GABAPENTIN 600 MG: 300 CAPSULE ORAL at 12:23

## 2021-05-04 RX ADMIN — SENNOSIDES AND DOCUSATE SODIUM 1 TABLET: 8.6; 5 TABLET ORAL at 21:21

## 2021-05-04 RX ADMIN — HYDROMORPHONE HYDROCHLORIDE 1 MG: 1 INJECTION, SOLUTION INTRAMUSCULAR; INTRAVENOUS; SUBCUTANEOUS at 09:20

## 2021-05-04 RX ADMIN — OXYCODONE HYDROCHLORIDE 10 MG: 10 TABLET ORAL at 05:14

## 2021-05-04 RX ADMIN — ENOXAPARIN SODIUM 40 MG: 40 INJECTION SUBCUTANEOUS at 09:33

## 2021-05-04 RX ADMIN — LAMOTRIGINE 100 MG: 100 TABLET ORAL at 09:35

## 2021-05-04 RX ADMIN — Medication 10 ML: at 18:01

## 2021-05-04 RX ADMIN — HYDROMORPHONE HYDROCHLORIDE 1 MG: 1 INJECTION, SOLUTION INTRAMUSCULAR; INTRAVENOUS; SUBCUTANEOUS at 13:46

## 2021-05-04 RX ADMIN — DIAZEPAM 5 MG: 5 TABLET ORAL at 14:57

## 2021-05-04 RX ADMIN — ACETAMINOPHEN 1000 MG: 500 TABLET, FILM COATED ORAL at 12:23

## 2021-05-04 RX ADMIN — OXYCODONE HYDROCHLORIDE 10 MG: 10 TABLET ORAL at 01:17

## 2021-05-04 RX ADMIN — DIAZEPAM 5 MG: 5 TABLET ORAL at 21:20

## 2021-05-04 RX ADMIN — Medication 10 ML: at 21:25

## 2021-05-04 RX ADMIN — HYDROMORPHONE HYDROCHLORIDE 1 MG: 1 INJECTION, SOLUTION INTRAMUSCULAR; INTRAVENOUS; SUBCUTANEOUS at 18:01

## 2021-05-04 RX ADMIN — OXYCODONE HYDROCHLORIDE 10 MG: 10 TABLET, FILM COATED, EXTENDED RELEASE ORAL at 09:30

## 2021-05-04 RX ADMIN — OXYCODONE HYDROCHLORIDE 10 MG: 10 TABLET ORAL at 19:03

## 2021-05-04 RX ADMIN — LISINOPRIL 20 MG: 20 TABLET ORAL at 09:35

## 2021-05-04 RX ADMIN — OXYCODONE HYDROCHLORIDE 10 MG: 10 TABLET ORAL at 14:46

## 2021-05-04 RX ADMIN — INSULIN LISPRO 1 UNITS: 100 INJECTION, SOLUTION INTRAVENOUS; SUBCUTANEOUS at 21:31

## 2021-05-04 RX ADMIN — LAMOTRIGINE 100 MG: 100 TABLET ORAL at 21:23

## 2021-05-04 RX ADMIN — GABAPENTIN 600 MG: 300 CAPSULE ORAL at 09:33

## 2021-05-04 RX ADMIN — Medication 10 ML: at 09:20

## 2021-05-04 RX ADMIN — Medication 10 ML: at 13:46

## 2021-05-04 ASSESSMENT — PAIN SCALES - GENERAL
PAINLEVEL_OUTOF10: 9
PAINLEVEL_OUTOF10: 9
PAINLEVEL_OUTOF10: 7
PAINLEVEL_OUTOF10: 10
PAINLEVEL_OUTOF10: 7
PAINLEVEL_OUTOF10: 10
PAINLEVEL_OUTOF10: 8
PAINLEVEL_OUTOF10: 9
PAINLEVEL_OUTOF10: 9
PAINLEVEL_OUTOF10: 8
PAINLEVEL_OUTOF10: 9
PAINLEVEL_OUTOF10: 8
PAINLEVEL_OUTOF10: 8

## 2021-05-04 ASSESSMENT — PAIN - FUNCTIONAL ASSESSMENT
PAIN_FUNCTIONAL_ASSESSMENT: PREVENTS OR INTERFERES WITH MANY ACTIVE NOT PASSIVE ACTIVITIES

## 2021-05-04 ASSESSMENT — PAIN DESCRIPTION - PROGRESSION
CLINICAL_PROGRESSION: NOT CHANGED

## 2021-05-04 ASSESSMENT — PAIN DESCRIPTION - LOCATION
LOCATION: LEG
LOCATION: BACK

## 2021-05-04 ASSESSMENT — PAIN DESCRIPTION - ONSET
ONSET: ON-GOING

## 2021-05-04 ASSESSMENT — PAIN DESCRIPTION - ORIENTATION
ORIENTATION: LEFT;RIGHT
ORIENTATION: RIGHT;LEFT
ORIENTATION: RIGHT;LEFT

## 2021-05-04 ASSESSMENT — PAIN DESCRIPTION - PAIN TYPE
TYPE: SURGICAL PAIN
TYPE: SURGICAL PAIN
TYPE: ACUTE PAIN;SURGICAL PAIN
TYPE: SURGICAL PAIN
TYPE: SURGICAL PAIN
TYPE: ACUTE PAIN;SURGICAL PAIN
TYPE: SURGICAL PAIN

## 2021-05-04 ASSESSMENT — PAIN DESCRIPTION - FREQUENCY
FREQUENCY: CONTINUOUS

## 2021-05-04 ASSESSMENT — PAIN DESCRIPTION - DESCRIPTORS
DESCRIPTORS: ACHING;BURNING;TENDER
DESCRIPTORS: BURNING;TENDER;ACHING
DESCRIPTORS: ACHING;BURNING;TENDER
DESCRIPTORS: BURNING;TENDER;ACHING
DESCRIPTORS: DISCOMFORT;BURNING;TENDER
DESCRIPTORS: BURNING;TENDER;ACHING
DESCRIPTORS: BURNING;TENDER;ACHING
DESCRIPTORS: ACHING;BURNING;TENDER

## 2021-05-04 NOTE — PLAN OF CARE
Problem: Falls - Risk of:  Goal: Will remain free from falls  Description: Will remain free from falls  5/4/2021 0212 by Elizabeth Golden RN  Outcome: Met This Shift  5/4/2021 0211 by Elizabeth Golden RN  Outcome: Met This Shift  Goal: Absence of physical injury  Description: Absence of physical injury  5/4/2021 0212 by Elizabeth Golden RN  Outcome: Met This Shift  5/4/2021 0211 by Elizabeth Golden RN  Outcome: Met This Shift     Problem: Skin Integrity:  Goal: Will show no infection signs and symptoms  Description: Will show no infection signs and symptoms  5/4/2021 0212 by Elizabeth Golden RN  Outcome: Met This Shift  5/4/2021 0211 by Elizabeth Golden RN  Outcome: Met This Shift  Goal: Absence of new skin breakdown  Description: Absence of new skin breakdown  5/4/2021 0212 by Elizabeth Golden RN  Outcome: Met This Shift  5/4/2021 0211 by Elizabeth Golden RN  Outcome: Met This Shift     Problem: Pain:  Goal: Pain level will decrease  Description: Pain level will decrease  5/4/2021 0212 by Elizabeth Golden RN  Outcome: Ongoing  5/4/2021 0211 by Elizabeth Golden RN  Outcome: Ongoing  Goal: Control of acute pain  Description: Control of acute pain  5/4/2021 0212 by Elizabeth Golden RN  Outcome: Ongoing  5/4/2021 0211 by Elizabeth Golden RN  Outcome: Ongoing  Goal: Control of chronic pain  Description: Control of chronic pain  5/4/2021 0212 by Elizabeth Golden RN  Outcome: Ongoing  5/4/2021 0211 by Elizabeth Golden RN  Outcome: Ongoing

## 2021-05-04 NOTE — PROGRESS NOTES
Subjective:  Patient looks much better today. He is sitting up in his bed. Just finished with physical therapy. He states pain is controlled at this moment. Objective:    BP (!) 175/89   Pulse 93   Temp 97.6 °F (36.4 °C) (Temporal)   Resp 20 Comment: 20  Ht 6' (1.829 m)   Wt 283 lb 2 oz (128.4 kg)   SpO2 96%   BMI 38.40 kg/m²     General: NAD, obese. tlso on in bed. HEENT: No thrush or mucositis, EOMI, PERRLA  Heart:  RRR, no murmurs, gallops, or rubs.   Lungs:  CTA bilaterally, no wheeze, rales or rhonchi  Abd: bowel sounds present, nontender, nondistended, no masses  Extrem:  No clubbing, cyanosis, or edema  Lymphatics: No palpable adenopathy in cervical and supraclavicular regions  Skin: Intact, no petechia or purpura    CBC with Differential:    Lab Results   Component Value Date    WBC 11.2 05/03/2021    RBC 4.91 05/03/2021    HGB 13.5 05/03/2021    HCT 42.0 05/03/2021     05/03/2021    MCV 85.5 05/03/2021    MCH 27.5 05/03/2021    MCHC 32.1 05/03/2021    RDW 12.7 05/03/2021    LYMPHOPCT 20.1 05/03/2021    MONOPCT 9.5 05/03/2021    BASOPCT 0.6 05/03/2021    MONOSABS 1.06 05/03/2021    LYMPHSABS 2.24 05/03/2021    EOSABS 0.32 05/03/2021    BASOSABS 0.07 05/03/2021     CMP:    Lab Results   Component Value Date     05/03/2021    K 4.0 05/03/2021    K 4.4 04/29/2021    CL 97 05/03/2021    CO2 26 05/03/2021    BUN 10 05/03/2021    CREATININE 0.5 05/03/2021    GFRAA >60 05/03/2021    LABGLOM >60 05/03/2021    GLUCOSE 128 05/03/2021    PROT 6.8 04/28/2021    LABALBU 3.5 04/28/2021    CALCIUM 8.9 05/03/2021    BILITOT <0.2 04/28/2021    ALKPHOS 76 04/28/2021    AST 10 04/28/2021    ALT 13 04/28/2021         Current Facility-Administered Medications:     enoxaparin (LOVENOX) injection 40 mg, 40 mg, Subcutaneous, Daily, Ashlyn Hudson PA-C, 40 mg at 05/04/21 8629    bisacodyl (DULCOLAX) suppository 10 mg, 10 mg, Rectal, Daily PRN, BLESSING Wiley    HYDROmorphone (DILAUDID) injection 1 mg, 1 mg, Intravenous, Q4H PRN, 1 mg at 05/04/21 0920 **OR** HYDROmorphone (DILAUDID) injection 0.5 mg, 0.5 mg, Intravenous, Q4H PRN, Sudarshan Salas MD    gabapentin (NEURONTIN) capsule 600 mg, 600 mg, Oral, TID, Sudarshan Salas MD, 600 mg at 05/04/21 0933    sodium chloride flush 0.9 % injection 5-40 mL, 5-40 mL, Intravenous, 2 times per day, Atiya Adelita, PA, 10 mL at 05/04/21 0920    diazePAM (VALIUM) tablet 5 mg, 5 mg, Oral, Q6H PRN, Atiya Adelita, PA, 5 mg at 05/03/21 2335    sodium chloride flush 0.9 % injection 5-40 mL, 5-40 mL, Intravenous, PRN, Atiya Adelita, PA    0.9 % sodium chloride infusion, 25 mL, Intravenous, PRN, Atiya Adelita, PA    promethazine (PHENERGAN) tablet 12.5 mg, 12.5 mg, Oral, Q6H PRN **OR** ondansetron (ZOFRAN) injection 4 mg, 4 mg, Intravenous, Q6H PRN, Atiya Adelita, PA    0.9 % sodium chloride infusion, , Intravenous, Continuous, Atiya Adelita, PA, Last Rate: 100 mL/hr at 05/04/21 0946, New Bag at 05/04/21 0946    oxyCODONE (OXYCONTIN) extended release tablet 10 mg, 10 mg, Oral, 2 times per day, Regulo Andrews MD, 10 mg at 05/04/21 0930    sennosides-docusate sodium (SENOKOT-S) 8.6-50 MG tablet 1 tablet, 1 tablet, Oral, BID, Atiya Adelita, PA, 1 tablet at 05/03/21 2030    polyethylene glycol (GLYCOLAX) packet 17 g, 17 g, Oral, BID, Atiya Adelita, PA, 17 g at 05/03/21 0939    oxyCODONE (ROXICODONE) immediate release tablet 5 mg, 5 mg, Oral, Q4H PRN **OR** oxyCODONE HCl (OXY-IR) immediate release tablet 10 mg, 10 mg, Oral, Q4H PRN, Atiya Adelita, PA, 10 mg at 05/04/21 0514    nicotine (NICODERM CQ) 21 MG/24HR 1 patch, 1 patch, Transdermal, Daily, Atiya Adelita, PA, 1 patch at 05/04/21 0932    acetaminophen (TYLENOL) tablet 1,000 mg, 1,000 mg, Oral, TID, Atiya Adelita, PA, 1,000 mg at 05/04/21 0934    lamoTRIgine (LAMICTAL) tablet 100 mg, 100 mg, Oral, BID, Atiya Adelita, PA, 100 mg at 05/04/21 0935    sodium chloride flush 0.9 % injection 10 mL, 10 mL, Intravenous, 2 times per day, BLESSING Herndon, 10 mL at 05/03/21 0946    sodium chloride flush 0.9 % injection 10 mL, 10 mL, Intravenous, PRN, [facility], PA, 10 mL at 05/03/21 1413    0.9 % sodium chloride infusion, 25 mL, Intravenous, PRN, [facility], [place]    potassium chloride (KLOR-CON M) extended release tablet 40 mEq, 40 mEq, Oral, PRN **OR** potassium bicarb-citric acid (EFFER-K) effervescent tablet 40 mEq, 40 mEq, Oral, PRN **OR** potassium chloride 10 mEq/100 mL IVPB (Peripheral Line), 10 mEq, Intravenous, PRN, Susie [facility], PA    magnesium hydroxide (MILK OF MAGNESIA) 400 MG/5ML suspension 30 mL, 30 mL, Oral, Daily PRN, Susie Salazar, PA    glucose (GLUTOSE) 40 % oral gel 15 g, 15 g, Oral, PRN, [facility], [place]    dextrose 50 % IV solution, 12.5 g, Intravenous, PRN, [facility], [place]    glucagon (rDNA) injection 1 mg, 1 mg, Intramuscular, PRN, [facility], [place]    dextrose 5 % solution, 100 mL/hr, Intravenous, PRN, [facility], PA    insulin lispro (HUMALOG) injection vial 0-6 Units, 0-6 Units, Subcutaneous, TID WC, [facility], PA, 1 Units at 05/04/21 0759    insulin lispro (HUMALOG) injection vial 0-3 Units, 0-3 Units, Subcutaneous, Nightly, [facility], PA, 1 Units at 05/03/21 2043    trimethobenzamide (TIGAN) injection 200 mg, 200 mg, Intramuscular, Q6H PRN, [facility], [place]    lisinopril (PRINIVIL;ZESTRIL) tablet 20 mg, 20 mg, Oral, Daily, [facility], PA, 20 mg at 05/04/21 5479    Assessment:    Principal Problem:    Burst fracture of twelfth thoracic vertebra (HCC)  Active Problems:    Hyponatremia    Diabetes mellitus (HCC)    Tobacco abuse    Adrenal nodule (HCC) - incidental    Spinal cord compression (HCC)    Acute thoracic back pain    Pathologic fracture    Spinal cord tumor  Resolved Problems:    * No resolved hospital problems.  *    61-year-old gentleman with pathologic T12 compression fracture s/p T12 Laminectomy and a large right renal mass; another mass in the kidney,possible adrenal gland lesion, likely a metastatic renal cell carcinoma. Plan:  -Urology following.  -Pathology pending. Likely metastatic disease to the thoracic spine. Lesion on the R kidney likely a cystic renal tumor. But wait for pathology to results. Possible biopsy of kidney, pending report and further imaging.  -Lovenox for prophylaxis. -Pain is controlled but worse with movement. -PET outpatient.  -We will continue to follow long.        Electronically signed by KARLA Andrews NP on 5/4/2021 at 11:58 AM

## 2021-05-04 NOTE — CARE COORDINATION
5/4/2021 social work transition of care planning  Awaiting further evals and recs to assist with transition of care planning. Sw will follow.   Electronically signed by Delon Osgood, LSW on 5/4/2021 at 3:22 PM
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
## 2021-05-04 NOTE — PROGRESS NOTES
Chief Complaint:  Chief Complaint   Patient presents with    Back Pain     middle back; \"feels like a knot\"    Leg Pain     both thighs     Burst fracture of twelfth thoracic vertebra (HCC)     Subjective:    Pain is a bit worse today, otherwise no new complaints. Legs still feel somewhat weak. Discussed w/Neurosurgery, this is felt to be normal post-op course. Objective:    BP (!) 175/89   Pulse 93   Temp 97.6 °F (36.4 °C) (Temporal)   Resp 20 Comment: 20  Ht 6' (1.829 m)   Wt 283 lb 2 oz (128.4 kg)   SpO2 96%   BMI 38.40 kg/m²     Current medications that patient is taking have been reviewed. General appearance: NAD, conversant  HEENT: AT/NC, MMM  Neck: FROM, supple  Lungs: Clear to auscultation, WOB normal  CV: RRR, no MRGs  Abdomen: Soft, non-tender; no masses or HSM, +BS  Extremities: No peripheral edema or digital cyanosis  Skin: He has some \"skin tags\" that upon further inspection seem unusually large with broader bases  Psych: Calm, cooperative  Neuro: Alert and interactive, face symmetric, moving all extremities, speech fluent.     Labs:  CBC with Differential:    Lab Results   Component Value Date    WBC 11.2 05/03/2021    RBC 4.91 05/03/2021    HGB 13.5 05/03/2021    HCT 42.0 05/03/2021     05/03/2021    MCV 85.5 05/03/2021    MCH 27.5 05/03/2021    MCHC 32.1 05/03/2021    RDW 12.7 05/03/2021    LYMPHOPCT 20.1 05/03/2021    MONOPCT 9.5 05/03/2021    BASOPCT 0.6 05/03/2021    MONOSABS 1.06 05/03/2021    LYMPHSABS 2.24 05/03/2021    EOSABS 0.32 05/03/2021    BASOSABS 0.07 05/03/2021     CMP:    Lab Results   Component Value Date     05/03/2021    K 4.0 05/03/2021    K 4.4 04/29/2021    CL 97 05/03/2021    CO2 26 05/03/2021    BUN 10 05/03/2021    CREATININE 0.5 05/03/2021    GFRAA >60 05/03/2021    LABGLOM >60 05/03/2021    GLUCOSE 128 05/03/2021    PROT 6.8 04/28/2021    LABALBU 3.5 04/28/2021    CALCIUM 8.9 05/03/2021    BILITOT <0.2 04/28/2021    ALKPHOS 76 04/28/2021    AST 10 04/28/2021    ALT 13 04/28/2021        Assessment/Plan:  Principal Problem:    Burst fracture of twelfth thoracic vertebra (HCC)  Active Problems:    Hyponatremia    Diabetes mellitus (HCC)    Tobacco abuse    Adrenal nodule (HCC) - incidental    Spinal cord compression (HCC)    Acute thoracic back pain    Pathologic fracture    Spinal cord tumor  Resolved Problems:    * No resolved hospital problems. *       Path still pending.   Will call lab today if I have time    Concern for RCC metastasis    Will need PET and likely further kidney imaging    Activity per Neurosurgery    Glucose well controlled    Pain management consult appreciated    Requires continued inpatient level of care   Augie Tavares    2:56 PM  5/4/2021

## 2021-05-04 NOTE — PLAN OF CARE
Problem: Pain:  Goal: Pain level will decrease  Description: Pain level will decrease  Outcome: Not Met This Shift  Goal: Control of acute pain  Description: Control of acute pain  Outcome: Ongoing     Problem: Falls - Risk of:  Goal: Will remain free from falls  Description: Will remain free from falls  Outcome: Met This Shift  Goal: Absence of physical injury  Description: Absence of physical injury  Outcome: Met This Shift     Problem: Neurological  Goal: Maximum potential motor/sensory/cognitive function  Outcome: Ongoing

## 2021-05-04 NOTE — PROGRESS NOTES
Patient stated he was uncomfortable but refuses to turn for a assessment or to change pads/ straighten sheets underneath him. Says he will wait till physical therapy comes in the morning.

## 2021-05-04 NOTE — PROGRESS NOTES
Department of Neurosurgery  Progress Note    CHIEF COMPLAINT: s/p T12 kyphoplasty/laminectomy    SUBJECTIVE:  Continued op site pain. Did not participate with PT/OT yesterday. Seen by pain management. REVIEW OF SYSTEMS :  Constitutional: Negative for chills and fever. Neurological: Negative for dizziness, tremors and speech change.      OBJECTIVE:   VITALS:  BP (!) 175/89   Pulse 93   Temp 97.6 °F (36.4 °C) (Temporal)   Resp 20 Comment: 20  Ht 6' (1.829 m)   Wt 283 lb 2 oz (128.4 kg)   SpO2 96%   BMI 38.40 kg/m²   PHYSICAL:  CONSTITUTIONAL:  awake, alert, cooperative, no apparent distress, and appears stated age and FINE/FC    DATA:  CBC:   Lab Results   Component Value Date    WBC 11.2 05/03/2021    RBC 4.91 05/03/2021    HGB 13.5 05/03/2021    HCT 42.0 05/03/2021    MCV 85.5 05/03/2021    MCH 27.5 05/03/2021    MCHC 32.1 05/03/2021    RDW 12.7 05/03/2021     05/03/2021    MPV 10.0 05/03/2021     BMP:    Lab Results   Component Value Date     05/03/2021    K 4.0 05/03/2021    K 4.4 04/29/2021    CL 97 05/03/2021    CO2 26 05/03/2021    BUN 10 05/03/2021    LABALBU 3.5 04/28/2021    CREATININE 0.5 05/03/2021    CALCIUM 8.9 05/03/2021    GFRAA >60 05/03/2021    LABGLOM >60 05/03/2021    GLUCOSE 128 05/03/2021     PT/INR:    Lab Results   Component Value Date    PROTIME 12.2 04/30/2021    INR 1.1 04/30/2021     PTT:    Lab Results   Component Value Date    APTT 33.8 04/30/2021   [APTT}    Current Inpatient Medications  Current Facility-Administered Medications: enoxaparin (LOVENOX) injection 40 mg, 40 mg, Subcutaneous, Daily  bisacodyl (DULCOLAX) suppository 10 mg, 10 mg, Rectal, Daily PRN  HYDROmorphone (DILAUDID) injection 1 mg, 1 mg, Intravenous, Q4H PRN **OR** HYDROmorphone (DILAUDID) injection 0.5 mg, 0.5 mg, Intravenous, Q4H PRN  gabapentin (NEURONTIN) capsule 600 mg, 600 mg, Oral, TID  sodium chloride flush 0.9 % injection 5-40 mL, 5-40 mL, Intravenous, 2 times per day  diazePAM (VALIUM) Intramuscular, Q6H PRN  lisinopril (PRINIVIL;ZESTRIL) tablet 20 mg, 20 mg, Oral, Daily    ASSESSMENT:   · S/p T12 kyphoplasty and T12 laminectomy for tumor decompression on 4/30    PLAN:  · Await pathology results  · WBAT with TLSO  · Pain control. Seen by pain management.   · Suppository  · Start lovenox   · Oncology consulted      Electronically signed by Kristel Saenz PA-C on 5/4/2021 at 1:22 PM

## 2021-05-04 NOTE — PROGRESS NOTES
Occupational Therapy  OT BEDSIDE TREATMENT NOTE      Date:2021  Patient Name: Azael Munoz  MRN: [id number]  : 1965  Room: [address] [place] [address]     Evaluating OT: [address] OTR/L   Referring provider: BLESSING Kimble     AM-PAC Daily Activity Raw Score:   Recommended Adaptive Equipment: continue to assess     Diagnosis: Thoracic Back Pain   Surgery: S/p T12 kyphoplasty and T12 laminectomy for tumor decompression on     Pertinent Medical History: Arthritis, DM, CTS  Additional information: pt being re-evaluated s/p surgery      Precautions:  Falls, Soft TLSO,HOB < 45*, Spinal precautions      Home Living: Pt lives with wife in a 1 story home with no steps to enter and no HR, ramp to enter. .  Bathroom setup: tub/shower, std. Commodes, walk in shower in basement   Equipment owned: none     Prior Level of Function: Ind. with ADLs , Ind. with IADLs; ambulated no A.D. Driving: active  Occupation:      Pain Level: pt c/o severe bilateral leg pain  Cognition: A&O: 3; Follows 1-2 step directions              Memory: G              Sequencing: F              Problem solving: F              Judgement/safety: F-worse as pain gets worse                Functional Assessment:    Initial Eval Status  Date:  Treatment Status  Date:  Short Term Goals  Treatment frequency: 1-3x/week on PRN    Feeding Setup A  Set up     Grooming/Hygiene Mod I bed level Mod A  Set up seated supported in bed, Mod A seated EOB due to decreased sitting balance from pain   Mod I seated in chair       UB Dressing Mod A Max A  Min A to adjust gown supine. Dependent to don TLSO rolling side to side.   Mod I seated       LB Dressing DEP Max A  To don underwear, socks, and shoes supine and seated EOB   Mod A    Bathing Max A Max A  simulated      Toileting DEP Dependent  hygiene   Mod A   Bed Mobility  Supine to sit:  Max Ax2  Sit to supine: Max Ax2  max A x 2- supine<->sit  Educated pt on technique to increase independence.       Functional Transfers Sit to stand: Max Ax2  Stand to sit: Max Ax2  Assist of 3rd person d/t BLE buckling  Max A x 2  Per last tx  Mod A   Functional Mobility NT N/T   Mod A    Balance Sitting:      Static: Max A    Dynamic:Max A  Standing: Max Ax2  Sitting:      Static: Max A, Min A occasionally holding bedrail    Dynamic:Max A  Standing: N/T     Endurance/Activity Tolerance F-; limited by pain and weakness   Poor  Due to pain  F+     Visual/  Perceptual Glasses: No; WFL          UE strengthening     See UE Assessment Below   G tolerance  For BUE AROM/AAROM exercises in all planes to improve overall function for ADL tasks        Comments: Upon arrival pt supine in bed. Educated pt on spinal precautions, demonstrates poor understanding. Pt educated on adaptive techniques to increase independence and safety during ADL's, bed mobility, and functional transfers while maintaining precautions. At end of session pt left seated upright in bed, call light within reach. · Pt has made fair progress towards set goals.      · Continue with current plan of care    Treatment Time In: 2:15            Treatment Time Out: 2:40             Treatment Charges: Mins Units   Ther Ex  16390     Manual Therapy [phone]     Thera Activities 00748 10 1   ADL/Home Mgt [id number] 15 1   Neuro Re-ed [id number]     Group Therapy      Orthotic manage/training  [id number]     Non-Billable Time     Total Timed Treatment 25 2       Lorraine Morelos

## 2021-05-04 NOTE — PROGRESS NOTES
and S2 are rhythmic and regular. No murmurs appreciated. Abdomen: Positive bowel sounds to auscultation. Benign to palpation. No masses felt. No hepatosplenomegaly. Extremities: No clubbing, no cyanosis, no edema. Neurological: No focal motor or sensory loss however there is some weakness in lower extremities bilaterally  Lines: peripheral    Laboratory and Tests Review:  Lab Results   Component Value Date    WBC 11.2 05/03/2021    WBC 9.6 04/28/2021    WBC 8.9 04/27/2021    HGB 13.5 05/03/2021    HCT 42.0 05/03/2021    MCV 85.5 05/03/2021     05/03/2021     Lab Results   Component Value Date    NEUTROABS 7.43 (H) 05/03/2021    NEUTROABS 6.53 04/28/2021    NEUTROABS 4.00 04/27/2021     No results found for: [facility]  Lab Results   Component Value Date    ALT 13 04/28/2021    AST 10 04/28/2021    ALKPHOS 76 04/28/2021    BILITOT <0.2 04/28/2021     Lab Results   Component Value Date     05/03/2021    K 4.0 05/03/2021    K 4.4 04/29/2021    CL 97 05/03/2021    CO2 26 05/03/2021    BUN 10 05/03/2021    CREATININE 0.5 05/03/2021    CREATININE 0.6 04/29/2021    CREATININE 0.7 04/28/2021    GFRAA >60 05/03/2021    LABGLOM >60 05/03/2021    GLUCOSE 128 05/03/2021    PROT 6.8 04/28/2021    LABALBU 3.5 04/28/2021    CALCIUM 8.9 05/03/2021    BILITOT <0.2 04/28/2021    ALKPHOS 76 04/28/2021    AST 10 04/28/2021    ALT 13 04/28/2021     Lab Results   Component Value Date    CRP 1.0 (H) 04/28/2021    CRP 3.0 (H) 04/26/2021     Lab Results   Component Value Date    SEDRATE 57 (H) 04/28/2021    SEDRATE 68 (H) 04/26/2021     Radiology:  MRI  Acute to subacute superior endplate compression fracture of T12 with 40%   height loss and 5 mm retropulsion into the spinal canal.  There is associated   extensive anterior epidural hematoma at this level which contributes to   severe canal stenosis with cord compression at the level of T12 and moderate   bilateral neural foraminal narrowing at T11-T12 and T12-L1.      CT abdomen and pelvis -  Impression     Heterogeneous appearance of the right kidney with complex mass in the i   inferior pole concerning for necrotic malignancy.  Additional hypodense   lesions are also identified.  Differential consideration will also include   multifocal pyelonephritis with abscess and or malignancy.        Microbiology:   Lab Results   Component Value Date    BC 5 Days no growth 04/25/2021     Lab Results   Component Value Date    BLOODCULT2 5 Days no growth 04/25/2021     No results found for: WNDABS  No results found for: RESPSMEAR  No results found for: Tamela Arabia, LABLEGI, AFBCX, FUNGSM, LABFUNG  No results found for: CULTRESP  No results found for: CXCATHTIP  No results found for: BFCS  Culture Surgical   Date Value Ref Range Status   04/30/2021 Growth not present  Final     Urine Culture, Routine   Date Value Ref Range Status   04/25/2021 Growth not present  Final     No results found for: [address]      Assessment:    · T11-T12 compression fracture with spinal stenosis-s/p laminectomy ( 4/30) cx neg to date   · Renal cell with metastasis including epidural  ·        Plan:    · Watch off abx   · Follow cx, pathology   · Appreciated heme-onc consult        Electronically signed by Toy Jamil MD on 5/4/2021 at 6:12 PM

## 2021-05-05 ENCOUNTER — HOSPITAL ENCOUNTER (OUTPATIENT)
Dept: RADIATION ONCOLOGY | Age: 56
Discharge: HOME | End: 2021-05-05
Payer: COMMERCIAL

## 2021-05-05 ENCOUNTER — APPOINTMENT (OUTPATIENT)
Dept: ULTRASOUND IMAGING | Age: 56
DRG: 321 | End: 2021-05-05
Payer: COMMERCIAL

## 2021-05-05 DIAGNOSIS — C79.51 SECONDARY CANCER OF BONE (HCC): Primary | ICD-10-CM

## 2021-05-05 LAB
ANAEROBIC CULTURE: NORMAL
METER GLUCOSE: 145 MG/DL (ref 74–99)
METER GLUCOSE: 198 MG/DL (ref 74–99)
METER GLUCOSE: 208 MG/DL (ref 74–99)
METER GLUCOSE: 248 MG/DL (ref 74–99)

## 2021-05-05 PROCEDURE — 6360000002 HC RX W HCPCS: Performed by: PHYSICIAN ASSISTANT

## 2021-05-05 PROCEDURE — 2580000003 HC RX 258: Performed by: PHYSICIAN ASSISTANT

## 2021-05-05 PROCEDURE — 82962 GLUCOSE BLOOD TEST: CPT

## 2021-05-05 PROCEDURE — 6370000000 HC RX 637 (ALT 250 FOR IP): Performed by: INTERNAL MEDICINE

## 2021-05-05 PROCEDURE — 97530 THERAPEUTIC ACTIVITIES: CPT

## 2021-05-05 PROCEDURE — 6370000000 HC RX 637 (ALT 250 FOR IP): Performed by: PHYSICIAN ASSISTANT

## 2021-05-05 PROCEDURE — 93970 EXTREMITY STUDY: CPT

## 2021-05-05 PROCEDURE — 99999 PR OFFICE/OUTPT VISIT,PROCEDURE ONLY: CPT | Performed by: RADIOLOGY

## 2021-05-05 PROCEDURE — 93970 EXTREMITY STUDY: CPT | Performed by: RADIOLOGY

## 2021-05-05 PROCEDURE — 6360000002 HC RX W HCPCS: Performed by: INTERNAL MEDICINE

## 2021-05-05 PROCEDURE — 99254 IP/OBS CNSLTJ NEW/EST MOD 60: CPT | Performed by: PHYSICAL MEDICINE & REHABILITATION

## 2021-05-05 PROCEDURE — 1200000000 HC SEMI PRIVATE

## 2021-05-05 RX ORDER — OXYCODONE HCL 20 MG/1
20 TABLET, FILM COATED, EXTENDED RELEASE ORAL EVERY 12 HOURS SCHEDULED
Status: DISCONTINUED | OUTPATIENT
Start: 2021-05-05 | End: 2021-05-10

## 2021-05-05 RX ORDER — DULOXETIN HYDROCHLORIDE 30 MG/1
30 CAPSULE, DELAYED RELEASE ORAL DAILY
Status: DISCONTINUED | OUTPATIENT
Start: 2021-05-05 | End: 2021-05-14 | Stop reason: HOSPADM

## 2021-05-05 RX ORDER — OXYCODONE HYDROCHLORIDE 10 MG/1
10 TABLET ORAL EVERY 4 HOURS PRN
Status: DISCONTINUED | OUTPATIENT
Start: 2021-05-05 | End: 2021-05-14 | Stop reason: HOSPADM

## 2021-05-05 RX ORDER — PREGABALIN 150 MG/1
150 CAPSULE ORAL 2 TIMES DAILY
Status: DISCONTINUED | OUTPATIENT
Start: 2021-05-05 | End: 2021-05-14 | Stop reason: HOSPADM

## 2021-05-05 RX ADMIN — ENOXAPARIN SODIUM 40 MG: 40 INJECTION SUBCUTANEOUS at 09:24

## 2021-05-05 RX ADMIN — ACETAMINOPHEN 1000 MG: 500 TABLET, FILM COATED ORAL at 20:17

## 2021-05-05 RX ADMIN — OXYCODONE HYDROCHLORIDE 10 MG: 10 TABLET ORAL at 23:02

## 2021-05-05 RX ADMIN — LISINOPRIL 20 MG: 20 TABLET ORAL at 09:24

## 2021-05-05 RX ADMIN — LAMOTRIGINE 100 MG: 100 TABLET ORAL at 09:24

## 2021-05-05 RX ADMIN — INSULIN LISPRO 2 UNITS: 100 INJECTION, SOLUTION INTRAVENOUS; SUBCUTANEOUS at 14:19

## 2021-05-05 RX ADMIN — DULOXETINE HYDROCHLORIDE 30 MG: 30 CAPSULE, DELAYED RELEASE ORAL at 12:31

## 2021-05-05 RX ADMIN — INSULIN LISPRO 1 UNITS: 100 INJECTION, SOLUTION INTRAVENOUS; SUBCUTANEOUS at 20:20

## 2021-05-05 RX ADMIN — PREGABALIN 150 MG: 150 CAPSULE ORAL at 21:26

## 2021-05-05 RX ADMIN — HYDROMORPHONE HYDROCHLORIDE 1 MG: 1 INJECTION, SOLUTION INTRAMUSCULAR; INTRAVENOUS; SUBCUTANEOUS at 09:24

## 2021-05-05 RX ADMIN — OXYCODONE HYDROCHLORIDE 10 MG: 10 TABLET ORAL at 18:13

## 2021-05-05 RX ADMIN — INSULIN LISPRO 2 UNITS: 100 INJECTION, SOLUTION INTRAVENOUS; SUBCUTANEOUS at 09:28

## 2021-05-05 RX ADMIN — Medication 10 ML: at 09:26

## 2021-05-05 RX ADMIN — OXYCODONE HYDROCHLORIDE 20 MG: 20 TABLET, FILM COATED, EXTENDED RELEASE ORAL at 09:23

## 2021-05-05 RX ADMIN — INSULIN LISPRO 2 UNITS: 100 INJECTION, SOLUTION INTRAVENOUS; SUBCUTANEOUS at 18:08

## 2021-05-05 RX ADMIN — ACETAMINOPHEN 1000 MG: 500 TABLET, FILM COATED ORAL at 12:31

## 2021-05-05 RX ADMIN — METFORMIN HYDROCHLORIDE 500 MG: 500 TABLET ORAL at 18:08

## 2021-05-05 RX ADMIN — SENNOSIDES AND DOCUSATE SODIUM 1 TABLET: 8.6; 5 TABLET ORAL at 20:17

## 2021-05-05 RX ADMIN — SODIUM CHLORIDE: 9 INJECTION, SOLUTION INTRAVENOUS at 04:06

## 2021-05-05 RX ADMIN — OXYCODONE HYDROCHLORIDE 10 MG: 10 TABLET ORAL at 14:14

## 2021-05-05 RX ADMIN — DIAZEPAM 5 MG: 5 TABLET ORAL at 05:47

## 2021-05-05 RX ADMIN — LAMOTRIGINE 100 MG: 100 TABLET ORAL at 20:17

## 2021-05-05 RX ADMIN — DIAZEPAM 5 MG: 5 TABLET ORAL at 18:12

## 2021-05-05 RX ADMIN — HYDROMORPHONE HYDROCHLORIDE 1 MG: 1 INJECTION, SOLUTION INTRAMUSCULAR; INTRAVENOUS; SUBCUTANEOUS at 04:04

## 2021-05-05 RX ADMIN — HYDROMORPHONE HYDROCHLORIDE 1 MG: 1 INJECTION, SOLUTION INTRAMUSCULAR; INTRAVENOUS; SUBCUTANEOUS at 21:26

## 2021-05-05 RX ADMIN — OXYCODONE HYDROCHLORIDE 10 MG: 10 TABLET ORAL at 07:12

## 2021-05-05 RX ADMIN — OXYCODONE HYDROCHLORIDE 10 MG: 10 TABLET ORAL at 02:56

## 2021-05-05 RX ADMIN — HYDROMORPHONE HYDROCHLORIDE 1 MG: 1 INJECTION, SOLUTION INTRAMUSCULAR; INTRAVENOUS; SUBCUTANEOUS at 16:39

## 2021-05-05 RX ADMIN — PREGABALIN 150 MG: 150 CAPSULE ORAL at 12:32

## 2021-05-05 RX ADMIN — OXYCODONE HYDROCHLORIDE 20 MG: 20 TABLET, FILM COATED, EXTENDED RELEASE ORAL at 20:18

## 2021-05-05 RX ADMIN — ACETAMINOPHEN 1000 MG: 500 TABLET, FILM COATED ORAL at 09:25

## 2021-05-05 RX ADMIN — DIAZEPAM 5 MG: 5 TABLET ORAL at 12:32

## 2021-05-05 ASSESSMENT — PAIN DESCRIPTION - DESCRIPTORS
DESCRIPTORS: ACHING;CONSTANT;DISCOMFORT

## 2021-05-05 ASSESSMENT — PAIN SCALES - GENERAL
PAINLEVEL_OUTOF10: 7
PAINLEVEL_OUTOF10: 7
PAINLEVEL_OUTOF10: 9
PAINLEVEL_OUTOF10: 0
PAINLEVEL_OUTOF10: 8
PAINLEVEL_OUTOF10: 9
PAINLEVEL_OUTOF10: 10
PAINLEVEL_OUTOF10: 9
PAINLEVEL_OUTOF10: 9
PAINLEVEL_OUTOF10: 10
PAINLEVEL_OUTOF10: 0
PAINLEVEL_OUTOF10: 7
PAINLEVEL_OUTOF10: 5
PAINLEVEL_OUTOF10: 10

## 2021-05-05 ASSESSMENT — PAIN DESCRIPTION - FREQUENCY
FREQUENCY: CONTINUOUS

## 2021-05-05 ASSESSMENT — PAIN DESCRIPTION - PAIN TYPE
TYPE: SURGICAL PAIN

## 2021-05-05 ASSESSMENT — PAIN DESCRIPTION - ONSET
ONSET: ON-GOING

## 2021-05-05 ASSESSMENT — PAIN DESCRIPTION - ORIENTATION
ORIENTATION: MID

## 2021-05-05 ASSESSMENT — PAIN DESCRIPTION - LOCATION
LOCATION: BACK

## 2021-05-05 NOTE — PROGRESS NOTES
Left a message on the voice mail of Dr. Lai Gamble office, notifying them of consult, awaiting reply.

## 2021-05-05 NOTE — PROGRESS NOTES
Department of Neurosurgery  Progress Note    CHIEF COMPLAINT: s/p T12 kyphoplasty/laminectomy    SUBJECTIVE:  Minimal op site pain. C/o severe bilateral anterior thigh pain    REVIEW OF SYSTEMS :  Constitutional: Negative for chills and fever. Neurological: Negative for dizziness, tremors and speech change. OBJECTIVE:   VITALS:  BP (!) 150/86   Pulse 95   Temp 97.5 °F (36.4 °C) (Infrared)   Resp 18   Ht 6' (1.829 m)   Wt 283 lb 2 oz (128.4 kg)   SpO2 92%   BMI 38.40 kg/m²   PHYSICAL:  CONSTITUTIONAL:  awake, alert, cooperative, no apparent distress, and appears stated age and FINE/FC.   3/5 iliopsoas and quad strength    DATA:  CBC:   Lab Results   Component Value Date    WBC 11.2 05/03/2021    RBC 4.91 05/03/2021    HGB 13.5 05/03/2021    HCT 42.0 05/03/2021    MCV 85.5 05/03/2021    MCH 27.5 05/03/2021    MCHC 32.1 05/03/2021    RDW 12.7 05/03/2021     05/03/2021    MPV 10.0 05/03/2021     BMP:    Lab Results   Component Value Date     05/03/2021    K 4.0 05/03/2021    K 4.4 04/29/2021    CL 97 05/03/2021    CO2 26 05/03/2021    BUN 10 05/03/2021    LABALBU 3.5 04/28/2021    CREATININE 0.5 05/03/2021    CALCIUM 8.9 05/03/2021    GFRAA >60 05/03/2021    LABGLOM >60 05/03/2021    GLUCOSE 128 05/03/2021     PT/INR:    Lab Results   Component Value Date    PROTIME 12.2 04/30/2021    INR 1.1 04/30/2021     PTT:    Lab Results   Component Value Date    APTT 33.8 04/30/2021   [APTT}    Current Inpatient Medications  Current Facility-Administered Medications: pregabalin (LYRICA) capsule 150 mg, 150 mg, Oral, BID  DULoxetine (CYMBALTA) extended release capsule 30 mg, 30 mg, Oral, Daily  oxyCODONE (OXYCONTIN) extended release tablet 20 mg, 20 mg, Oral, 2 times per day  [DISCONTINUED] oxyCODONE (ROXICODONE) immediate release tablet 5 mg, 5 mg, Oral, Q4H PRN **OR** oxyCODONE HCl (OXY-IR) immediate release tablet 10 mg, 10 mg, Oral, Q4H PRN  enoxaparin (LOVENOX) injection 40 mg, 40 mg, Subcutaneous, Errol Peon) injection 200 mg, 200 mg, Intramuscular, Q6H PRN  lisinopril (PRINIVIL;ZESTRIL) tablet 20 mg, 20 mg, Oral, Daily    ASSESSMENT:   · S/p T12 kyphoplasty and T12 laminectomy for tumor decompression on 4/30    PLAN:  · Await pathology results  · WBAT with TLSO  · Pain control. Seen by pain management.   · lovenox   · Oncology consulted      Electronically signed by BLESSING Benites on 5/5/2021 at 11:32 AM

## 2021-05-05 NOTE — PROGRESS NOTES
Spoke with path lab, biopsy consistent with renal origin, and the report should be back later this afternoon.

## 2021-05-05 NOTE — PROGRESS NOTES
Patient says he is in a lot of pain but has maxed out all his med options. Patient refuses to let us reposition him.

## 2021-05-05 NOTE — PLAN OF CARE
Problem: Falls - Risk of:  Goal: Will remain free from falls  Description: Will remain free from falls  5/5/2021 0052 by Lester Paz RN  Outcome: Met This Shift  5/4/2021 1856 by Celestina Merlin, RN  Outcome: Met This Shift  Goal: Absence of physical injury  Description: Absence of physical injury  5/5/2021 0052 by Lester Paz RN  Outcome: Met This Shift  5/4/2021 1856 by Celestina Merlin, RN  Outcome: Met This Shift     Problem: Skin Integrity:  Goal: Will show no infection signs and symptoms  Description: Will show no infection signs and symptoms  Outcome: Met This Shift  Goal: Absence of new skin breakdown  Description: Absence of new skin breakdown  Outcome: Met This Shift     Problem: Pain:  Goal: Pain level will decrease  Description: Pain level will decrease  5/5/2021 0052 by Lester Paz RN  Outcome: Ongoing  5/4/2021 1856 by Celestina Merlin, RN  Outcome: Not Met This Shift  Goal: Control of acute pain  Description: Control of acute pain  5/5/2021 0052 by Lester Paz RN  Outcome: Ongoing  5/4/2021 1856 by Celestina Merlin, RN  Outcome: Ongoing  Goal: Control of chronic pain  Description: Control of chronic pain  Outcome: Ongoing     Problem: Neurological  Goal: Maximum potential motor/sensory/cognitive function  5/4/2021 1856 by Celestina Merlin, RN  Outcome: Ongoing

## 2021-05-05 NOTE — PROGRESS NOTES
[facility] PROGRESS NOTE                                                                       5/5/2021        CHIEF UROLOGIC COMPLAINT: Metastatic renal cell carcinoma    HISTORY OF PRESENT ILLNESS:  Patient without new complaints. Patient complains of back pain. REVIEW OF SYSTEMS:   CONSTITUTIONAL: negative  HEENT: negative  HEMATOLOGIC: negative  ENDOCRINE: negative  RESPIRATORY: negative  CV: negative  GI: negative  NEURO: negative  ORTHOPEDICS: negative  PSYCHIATRIC: negative  : as above    PAST FAMILY HISTORY:  History reviewed. No pertinent family history.   PAST SOCIAL HISTORY:    Social History     Socioeconomic History    Marital status:      Spouse name: None    Number of children: None    Years of education: None    Highest education level: None   Occupational History    None   Social Needs    Financial resource strain: None    Food insecurity     Worry: None     Inability: None    Transportation needs     Medical: None     Non-medical: None   Tobacco Use    Smoking status: Current Every Day Smoker     Packs/day: 0.25     Years: 22.00     Pack years: 5.50    Smokeless tobacco: Never Used   Substance and Sexual Activity    Alcohol use: No    Drug use: No    Sexual activity: None   Lifestyle    Physical activity     Days per week: None     Minutes per session: None    Stress: None   Relationships    Social connections     Talks on phone: None     Gets together: None     Attends Jewish service: None     Active member of club or organization: None     Attends meetings of clubs or organizations: None     Relationship status: None    Intimate partner violence     Fear of current or ex partner: None     Emotionally abused: None     Physically abused: None     Forced sexual activity: None   Other Topics Concern    None   Social History Narrative    None Scheduled Meds:   pregabalin  150 mg Oral BID    DULoxetine  30 mg Oral Daily    oxyCODONE  20 mg Oral 2 times per day    metFORMIN  500 mg Oral BID WC    enoxaparin  40 mg Subcutaneous Daily    sodium chloride flush  5-40 mL Intravenous 2 times per day    sennosides-docusate sodium  1 tablet Oral BID    polyethylene glycol  17 g Oral BID    nicotine  1 patch Transdermal Daily    acetaminophen  1,000 mg Oral TID    lamoTRIgine  100 mg Oral BID    sodium chloride flush  10 mL Intravenous 2 times per day    insulin lispro  0-6 Units Subcutaneous TID WC    insulin lispro  0-3 Units Subcutaneous Nightly    lisinopril  20 mg Oral Daily     Continuous Infusions:   sodium chloride      sodium chloride      dextrose       PRN Meds:.[DISCONTINUED] oxyCODONE **OR** oxyCODONE, bisacodyl, HYDROmorphone **OR** [DISCONTINUED] HYDROmorphone, diazePAM, sodium chloride flush, sodium chloride, promethazine **OR** ondansetron, sodium chloride flush, sodium chloride, potassium chloride **OR** potassium alternative oral replacement **OR** potassium chloride, magnesium hydroxide, glucose, dextrose, glucagon (rDNA), dextrose, trimethobenzamide    BP (!) 187/90   Pulse 90   Temp 98.7 °F (37.1 °C) (Infrared)   Resp 18   Ht 6' (1.829 m)   Wt 283 lb 2 oz (128.4 kg)   SpO2 95%   BMI 38.40 kg/m²     Lab Results   Component Value Date    WBC 11.2 05/03/2021    HGB 13.5 05/03/2021    HCT 42.0 05/03/2021    MCV 85.5 05/03/2021     05/03/2021       Lab Results   Component Value Date    CREATININE 0.5 (L) 05/03/2021       Lab Results   Component Value Date    PSA 1.44 04/29/2021       Lab Results   Component Value Date    LABURIN Growth not present 04/25/2021       Lab Results   Component Value Date    BC 5 Days no growth 04/25/2021       Lab Results   Component Value Date    BLOODCULT2 5 Days no growth 04/25/2021       PHYSICAL EXAMINATION:  Skin dry, without rashes  Respirations non-labored, intact  Abdomen soft, non-tender, non-distended, obese  Alert and oriented x3        ASSESSMENT AND PLAN:  1. 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 I discussed with Ita Nair the poor prognosis associated with this and my recommendation that he see a specialist at the [facility] [place] possibly to enroll in a trial.  He is concerned about his lack of healthcare insurance and we will have social work work with him to apply for government assistance if needed. Unsure if there is a role for nephrectomy at this point but in my opinion the metastatic lesion to the spine is more worrisome than the mass in his kidney.     Lupe Schafer M.D.  5/5/2021  4:29 PM

## 2021-05-05 NOTE — PROGRESS NOTES
Occupational Therapy  OT BEDSIDE TREATMENT NOTE      Date:2021  Patient Name: Kris Valera  MRN: [id number]  : 1965  Room: [address] [place] [address]     Evaluating OT: SAMUEL Kennedy/L   Referring provider: BLESSING Zarate     AM-[facility] Daily Activity Raw Score:   Recommended Adaptive Equipment: continue to assess     Diagnosis: Thoracic Back Pain   Surgery: S/p T12 kyphoplasty and T12 laminectomy for tumor decompression on     Pertinent Medical History: Arthritis, DM, CTS  Additional information: pt being re-evaluated s/p surgery      Precautions:  Falls, Soft TLSO,HOB < 45*, Spinal precautions      Home Living: Pt lives with wife in a 1 story home with no steps to enter and no HR, ramp to enter. .  Bathroom setup: tub/shower, std. Commodes, walk in shower in basement   Equipment owned: none     Prior Level of Function: Ind. with ADLs , Ind. with IADLs; ambulated no A.D. Driving: active  Occupation:      Pain Level: pt c/o 10/10 BLE pain  Cognition: A&O: 3; Follows 1-2 step directions              Memory: G              Sequencing: F              Problem solving: F              Judgement/safety: F-                Functional Assessment:    Initial Eval Status  Date:  Treatment Status  Date:  Short Term Goals  Treatment frequency: 1-3x/week on PRN    Feeding Setup A  Set up     Grooming/Hygiene Mod I bed level Set up seated    Mod I seated in chair       UB Dressing Mod A Max A  Per last tx  Reassembled TLSO due to pieces removed when pt doffed prior day   Mod I seated       LB Dressing DEP Max A  To don shorts supine in bed. Educated on technique.   Mod A    Bathing Max A Max A  simulated      Toileting DEP Dependent  hygiene   Mod A   Bed Mobility  Supine to sit: Max Ax2  Sit to supine: Max Ax2  max A- rolling       Functional Transfers Sit to stand: Max Ax2  Stand to sit:  Max Ax2  Assist of 3rd person d/t BLE buckling  Max A x 2  Per last tx  Mod A   Functional Mobility NT N/T   Mod A Balance Sitting:      Static: Max A    Dynamic:Max A  Standing: Max Ax2  Sitting:      Static: per last tx    Dynamic: N/T  Standing: N/T     Endurance/Activity Tolerance F-; limited by pain and weakness   Poor  Declined further therapy when preparing to don TLSO due to pain  F+     Visual/  Perceptual Glasses: No; WFL          UE strengthening     See UE Assessment Below   G tolerance  For BUE AROM/AAROM exercises in all planes to improve overall function for ADL tasks        Comments: Upon arrival pt supine in bed. Educated pt on spinal precautions, demonstrates poor understanding. Pt educated on adaptive techniques to increase independence and safety during ADL's and bed mobility while maintaining precautions. At end of session pt left seated upright in bed, call light within reach. · Pt has made fair progress towards set goals.      · Continue with current plan of care    Treatment Time In: 12:00           Treatment Time Out: 12:15             Treatment Charges: Mins Units   Ther Ex  27838     Manual Therapy 77990     Thera Activities 25003     ADL/Home Mgt 10511 15 1   Neuro Re-ed [id number]     Group Therapy      Orthotic manage/training  16859     Non-Billable Time     Total Timed Treatment 15 1       Alice 162, Lorraine 86

## 2021-05-05 NOTE — PROGRESS NOTES
Chief Complaint:  Chief Complaint   Patient presents with    Back Pain     middle back; \"feels like a knot\"    Leg Pain     both thighs     Burst fracture of twelfth thoracic vertebra (HCC)     Subjective:    He is totally perseverating in what can only be described as a non-productive manner. He is repetitively asking why his legs still hurt (primarily in the bilateral thighs), and I kept explaining to him he had cord compression and then a pretty big surgery, and that neurosurgery had told me his symptoms are normal post-op course. He has not been working well with PT. Apparently he refused to talk to the [employer] benefits service to discuss his insurance woes a few days ago (WHY? WHY?). He tells me that he had two uncles who had DVT's, so he's very concerned that he has DVT because of his leg pain. He doesn't think he feels any weaker and no numbness of the legs, but does have difficulty walking due to some degree of weakness as well as pain. Objective:    BP (!) 150/86   Pulse 95   Temp 97.5 °F (36.4 °C) (Infrared)   Resp 18   Ht 6' (1.829 m)   Wt 283 lb 2 oz (128.4 kg)   SpO2 92%   BMI 38.40 kg/m²     Current medications that patient is taking have been reviewed. General appearance: NAD, conversant  HEENT: AT/NC, MMM  Neck: FROM, supple  Lungs: Clear to auscultation, WOB normal  CV: RRR, no MRGs  Abdomen: Soft, non-tender; no masses or HSM, +BS  Extremities: No peripheral edema or digital cyanosis. FROM without synovitis or deformity of b/l LE's  Skin: No significant lesions  Psych: Anxious and hyperverbal  Neuro: Alert and interactive, face symmetric, moving all extremities, speech fluent. Hip flexors at least 4/5 bl/.   Foot plantarflexors and dorsiflexors 5/5 b/l    Labs:  CBC with Differential:    Lab Results   Component Value Date    WBC 11.2 05/03/2021    RBC 4.91 05/03/2021    HGB 13.5 05/03/2021    HCT 42.0 05/03/2021     05/03/2021    MCV 85.5 05/03/2021    MCH 27.5 05/03/2021    MCHC 32.1 05/03/2021    RDW 12.7 05/03/2021    LYMPHOPCT 20.1 05/03/2021    MONOPCT 9.5 05/03/2021    BASOPCT 0.6 05/03/2021    MONOSABS 1.06 05/03/2021    LYMPHSABS 2.24 05/03/2021    EOSABS 0.32 05/03/2021    BASOSABS 0.07 05/03/2021     CMP:    Lab Results   Component Value Date     05/03/2021    K 4.0 05/03/2021    K 4.4 04/29/2021    CL 97 05/03/2021    CO2 26 05/03/2021    BUN 10 05/03/2021    CREATININE 0.5 05/03/2021    GFRAA >60 05/03/2021    LABGLOM >60 05/03/2021    GLUCOSE 128 05/03/2021    PROT 6.8 04/28/2021    LABALBU 3.5 04/28/2021    CALCIUM 8.9 05/03/2021    BILITOT <0.2 04/28/2021    ALKPHOS 76 04/28/2021    AST 10 04/28/2021    ALT 13 04/28/2021        Assessment/Plan:  Principal Problem:    Burst fracture of twelfth thoracic vertebra (HCC)  Active Problems:    Hyponatremia    Diabetes mellitus (HCC)    Tobacco abuse    Adrenal nodule (HCC) - incidental    Spinal cord compression (HCC)    Acute thoracic back pain    Pathologic fracture    Spinal cord tumor  Resolved Problems:    * No resolved hospital problems. 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 Will await plan from heme/onc and urology    Rad onc consult as well - whether the spine needs radiation    PT    Asked public benefits folks to talk to him again    Glucose well controlled    Will do DVT US because he is really perseverating on his uncles having had DVT's    Pain management consult appreciated    Increased oxycontin dose and made other changes recommended by pain service    Glucose a bit high. Start metformin. There is a 24 Oz Pepsi at bedside. I encouraged him not to drink that.     Requires continued inpatient level of care   Yared Morocho    2:23 PM  5/5/2021

## 2021-05-05 NOTE — PROGRESS NOTES
Subjective:  Patient alert sitting in the bed. I discussed pathology with patient and treatment plan. Patient wants to be able to be discharged from the hospital and obtain a job before he wants to commit to anything. He is tearful in conversation. There was a lot of concern regarding this. Otherwise, he feels okay. No chest pain or SOB. Pain is controlled. Objective:    BP (!) 187/90   Pulse 90   Temp 98.7 °F (37.1 °C) (Infrared)   Resp 18   Ht 6' (1.829 m)   Wt 283 lb 2 oz (128.4 kg)   SpO2 95%   BMI 38.40 kg/m²     General: NAD, obese. HEENT: No thrush or mucositis, EOMI, PERRLA  Heart:  RRR, no murmurs, gallops, or rubs.   Lungs:  CTA bilaterally, no wheeze, rales or rhonchi  Abd: bowel sounds present, nontender, nondistended, no masses  Extrem:  No clubbing, cyanosis, or edema  Lymphatics: No palpable adenopathy in cervical and supraclavicular regions  Skin: Intact, no petechia or purpura    CBC with Differential:    Lab Results   Component Value Date    WBC 11.2 05/03/2021    RBC 4.91 05/03/2021    HGB 13.5 05/03/2021    HCT 42.0 05/03/2021     05/03/2021    MCV 85.5 05/03/2021    MCH 27.5 05/03/2021    MCHC 32.1 05/03/2021    RDW 12.7 05/03/2021    LYMPHOPCT 20.1 05/03/2021    MONOPCT 9.5 05/03/2021    BASOPCT 0.6 05/03/2021    MONOSABS 1.06 05/03/2021    LYMPHSABS 2.24 05/03/2021    EOSABS 0.32 05/03/2021    BASOSABS 0.07 05/03/2021     CMP:    Lab Results   Component Value Date     05/03/2021    K 4.0 05/03/2021    K 4.4 04/29/2021    CL 97 05/03/2021    CO2 26 05/03/2021    BUN 10 05/03/2021    CREATININE 0.5 05/03/2021    GFRAA >60 05/03/2021    LABGLOM >60 05/03/2021    GLUCOSE 128 05/03/2021    PROT 6.8 04/28/2021    LABALBU 3.5 04/28/2021    CALCIUM 8.9 05/03/2021    BILITOT <0.2 04/28/2021    ALKPHOS 76 04/28/2021    AST 10 04/28/2021    ALT 13 04/28/2021         Current Facility-Administered Medications:     pregabalin (LYRICA) capsule 150 mg, 150 mg, Oral, BID, Louie SILVESTRE Virgie Fowler MD, 150 mg at 05/05/21 1232    DULoxetine (CYMBALTA) extended release capsule 30 mg, 30 mg, Oral, Daily, Danna Cruz MD, 30 mg at 05/05/21 1231    oxyCODONE (OXYCONTIN) extended release tablet 20 mg, 20 mg, Oral, 2 times per day, Danna Cruz MD, 20 mg at 05/05/21 9364    [DISCONTINUED] oxyCODONE (ROXICODONE) immediate release tablet 5 mg, 5 mg, Oral, Q4H PRN **OR** oxyCODONE HCl (OXY-IR) immediate release tablet 10 mg, 10 mg, Oral, Q4H PRN, Danna Cruz MD, 10 mg at 05/05/21 1414    metFORMIN (GLUCOPHAGE) tablet 500 mg, 500 mg, Oral, BID , Louie Jacob MD    enoxaparin (LOVENOX) injection 40 mg, 40 mg, Subcutaneous, Daily, Ashlyn Hudson PA-C, 40 mg at 05/05/21 9618    bisacodyl (DULCOLAX) suppository 10 mg, 10 mg, Rectal, Daily PRN, BLESSING Carpio    HYDROmorphone (DILAUDID) injection 1 mg, 1 mg, Intravenous, Q4H PRN, 1 mg at 05/05/21 0924 **OR** [DISCONTINUED] HYDROmorphone (DILAUDID) injection 0.5 mg, 0.5 mg, Intravenous, Q4H PRN, Danna Cruz MD    sodium chloride flush 0.9 % injection 5-40 mL, 5-40 mL, Intravenous, 2 times per day, BLESSING Carpio, 10 mL at 05/04/21 0920    diazePAM (VALIUM) tablet 5 mg, 5 mg, Oral, Q6H PRN, BLESSING Carpio, 5 mg at 05/05/21 1232    sodium chloride flush 0.9 % injection 5-40 mL, 5-40 mL, Intravenous, PRN, BLESSING Carpio    0.9 % sodium chloride infusion, 25 mL, Intravenous, PRN, BLESSING aCrpio    promethazine (PHENERGAN) tablet 12.5 mg, 12.5 mg, Oral, Q6H PRN **OR** ondansetron (ZOFRAN) injection 4 mg, 4 mg, Intravenous, Q6H PRN, BLESSING Carpio    sennosides-docusate sodium (SENOKOT-S) 8.6-50 MG tablet 1 tablet, 1 tablet, Oral, BID, BLESSING Carpio, 1 tablet at 05/04/21 2121    polyethylene glycol (GLYCOLAX) packet 17 g, 17 g, Oral, BID, BLESSING Carpio, 17 g at 05/03/21 0939    nicotine (NICODERM CQ) 21 MG/24HR 1 patch, 1 patch, Transdermal, Daily, BLESSING Carpio, 1 patch at 05/05/21 0977   acetaminophen (TYLENOL) tablet 1,000 mg, 1,000 mg, Oral, TID, Sharren Speed, PA, 1,000 mg at 05/05/21 1231    lamoTRIgine (LAMICTAL) tablet 100 mg, 100 mg, Oral, BID, BLESSING Palacios, 100 mg at 05/05/21 5110    sodium chloride flush 0.9 % injection 10 mL, 10 mL, Intravenous, 2 times per day, BLESSING Palacios, 10 mL at 05/05/21 0926    sodium chloride flush 0.9 % injection 10 mL, 10 mL, Intravenous, PRN, Micheline Sandhu, PA, 10 mL at 05/04/21 1801    0.9 % sodium chloride infusion, 25 mL, Intravenous, PRN, Micheline Sandhu PA    potassium chloride (KLOR-CON M) extended release tablet 40 mEq, 40 mEq, Oral, PRN **OR** potassium bicarb-citric acid (EFFER-K) effervescent tablet 40 mEq, 40 mEq, Oral, PRN **OR** potassium chloride 10 mEq/100 mL IVPB (Peripheral Line), 10 mEq, Intravenous, PRN, BLESSING Palacios    magnesium hydroxide (MILK OF MAGNESIA) 400 MG/5ML suspension 30 mL, 30 mL, Oral, Daily PRN, BLESSING Palacios    glucose (GLUTOSE) 40 % oral gel 15 g, 15 g, Oral, PRN, BLESSING Palacios    dextrose 50 % IV solution, 12.5 g, Intravenous, PRN, Micheline Sandhu PA    glucagon (rDNA) injection 1 mg, 1 mg, Intramuscular, PRN, BLESSING Palacios    dextrose 5 % solution, 100 mL/hr, Intravenous, PRN, BLESSING Palacios    insulin lispro (HUMALOG) injection vial 0-6 Units, 0-6 Units, Subcutaneous, TID WC, BLESSING Palacios, 2 Units at 05/05/21 1419    insulin lispro (HUMALOG) injection vial 0-3 Units, 0-3 Units, Subcutaneous, Nightly, BLESSING Palacios, 1 Units at 05/04/21 2131    trimethobenzamide (TIGAN) injection 200 mg, 200 mg, Intramuscular, Q6H PRN, BLESSING Palacios    lisinopril (PRINIVIL;ZESTRIL) tablet 20 mg, 20 mg, Oral, Daily, BLESSING Palacios, 20 mg at 05/05/21 7353    Assessment:    Principal Problem:    Burst fracture of twelfth thoracic vertebra [facility])  Active Problems:    Hyponatremia    Diabetes mellitus ([facility] [address]    Tobacco abuse    Adrenal nodule ([facility] [address] - incidental    Spinal cord compression ([facility])    Acute thoracic back pain    Pathologic fracture    Spinal cord tumor  Resolved Problems:    * No resolved hospital problems. 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 He will need a PET/CT outpatient.   -Recommending Radiation/Oncology to see patient which was consulted already for palliative radiation to the T12.   -Discussed with Urology- Joanne Hudson, unsure if nephrectomy would be beneficial at this moment. Recommend specialist at the [facility] and also involve social work to help assist patient.    -Lovenox for prophylaxis. -Pain is controlled  -Discussed with . Electronically signed by KARLA Nguyen NP on 5/5/2021 at 4:10 PM    Patient seen and examined. Agree with above assessment plan. Patient has metastatic renal cell carcinoma to T12 and possibly the adrenal gland. Recommend that he take care of himself and his health prior to trying to obtain a job. He will need social work involved to assist in obtaining health insurance. Recommend evaluation at [facility]. He will need palliative radiation to the T12 lesion. Confirm that there is no other metastatic disease of the bone scan. All of his questions were answered to his satisfaction.     Chapincito Hammonds MD

## 2021-05-05 NOTE — PROGRESS NOTES
Physical Therapy  Treatment Note     Name: Alonzo Rivers  : 1965  MRN: [id number]    Referring Provider:  BLESSING Hardin    Date of Service: 2021    Evaluating PT:  Elpidio Arizmendi PT, DPT [id number]    Room #:  9433/2533-B  Diagnosis:  Thoracic back pain - T12 compression fracture and epidural mass  Precautions: Falls, spinal precautions, soft TLSO  Procedure/Surgery:  B T12 laminectomy  PMHx/PSHx:  Arthritis, DM, HTN  Equipment Needs:  TBD    SUBJECTIVE:  Pt lives with spouse in a 1 story home with ramped entrance. Pt ambulated with no device independently PTA. OBJECTIVE:   Re-Evaluation  Date: 2021 nt Short Term/ Long Term   Goals   AM-PAC 6 Clicks      Was pt agreeable to Eval/treatment? Yes     Does pt have pain? Yes, pain increased with movement     Bed Mobility  Rolling: MaxA to R and L  Supine to sit: MaxA x 2 log roll  Sit to supine: MaxA x 2  Scooting: MaxA  Logan   Transfers Sit to stand: MaxA x 2  Stand to sit: MaxA x 2  Stand pivot: NT  Logan with AAD, if needed   Ambulation   NT  >75 feet with Logan with AAD, if needed   Stair negotiation: ascended and descended NT  >4 steps with 2 rail Logan   ROM BUE:  Defer to OT note  BLE:  WFL     Strength BUE:  Defer to OT note  BLE:  Unable to assess due to poor tolerance to upright activity   Increase by 1/3 MMT grade   Balance Sitting EOB:  MaxA  Dynamic Standing:  MaxA x 2 no device  Sitting EOB: Logan  Dynamic Standing:  Logan with AAD, if needed     Pt is A & O x 4  Sensation:  NT  Edema:  None noted    Therapeutic Exercises:       Patient education  Pt educated on safety, spinal precautions, TLSO fit and benefits of increasing activity. Patient response to education:   Pt verbalized understanding Pt demonstrated skill Pt requires further education in this area   yes yes yes     ASSESSMENT:    Comments:    Pt in bed upon arrival.  Pt reports pain in his legs. Pt not tolerating lying straight in bed.    Pt got some relief with knees flexed and feet flat on bed. Pt very talkative through out rx. Pt with more extension activity has increase pain. Did do brief rom on right leg and pt rolled to his left side with max assist.   After long discussion pt asked to take his pain meds at 130 and to check on him. Returned 125 and pt not available. Pt at a procedure. Will check on him later if able. Pt left in bed with hob < 45 degrees. Treatment:  Patient practiced and was instructed in the following treatment:     Bed mobility training - pt given verbal and tactile cues to facilitate proper sequencing and safety during rolling and supine>sit as well as provided with physical assistance to complete task    Sitting EOB for >5 minutes for upright tolerance, postural awareness and BLE ROM   Supine TE performed as noted above.  Patient education provided continuously throughout session with focus on correcting deviations or safety concerns observed throughout session. PLAN OF CARE:  Patient is making limited progress towards goals. Will continue with current POC.       Time in  1200   Time out  1215    Total Treatment Time 15  minutes       CPT codes:  [] Gait training 75589 - minutes  [] Manual therapy 93374 - minutes  [x] Therapeutic activities 59440 15 minutes  [] Therapeutic exercises 52807 - minutes  [] Neuromuscular reeducation 95433 - minutes     Emerald Godinez, PTA  [id number]

## 2021-05-05 NOTE — PROGRESS NOTES
Acute Rehab Pre-Admission Screen      Referral date: 5/5/21  Onset/Hospital Admit Date: 4/25/2021  4:38 PM    Current Location: [facility]    Name: Reynold Reveal: 1965  Age: 48 y.o. Admitting Diagnosis: SCI, burst fracture   Address: [address] [place] [address]  Home Phone: [phone] (home)  Social Security #:     Sex: male  Race:   Marital Status:    Ethnic/Cultural/Quaker Considerations: Mu-ism    Advanced Directives: [x] Full Code  [] [facility] [] Medications only       [] Living Will  [] DPOA      []Organ donor      [] No mechanical breathing or ventilation     [] no tube feeding, nutrition or hydration      [x] Patient does not have advanced directives or living will         COVERAGE INFORMATION   Primary Insurer: no insurance    Verified coverage: [] Patient  [] Family/caregiver    [x] financial department [] insurance carrier    COVID SCREEN DATE:  Result: (negative, positive)      MEDICAL UPDATE:  History of present admission:       PHYSICIAN / REFERRAL INFORMATION  Referring Physician:   Attending Physician: Hank Yadav MD  Primary Care Physician: Hiwot Salcido MD  Consultants/Opinions (see full consult notes on chart):     SOCIAL INFORMATION  Primary  Contact: Roya Bender  Relationship: wife  Primary Phone: [phone]  Secondary Phone:       Previous Community Services: none  Caregiver available: [] Yes [] No Hours per day available: tbd  Patient previously employed:  [] Yes [] Part Time [] Full Time [] No [] Retired  Occupation/Profession: not employed  Prior living arrangements: [] Home  [] Assisted living  [] SNF [] Other  Lived with:  [] Alone  [] Spouse  [] Family  [] Other  Lived with: wife  Contact phone: see above  Home:  1 story home  ramped entry      Bedroom: [x] 1st floor  [] 2nd floor    Bathroom:  [x] 1st floor  [] 2nd floor    Prior Functional Level:   Independent for: ADLs, IADLs, drove  Assistance for:   Dependent for:   Dominant hand: [] Right  [] Left    Previous Home Equipment:  [] Cane [] Grab bars [] Orthotic / prosthetic   [] Shower chair [] Tub bench  [] 3-in-1 Commode [] Long handle sponge   [] Oxygen [] Sock aide  [] Wheelchair  [] motorized wc/scooter  [] Wheelchair cushion   [] Crutches [] Long handle shoehorn  [] Reachers [] Toilet seat elevator [] Rollator  [] Walker(wheeled)   [] Walker(standard) [] Mechanical lift    [x] None of the above     Has patient had 2 or more falls in the past year or any fall with injury in the past year? [] yes   [] no   []unknown    Has patient had major surgery during past 100 days prior to admission?    [x] yes   [] no Type/ Date: 4/30 B T12 laminectomy    Surgical History:  Past Surgical History:   Procedure Laterality Date    CARPAL TUNNEL RELEASE Right 2012    LAMINECTOMY N/A 4/30/2021    T12 LAMINECTOMY WITH T12 KYPHO performed by Michael Kyle MD at [address] [place], [place] [id number]    cervical 2-6, fusion    WRIST ARTHROSCOPY  5/30/14    right with decompression       Past Medical:  Past Medical History:   Diagnosis Date    Arthritis     right wrist    Diabetes mellitus ([facility] [address]     borderline, tries to watch diet    Difficult intubation     Hypertension     bp has been sporadic    Infected tooth 5/21/2014    saw dentist, placed on antibiotic, to call Dr. Jaclyn Herrera office to inform    Pain     chronic, goes to pain clinic, placed on Methodone    Preoperative clearance 5/28/2014    dental- Dr. Aaron Kauffman; paper copy on chart       Current Co-morbidities:  [] Alzheimer's   [] Dysphasia     [] Parkinsonism  [] Amputation   [] GERD  [] Peripheral artery disease   [] Anemia      [] Encephalopathy  [] Peripheral vascular disease  [] Anxiety   [] Gangrene   [] Pneumonia  [] Aphasia   [] Gout    [] Polyneuropathy  [] Asthma   [] Heart Failure (diastolic) [] Post-polio syndrome  [] Atrial fibrillation  [] Heart Failure (left-sided) [] Pseudomonas enteritis   [] Blind   [] Heart Failure (right-sided) [] Pulmonary embolism  [] Cellulitis     [] Heart Failure (systolic) [] Renal dialysis  [] Clostridium difficile  [] Hemiparesis   [] Renal failure  [] Congestive heart failure [] Hypertension   [] Rheumatoid arthritis  [x] cancer   [] Hypotension   [] Seizure disorder   [] Coronary Artery Disease [] Hypothyroidism  [] Septicemia   [] Deaf   [] Hyperlipidemia  [] Sleep apnea  [] Depression   [] Morbid obesity  [] Spinal cord injury  [] Diabetes   [] MRSA   [] Stroke  [] Diabetic nephropathy  [] Myocardial infarction  [] Tracheostomy  [] Diabetic neuropathy  [] Osteoarthritis  [] Traumatic brain injury   [] Diabetic retinopathy  [] Osteoporosis   [] Urinary tract infection  [] DVT    [] Pancytopenia  [] Vocal cord paralysis  []  Spinal stenosis   []  kidney disease [] VRE  [x] Post op laminectomy [x] t12 burst fx   []        Medical/Functional Conditions requiring inpatient rehabilitation:     Risk for Medical/Clinical Complications:     CLINICAL DATA:     Height :      Weight:     BMI:        Date:  Date:  Date:    temperature      pulse      respirations      Blood pressure      Pulse oximeter         ALLERGIES: Patient has no known allergies. DIET : DIET CARB CONTROL; Carb Control: 4 carb choices (60 gms)/meal  Dietary Nutrition Supplements: Diabetic Oral Supplement    Current Lab and Diagnostic Tests:   Recent Results (from the past 24 hour(s))   POCT Glucose    Collection Time: 05/04/21  6:10 PM   Result Value Ref Range    Meter Glucose 158 (H) 74 - 99 mg/dL   POCT Glucose    Collection Time: 05/04/21  9:28 PM   Result Value Ref Range    Meter Glucose 231 (H) 74 - 99 mg/dL   POCT Glucose    Collection Time: 05/05/21  7:15 AM   Result Value Ref Range    Meter Glucose 208 (H) 74 - 99 mg/dL   POCT Glucose    Collection Time: 05/05/21 11:43 AM   Result Value Ref Range    Meter Glucose 248 (H) 74 - 99 mg/dL     No results found.     Additional labs or diagnostic studies needed before admission to rehabilitation unit:      Medications:   pregabalin  150 mg Oral BID    DULoxetine  30 mg Oral Daily    oxyCODONE  20 mg Oral 2 times per day    enoxaparin  40 mg Subcutaneous Daily    sodium chloride flush  5-40 mL Intravenous 2 times per day    sennosides-docusate sodium  1 tablet Oral BID    polyethylene glycol  17 g Oral BID    nicotine  1 patch Transdermal Daily    acetaminophen  1,000 mg Oral TID    lamoTRIgine  100 mg Oral BID    sodium chloride flush  10 mL Intravenous 2 times per day    insulin lispro  0-6 Units Subcutaneous TID WC    insulin lispro  0-3 Units Subcutaneous Nightly    lisinopril  20 mg Oral Daily      sodium chloride      sodium chloride      dextrose       [DISCONTINUED] oxyCODONE **OR** oxyCODONE, bisacodyl, HYDROmorphone **OR** [DISCONTINUED] HYDROmorphone, diazePAM, sodium chloride flush, sodium chloride, promethazine **OR** ondansetron, sodium chloride flush, sodium chloride, potassium chloride **OR** potassium alternative oral replacement **OR** potassium chloride, magnesium hydroxide, glucose, dextrose, glucagon (rDNA), dextrose, trimethobenzamide    SPECIAL PRECAUTIONS: [] No current precautions  [] Cardiac  [] Renal [] Sternal [] Respiratory      [] Neurological           [] Hip  [] Spinal [] Seizure  [] Aspiration  [] Isolation precautions:    [] Contact   [] Respiratory   [] Protective     [] Droplet    [] Weight Bearing precautions:         [] Non Weight Bearing :         [] Toe Touch Weight Bearing :        [] Partial Weight Bearing :         [] Weight Bearing as Tolerated :         [] Fall Risk:   [] Recent history of falls [] Falls risk level (Adams Scale):       [] Bed Alarm    [] Do not leave alone in the bathroom    [] Chair Alarm    [] Cognitive impairment      [] One to One supervision  [] Sitter / Tele sitter   [] Safety enclosure bed  [] Decreased balance     SPECIAL REHABILITATION NEEDS:   [] IV Therapy: [] PRN Adapter  [] Midline  [] PICC      [] Central Line    [] TPN       [] Oxygen: [] Trach [] Bi-PAP [] CPAP  [] Nasal cannula  [] Liters:      [] Wound Care:   [] Pressure ulcers(stage and location) -    [] Wound vac   [] Wound or incision care    [] Pain Management (level of pain, meds):      [] Incontinence Bladder [] Figueroa  Insertion date:    []Hemodialysis and  Frequency:   [] Incontinence Bowel    [] Last bowel movement :     Substance use history: [] Yes  [] No   [] Tobacco  [] Alcohol  [] Other     [] Ethnic  [] Cultural  [] Spiritual  [] Language [] Needs  [] Other than English  [] Hearing Impaired  [] Visually Impaired  [] Speaking Impaired  [] Blind  [] Special equipment:  [] Devices/Splints  [] Type   [] Brace   [] Type  [] Bariatric bed  [] Extra wide commode  [] Extra wide wheelchair [] Extra wide walker  [] Mendez walker  [] Mendez wheelchair  [] Transfer lift    [] Other equipment     FUNCTIONAL STATUS PT / [place] / Milly Dominguez:  FIM / EVAL Discipline Initial:  Follow Up:  Current:    Eating OT      Grooming OT      Bathing OT      Dressing Upper Extremity OT      Dressing Lower Extremity OT      Toileting OT      Toilet Transfers OT      Tub/Shower Transfers OT      Homemaking OT      Bed Mobility PT      Bed/Wheelchair Transfers PT      Locomotion Walk / Wheelchair  Device:  Distance: PT      Endurance PT      Expression SP      Social Interaction SP      Problem Solving SP      Memory SP      Comprehension SP      Swallowing SP      Bowel Management NSG      Bladder Management NSG        Comments on Functional Status:     [x] Able to participate a minimum of 3 hours per day of therapy intervention    Required treatments/services: [x] Rehabilitation nursing [] Dietitian / nurtition                 [x] Case management  [] Respiratory Therapy      [x] Social work   [] Other     Required Therapy:  Therapy Hours per Day Days per Week Therapeutic Interventions Required   [x] Physical Therapy  5-7    [x] Occupational Therapy  5-7    [] Speech Pathology      [] Prosthetics / Jose Sensing       []         Anticipated Discharge Plan:   Anticipated DME Needs:  [] Home     [] Commode   [] Alone    [] Wheelchair   [] Supervised    [] Walker   [] Assist    [] Oxygen        [] Hospital Bed  [] Assisted Living    [] Ramp        [] To Be Determined    Anticipated Home Health Services:  Anticipated Outpatient Services:  [] PT       [] PT  [] OT      [] OT  [] Speech     [] Speech  [] Nursing     [] Dialysis  [] Aide      [x] To Be Determined  [x] To Be Determined    Anticipated support group:  [] Amputation  [] Multiple Sclerosis  [] Stroke  [] Brain Injury  [] Spinal cord injury  [] Other     Barriers to discharge:     Discharge Support: [] Patient lives alone and does not have a caregiver available     [] Patient has a caregiver available     [] Discharge plan has been verified with patient's caregiver      [] Caregiver is in agreement with the discharge plan     Expected functional status for safe discharge:     Patient/support person goals:     Expected length of stay:     Discussed expected length of stay and agreeable to IRF plan: [] Yes   [] No    Impairment Group Category:     Etiological Diagnosis:     Primary Rehabilitation Diagnosis:     Electronically signed by Cary Garcia RN on 5/5/2021 at 12:19 PM    Prescreen completed __________________________________ (signature of prescreener)    Date:    Time:      JUSTIFICATION FOR ADMISSION TO ACUTE REHABILITATION:          RECOMMEND LEVEL OF CARE  Recommend inpatient rehabilitation: [] Yes   [] No  If no indicate reason:  [] Functional level too high  [] Unmotivated  [] No insurance carrier approval [] Unlikely to return to community  [] No medical necessity  [] Patient or family chose other facility  [] Too medically complex  [] Inadequate discharge plan  [] Rehabilitation bed unavailable [] Functional level too low  [] patient or family refused ARU    If patient not accepted for IRF admission, recommended level of care:  [] Subacute Rehabilitation Facility  [] [address]  [] [place]   [] Home Care  [] Other      [] LTAC       Physician Assigned:  [] Dr. Fred Quigley         [] Dr. Eladia Cota              [] Dr. Jerilyn Mcmullen [] Dr. Rigoberto Almeida  [] DrDiana Sorensen Railing:    ____________________________________________________________________  ____________________________________________________________________  ____________________________________________________________________  ____________________________________________________________________  ____________________________________________________________________      Physician Signature:_____________________________________    Print Signature:_________________________________________    Date:    Time:        PRE-SCREEN ASSESSMENT UPDATE (if not admitted within 48 hours of initial pre-screen)    Medical Update/Changes:     Functional Update/Changes:     Reviewer Signature:_____________________________________    Date:   Time:     PHYSICIAN ADMISSION DETERMINATION AND REVIEW UPDATE:     ____________________________________________________________________  ____________________________________________________________________  ____________________________________________________________________  ____________________________________________________________________  ____________________________________________________________________    Physician Signature:_____________________________________    Print Signature:_________________________________________    Date:     Time:

## 2021-05-05 NOTE — CONSULTS
activity: Not on file   Lifestyle    Physical activity     Days per week: Not on file     Minutes per session: Not on file    Stress: Not on file   Relationships    Social connections     Talks on phone: Not on file     Gets together: Not on file     Attends Gnosticism service: Not on file     Active member of club or organization: Not on file     Attends meetings of clubs or organizations: Not on file     Relationship status: Not on file    Intimate partner violence     Fear of current or ex partner: Not on file     Emotionally abused: Not on file     Physically abused: Not on file     Forced sexual activity: Not on file   Other Topics Concern    Not on file   Social History Narrative    Not on file       Home situation: Lives with spouse in a 1 level home with ramped entrance. Functional History:  Premorbid ADL's: independent   Premorbid Mobility: Independent  Present: significant decrease in mobility and function     Physical Therapy:  Bed mobility: Max A x2 (Dependent )  Transfers: Max A x2 (Dependent )  Ambulation: Not tested    Occupational Therapy:  Grooming: Set up  Upper body dressing: Max. A  Lower body dressing: Max.   A    Review Of Systems:   Contsitutional: No Fever, chills  HEENT: No HA, blurry vision  Respiratory: No Cough, SOB  Cardiovascular: No CP  Gastrointestinal: No nausea, vomiting, constipation, abdominal discomfort  Genitourinary: No Dysuria  Musculoskeletal: per HPI  Neurologic: Per HPI  Psychiatric: No Depression, No anxiety      Physical Examination:  Vitals:   Vitals:    05/04/21 1516 05/04/21 2304 05/05/21 0800 05/05/21 1514   BP: (!) 170/84 (!) 173/78 (!) 150/86 (!) 187/90   Pulse: 96 95 95 90   Resp: 18 18 18 18   Temp: 97.8 °F (36.6 °C) 98.7 °F (37.1 °C) 97.5 °F (36.4 °C) 98.7 °F (37.1 °C)   TempSrc: Temporal Temporal Infrared Infrared   SpO2: 96% 95% 92% 95%   Weight:       Height:           GEN: NAD, resting in bed  HEENT: NC/AT, PERRL, EOMI  RESP: CTAB, no R/R/W  CV: +S1 S2, RRR  ABD: soft, NT,  normal BS   EXT: No erythema/clubbing/cyanosis  Skin: No rash    Neuro Exam:  AAOx4  Speech clear content appropriate  Follows all commands    Cranial Nerves:  I: olfactory not tested  II visual fields intact to confrontation  III, IV, VI: extra occular muscles intact  Pupils (II, III): ERRL  V: Facial Sensation/Jaw clench: intact  VII: Facial Motor: intact  VIII. Hearing: intact  XI/X: Palate: intact  XI: Shoulder shrug/SCM:intact  XII: Tongue: intact    Sensory:    LT: intact          Motor:  T  Strength is 5/5 throughout bilateral upper extremities. Strength is 2/5 at bilateral hip flexors and knee extensors. Strength is 4/5 bilateral ankle dorsiflexion and plantar flexion  *MMT is limited by pain in the lower extremities      Laboratory:    Lab Results   Component Value Date    WBC 11.2 05/03/2021    HGB 13.5 05/03/2021    HCT 42.0 05/03/2021    MCV 85.5 05/03/2021     05/03/2021     Lab Results   Component Value Date     05/03/2021    K 4.0 05/03/2021    K 4.4 04/29/2021    CL 97 05/03/2021    CO2 26 05/03/2021    BUN 10 05/03/2021    CREATININE 0.5 05/03/2021    GLUCOSE 128 05/03/2021    CALCIUM 8.9 05/03/2021      Lab Results   Component Value Date    ALT 13 04/28/2021    AST 10 04/28/2021    ALKPHOS 76 04/28/2021    BILITOT <0.2 04/28/2021     Lab Results   Component Value Date    COLORU Yellow 05/01/2021    NITRU Negative 05/01/2021    GLUCOSEU Negative 05/01/2021    KETUA 15 05/01/2021    UROBILINOGEN 0.2 05/01/2021    BILIRUBINUR Negative 05/01/2021     Lab Results   Component Value Date    LABA1C 8.7 (H) 04/26/2021         IMPRESSION:  Jemal Church is a 48 y.o. male with impairments and acitivities limitations in ADLs and mobility secondary to T12 compression fracture      Recommendations:   Patient has severe functional deficits. He is essentially dependent for all functional mobility.   Additionally, he has had poor tolerance of therapy evaluations due to reported pain with movement. Patient is currently too low functioning for acute rehabilitation and will not tolerate 3 hours of therapy daily. At this time he is more appropriate for [facility] level of care. If therapy tolerance improves and he begins to show functional progress, could reconsider ARU. Thank you for the consult.     Robert Paige MD  Physical Medicine and Rehabilitation

## 2021-05-06 ENCOUNTER — APPOINTMENT (OUTPATIENT)
Dept: NUCLEAR MEDICINE | Age: 56
DRG: 321 | End: 2021-05-06
Payer: COMMERCIAL

## 2021-05-06 ENCOUNTER — TELEPHONE (OUTPATIENT)
Dept: RADIATION ONCOLOGY | Age: 56
End: 2021-05-06

## 2021-05-06 LAB
ANION GAP SERPL CALCULATED.3IONS-SCNC: 9 MMOL/L (ref 7–16)
BASOPHILS ABSOLUTE: 0.06 E9/L (ref 0–0.2)
BASOPHILS RELATIVE PERCENT: 0.6 % (ref 0–2)
BUN BLDV-MCNC: 11 MG/DL (ref 6–20)
CALCIUM SERPL-MCNC: 9.2 MG/DL (ref 8.6–10.2)
CHLORIDE BLD-SCNC: 97 MMOL/L (ref 98–107)
CO2: 28 MMOL/L (ref 22–29)
CREAT SERPL-MCNC: 0.5 MG/DL (ref 0.7–1.2)
EOSINOPHILS ABSOLUTE: 0.5 E9/L (ref 0.05–0.5)
EOSINOPHILS RELATIVE PERCENT: 4.8 % (ref 0–6)
GFR AFRICAN AMERICAN: >60
GFR NON-AFRICAN AMERICAN: >60 ML/MIN/1.73
GLUCOSE BLD-MCNC: 176 MG/DL (ref 74–99)
HCT VFR BLD CALC: 45.3 % (ref 37–54)
HEMOGLOBIN: 14.1 G/DL (ref 12.5–16.5)
IMMATURE GRANULOCYTES #: 0.04 E9/L
IMMATURE GRANULOCYTES %: 0.4 % (ref 0–5)
LYMPHOCYTES ABSOLUTE: 2 E9/L (ref 1.5–4)
LYMPHOCYTES RELATIVE PERCENT: 19.3 % (ref 20–42)
MCH RBC QN AUTO: 27.1 PG (ref 26–35)
MCHC RBC AUTO-ENTMCNC: 31.1 % (ref 32–34.5)
MCV RBC AUTO: 87.1 FL (ref 80–99.9)
METER GLUCOSE: 136 MG/DL (ref 74–99)
METER GLUCOSE: 163 MG/DL (ref 74–99)
METER GLUCOSE: 192 MG/DL (ref 74–99)
METER GLUCOSE: 262 MG/DL (ref 74–99)
MONOCYTES ABSOLUTE: 0.95 E9/L (ref 0.1–0.95)
MONOCYTES RELATIVE PERCENT: 9.2 % (ref 2–12)
NEUTROPHILS ABSOLUTE: 6.79 E9/L (ref 1.8–7.3)
NEUTROPHILS RELATIVE PERCENT: 65.7 % (ref 43–80)
PDW BLD-RTO: 12.9 FL (ref 11.5–15)
PLATELET # BLD: 349 E9/L (ref 130–450)
PMV BLD AUTO: 10.2 FL (ref 7–12)
POTASSIUM SERPL-SCNC: 4.3 MMOL/L (ref 3.5–5)
RBC # BLD: 5.2 E12/L (ref 3.8–5.8)
SODIUM BLD-SCNC: 134 MMOL/L (ref 132–146)
WBC # BLD: 10.3 E9/L (ref 4.5–11.5)

## 2021-05-06 PROCEDURE — 6370000000 HC RX 637 (ALT 250 FOR IP): Performed by: PHYSICIAN ASSISTANT

## 2021-05-06 PROCEDURE — 6370000000 HC RX 637 (ALT 250 FOR IP): Performed by: INTERNAL MEDICINE

## 2021-05-06 PROCEDURE — 6360000002 HC RX W HCPCS: Performed by: PHYSICIAN ASSISTANT

## 2021-05-06 PROCEDURE — 1200000000 HC SEMI PRIVATE

## 2021-05-06 PROCEDURE — 80048 BASIC METABOLIC PNL TOTAL CA: CPT

## 2021-05-06 PROCEDURE — 78306 BONE IMAGING WHOLE BODY: CPT

## 2021-05-06 PROCEDURE — 3430000000 HC RX DIAGNOSTIC RADIOPHARMACEUTICAL: Performed by: RADIOLOGY

## 2021-05-06 PROCEDURE — 6360000002 HC RX W HCPCS: Performed by: INTERNAL MEDICINE

## 2021-05-06 PROCEDURE — 85025 COMPLETE CBC W/AUTO DIFF WBC: CPT

## 2021-05-06 PROCEDURE — A9503 TC99M MEDRONATE: HCPCS | Performed by: RADIOLOGY

## 2021-05-06 PROCEDURE — 78306 BONE IMAGING WHOLE BODY: CPT | Performed by: RADIOLOGY

## 2021-05-06 PROCEDURE — 82962 GLUCOSE BLOOD TEST: CPT

## 2021-05-06 PROCEDURE — 97530 THERAPEUTIC ACTIVITIES: CPT

## 2021-05-06 PROCEDURE — 99233 SBSQ HOSP IP/OBS HIGH 50: CPT | Performed by: PAIN MEDICINE

## 2021-05-06 PROCEDURE — 2580000003 HC RX 258: Performed by: PHYSICIAN ASSISTANT

## 2021-05-06 PROCEDURE — 36415 COLL VENOUS BLD VENIPUNCTURE: CPT

## 2021-05-06 RX ORDER — TC 99M MEDRONATE 20 MG/10ML
29 INJECTION, POWDER, LYOPHILIZED, FOR SOLUTION INTRAVENOUS
Status: COMPLETED | OUTPATIENT
Start: 2021-05-06 | End: 2021-05-06

## 2021-05-06 RX ADMIN — OXYCODONE HYDROCHLORIDE 10 MG: 10 TABLET ORAL at 03:48

## 2021-05-06 RX ADMIN — INSULIN LISPRO 3 UNITS: 100 INJECTION, SOLUTION INTRAVENOUS; SUBCUTANEOUS at 09:00

## 2021-05-06 RX ADMIN — LISINOPRIL 20 MG: 20 TABLET ORAL at 08:29

## 2021-05-06 RX ADMIN — DULOXETINE HYDROCHLORIDE 30 MG: 30 CAPSULE, DELAYED RELEASE ORAL at 08:29

## 2021-05-06 RX ADMIN — LAMOTRIGINE 100 MG: 100 TABLET ORAL at 08:28

## 2021-05-06 RX ADMIN — ACETAMINOPHEN 1000 MG: 500 TABLET, FILM COATED ORAL at 14:41

## 2021-05-06 RX ADMIN — HYDROMORPHONE HYDROCHLORIDE 1 MG: 1 INJECTION, SOLUTION INTRAMUSCULAR; INTRAVENOUS; SUBCUTANEOUS at 05:44

## 2021-05-06 RX ADMIN — METFORMIN HYDROCHLORIDE 500 MG: 500 TABLET ORAL at 08:29

## 2021-05-06 RX ADMIN — Medication 10 ML: at 20:21

## 2021-05-06 RX ADMIN — ACETAMINOPHEN 1000 MG: 500 TABLET, FILM COATED ORAL at 20:21

## 2021-05-06 RX ADMIN — HYDROMORPHONE HYDROCHLORIDE 1 MG: 1 INJECTION, SOLUTION INTRAMUSCULAR; INTRAVENOUS; SUBCUTANEOUS at 14:41

## 2021-05-06 RX ADMIN — Medication 10 ML: at 18:56

## 2021-05-06 RX ADMIN — HYDROMORPHONE HYDROCHLORIDE 1 MG: 1 INJECTION, SOLUTION INTRAMUSCULAR; INTRAVENOUS; SUBCUTANEOUS at 18:56

## 2021-05-06 RX ADMIN — OXYCODONE HYDROCHLORIDE 10 MG: 10 TABLET ORAL at 17:42

## 2021-05-06 RX ADMIN — INSULIN LISPRO 1 UNITS: 100 INJECTION, SOLUTION INTRAVENOUS; SUBCUTANEOUS at 12:13

## 2021-05-06 RX ADMIN — SENNOSIDES AND DOCUSATE SODIUM 1 TABLET: 8.6; 5 TABLET ORAL at 20:21

## 2021-05-06 RX ADMIN — INSULIN LISPRO 1 UNITS: 100 INJECTION, SOLUTION INTRAVENOUS; SUBCUTANEOUS at 20:22

## 2021-05-06 RX ADMIN — HYDROMORPHONE HYDROCHLORIDE 1 MG: 1 INJECTION, SOLUTION INTRAMUSCULAR; INTRAVENOUS; SUBCUTANEOUS at 09:59

## 2021-05-06 RX ADMIN — Medication 10 ML: at 14:41

## 2021-05-06 RX ADMIN — HYDROMORPHONE HYDROCHLORIDE 1 MG: 1 INJECTION, SOLUTION INTRAMUSCULAR; INTRAVENOUS; SUBCUTANEOUS at 22:30

## 2021-05-06 RX ADMIN — ACETAMINOPHEN 1000 MG: 500 TABLET, FILM COATED ORAL at 08:29

## 2021-05-06 RX ADMIN — DIAZEPAM 5 MG: 5 TABLET ORAL at 15:41

## 2021-05-06 RX ADMIN — ENOXAPARIN SODIUM 40 MG: 40 INJECTION SUBCUTANEOUS at 08:30

## 2021-05-06 RX ADMIN — METFORMIN HYDROCHLORIDE 500 MG: 500 TABLET ORAL at 17:42

## 2021-05-06 RX ADMIN — OXYCODONE HYDROCHLORIDE 20 MG: 20 TABLET, FILM COATED, EXTENDED RELEASE ORAL at 20:21

## 2021-05-06 RX ADMIN — Medication 10 ML: at 08:30

## 2021-05-06 RX ADMIN — Medication 10 ML: at 09:59

## 2021-05-06 RX ADMIN — OXYCODONE HYDROCHLORIDE 20 MG: 20 TABLET, FILM COATED, EXTENDED RELEASE ORAL at 08:29

## 2021-05-06 RX ADMIN — PREGABALIN 150 MG: 150 CAPSULE ORAL at 20:21

## 2021-05-06 RX ADMIN — LAMOTRIGINE 100 MG: 100 TABLET ORAL at 20:20

## 2021-05-06 RX ADMIN — HYDROMORPHONE HYDROCHLORIDE 1 MG: 1 INJECTION, SOLUTION INTRAMUSCULAR; INTRAVENOUS; SUBCUTANEOUS at 01:34

## 2021-05-06 RX ADMIN — PREGABALIN 150 MG: 150 CAPSULE ORAL at 08:29

## 2021-05-06 RX ADMIN — OXYCODONE HYDROCHLORIDE 10 MG: 10 TABLET ORAL at 12:15

## 2021-05-06 RX ADMIN — DIAZEPAM 5 MG: 5 TABLET ORAL at 08:30

## 2021-05-06 RX ADMIN — TC 99M MEDRONATE 29 MILLICURIE: 20 INJECTION, POWDER, LYOPHILIZED, FOR SOLUTION INTRAVENOUS at 07:55

## 2021-05-06 ASSESSMENT — PAIN DESCRIPTION - PAIN TYPE
TYPE: SURGICAL PAIN;CHRONIC PAIN
TYPE: ACUTE PAIN
TYPE: SURGICAL PAIN;CHRONIC PAIN
TYPE: SURGICAL PAIN
TYPE: SURGICAL PAIN;CHRONIC PAIN
TYPE: SURGICAL PAIN;CHRONIC PAIN
TYPE: SURGICAL PAIN

## 2021-05-06 ASSESSMENT — PAIN DESCRIPTION - LOCATION
LOCATION: BACK
LOCATION: BACK
LOCATION: LEG
LOCATION: BACK
LOCATION: BACK;LEG
LOCATION: BACK

## 2021-05-06 ASSESSMENT — PAIN DESCRIPTION - FREQUENCY
FREQUENCY: CONTINUOUS

## 2021-05-06 ASSESSMENT — PAIN SCALES - GENERAL
PAINLEVEL_OUTOF10: 7
PAINLEVEL_OUTOF10: 0
PAINLEVEL_OUTOF10: 10
PAINLEVEL_OUTOF10: 7
PAINLEVEL_OUTOF10: 3
PAINLEVEL_OUTOF10: 9
PAINLEVEL_OUTOF10: 8
PAINLEVEL_OUTOF10: 7
PAINLEVEL_OUTOF10: 0
PAINLEVEL_OUTOF10: 9
PAINLEVEL_OUTOF10: 8
PAINLEVEL_OUTOF10: 0
PAINLEVEL_OUTOF10: 9
PAINLEVEL_OUTOF10: 7
PAINLEVEL_OUTOF10: 0
PAINLEVEL_OUTOF10: 7
PAINLEVEL_OUTOF10: 0

## 2021-05-06 ASSESSMENT — PAIN - FUNCTIONAL ASSESSMENT
PAIN_FUNCTIONAL_ASSESSMENT: PREVENTS OR INTERFERES WITH MANY ACTIVE NOT PASSIVE ACTIVITIES

## 2021-05-06 ASSESSMENT — PAIN DESCRIPTION - PROGRESSION
CLINICAL_PROGRESSION: NOT CHANGED

## 2021-05-06 ASSESSMENT — PAIN DESCRIPTION - DESCRIPTORS
DESCRIPTORS: ACHING;CONSTANT;DISCOMFORT
DESCRIPTORS: ACHING;DISCOMFORT;CONSTANT
DESCRIPTORS: ACHING;CONSTANT;DISCOMFORT
DESCRIPTORS: ACHING;CONSTANT;DISCOMFORT
DESCRIPTORS: ACHING;CRAMPING;DISCOMFORT
DESCRIPTORS: ACHING;CONSTANT;DISCOMFORT

## 2021-05-06 ASSESSMENT — PAIN DESCRIPTION - ONSET
ONSET: ON-GOING

## 2021-05-06 ASSESSMENT — PAIN DESCRIPTION - ORIENTATION
ORIENTATION: MID
ORIENTATION: MID
ORIENTATION: RIGHT;LEFT
ORIENTATION: MID
ORIENTATION: RIGHT;LEFT

## 2021-05-06 NOTE — TELEPHONE ENCOUNTER
Tye FUNK called from Dr. Irene Leung office to confirm that patient must wait 1 month post op before we initiate radiation therapy.

## 2021-05-06 NOTE — PROGRESS NOTES
Occupational Therapy  OT BEDSIDE TREATMENT NOTE      Date:2021  Patient Name: Dyan Bergman  MRN: [id number]  : 1965  Room: [address] [place] [address]     Evaluating OT: Raul Tavarez OTR/L   Referring provider: BLESSING Castaneda     AM-PAC Daily Activity Raw Score:   Recommended Adaptive Equipment: continue to assess     Diagnosis: Thoracic Back Pain   Surgery: S/p T12 kyphoplasty and T12 laminectomy for tumor decompression on     Pertinent Medical History: Arthritis, DM, CTS  Additional information: pt being re-evaluated s/p surgery      Precautions:  Falls, Soft TLSO,HOB < 45*, Spinal precautions      Home Living: Pt lives with wife in a 1 story home with no steps to enter and no HR, ramp to enter. .  Bathroom setup: tub/shower, std. Commodes, walk in shower in basement   Equipment owned: none     Prior Level of Function: Ind. with ADLs , Ind. with IADLs; ambulated no A.D. Driving: active  Occupation:      Pain Level: pt c/o 10/10 BLE pain, increased when laying flat  Cognition: A&O: 3; Follows 1-2 step directions              Memory: G              Sequencing: F              Problem solving: F              Judgement/safety: F-                Functional Assessment:    Initial Eval Status  Date:  Treatment Status  Date:  Short Term Goals  Treatment frequency: 1-3x/week on PRN    Feeding Setup A  Set up     Grooming/Hygiene Mod I bed level Set up   Upright in bed   Mod I seated in chair       UB Dressing Mod A Max A  To adjust gown and don TLSO semi upright in bed   Mod I seated       LB Dressing DEP Max A  To don/doff socks supine in bed   Mod A    Bathing Max A Max A  simulated      Toileting DEP Dependent  Per last tx   Mod A   Bed Mobility  Supine to sit: Max Ax2  Sit to supine: Max Ax2  max A- rolling  Max A x 2- supine<->sit  Educated pt on technique to increase independence.         Functional Transfers Sit to stand: Max Ax2  Stand to sit:  Max Ax2  Assist of 3rd person d/t BLE

## 2021-05-06 NOTE — CONSULTS
Pain Management Progress Note     CHIEF COMPLAINT: Post-operative pain S/p T12 kyphoplasty/laminectomy     SUBJECTIVE: Pt reports improved mid to low back radiating down legs with medication adjustments and changes started on 5/5/21. Pt still requires Dilaudid IV for breakthrough pain every 6-7 hours. Pt appears to be progressing with physical therapy towards established goals. Pt now able to tolerate sitting at the end of the bed but still unable to ambulate without severe pain. Pt expressed concern for being able to afford medication upon discharge. I discussed Good rx card and working towards goals of weaning off Oxycontin (due to cost) if able and adjusting short acting medication as necessary to control pain. REVIEW OF SYSTEMS:   Constitutional: Negative for chills and fever. Neurological: Negative for dizziness, tremors and speech change. OBJECTIVE:    BP (!) 187/90   Pulse 90   Temp 98.7 °F (37.1 °C) (Infrared)   Resp 18   Ht 6' (1.829 m)   Wt 283 lb 2 oz (128.4 kg)   SpO2 95%   BMI 38.40 kg/m²    PHYSICAL:   CONSTITUTIONAL: awake, alert, cooperative, mild distress. 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 Continue with medication as previously prescribed. Consider discontinuation of Oxycontin prior to discharge due to cost considerations.    2. Pt continues to use Dilaudid every 6-7 hours despite increase of Oxycontin 20mg po bid and Oxycodone 10mg po q 4 hours. Continue to monitor. 3. Follow up with patient tomorrow morning on 5/7/21 and encourage less usage of dilaudid IV and using po pain medication only in preparation for discharge to skilled nursing facility  3. Continue premedication of Oxycodone 10mg IR 30-45 min prior to the start of physical therapy to assist with more productive physical therapy sessions    4. Continue with physical and occupational therapy as tolerated to prepare for discharge to [address] [facility] facility  4.  NOT a candidate for NSAIDs with recent start of Lovenox     Discussed the above with Mayra Valera CNP, agree with assessment and plan  Brandee Doran

## 2021-05-06 NOTE — PROGRESS NOTES
Progress note    Subjective:  OxyContin did help his pain a bit. He thinks he feels a little better with the doubling of the dose yesterday. His weakness is stable. He continues to be his own worst enemy. He has been refusing physical therapy, each time with various excuses. Today he refused at multiple times because he was afraid that physical therapy would increase his pain, and then render him unable to get the bone scan that is scheduled. He has been working only halfheartedly to get insurance coverage. Apparently he was offered [place] benefits after the loss of his job in February. He states that the letter actually arrived after the deadline to respond. He actually has a decent amount of assets and income. He has been getting $600 per week in unemployment benefits since losing his job. He owns his home outright. He states that prior to getting laid off, he was making close to $2400 per week. He has a $50,000 pickup truck that he is still making $800 per month payments on, even though it is unlikely that he will be able to drive it anytime soon. He states that he does not want to give up his assets to pay medical expenses. I pointed out that if he dies from cancer his assets and a pickup truck will not be useful to him. I made it crystal clear that he needs to get health insurance. He understands he has an aggressive form of cancer that is metastatic and will kill him quickly if he does not act soon. Objective:  His vitals are stable, see EMR  General: Obese nontoxic man in no acute distress  Cardiac: Regular rate and rhythm without murmurs  Lungs: Clear bilaterally anteriorly  Abdomen: Present bowel sounds soft nontender nondistended without rebound or guarding  Extremities: No lower ex tremity edema  Neuro: Bilateral mild to moderate hip flexor weakness. Preserved strength in the bilateral foot dorsiflexors and plantar flexors.   Sensation is grossly intact to light touch in the bilateral lower extremities. 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 Maria Dolores Owusu our oncologist.  She states this is very aggressive cancer and really he needs an optimal debulking (nephrectomy) followed by chemotherapy and probably radiation. She feels that he could decline quickly, such as within a month, if this is not done in a timely fashion. I really doubt this can be arranged as an outpatient in a timely fashion, due to the patient's mobility issues and currently we don't even have a place to send him due to his lack of insurance. I will see if we can send him directly to [facility], given aggressiveness of his cancer and the fact that I don't see a workable safe home discharge / outpatient referral plan.     Time spent in face-to-face counseling and coordination of care: 90 minutes  Gaby Alvarez MD  5/6/2021   [phone]

## 2021-05-06 NOTE — PROGRESS NOTES
5/6/2021 10:54 AM  Service: Urology  Group: [facility] (Nayan/Charissa)    Cr Saeed  [id number]    Subjective:  Afebrile  He is not ambulating well  Want to defer [facility] till he is healed from current condition  No constipation      Review of Systems  Respiratory: negative  Cardiovascular: negative  Gastrointestinal: negative  Hematologic/lymphatic: negative  Musculoskeletal:negative  Neurological: negative  Endocrine: negative    Scheduled Meds:   pregabalin  150 mg Oral BID    DULoxetine  30 mg Oral Daily    oxyCODONE  20 mg Oral 2 times per day    metFORMIN  500 mg Oral BID WC    enoxaparin  40 mg Subcutaneous Daily    sodium chloride flush  5-40 mL Intravenous 2 times per day    sennosides-docusate sodium  1 tablet Oral BID    polyethylene glycol  17 g Oral BID    nicotine  1 patch Transdermal Daily    acetaminophen  1,000 mg Oral TID    lamoTRIgine  100 mg Oral BID    sodium chloride flush  10 mL Intravenous 2 times per day    insulin lispro  0-6 Units Subcutaneous TID WC    insulin lispro  0-3 Units Subcutaneous Nightly    lisinopril  20 mg Oral Daily       Objective:  Vitals:    05/06/21 0815   BP: (!) 165/81   Pulse: 93   Resp: 17   Temp: 97.7 °F (36.5 °C)   SpO2: 96%         Allergies: Patient has no known allergies. General Appearance: alert and oriented to person, place and time and in no acute distress  Skin: no rash or erythema  Head: normocephalic and atraumatic  Pulmonary/Chest: clear to auscultation bilaterally- no wheezes, rales or rhonchi, normal air movement, no respiratory distress and no chest wall tenderness  Abdomen: soft, non-tender, non-distended, normal bowel sounds, no masses or organomegaly and no inguinal adenopathy. Large paaiculus  Genitalia: No penile or scrotal swelling or masses.  Extremities: no cyanosis, clubbing or edema and Mickey's sign negative bilaterally      Labs:     Recent Labs     05/06/21  0554      K 4.3   CL 97*   CO2 28   BUN 11   CREATININE 0.5*   GLUCOSE 176*   CALCIUM 9.2       Lab Results   Component Value Date    HGB 14.1 05/06/2021    HCT 45.3 05/06/2021         Assessment:  Shelly oRse 48 y.o. male     Principal Problem:    Burst fracture of twelfth thoracic vertebra (HCC)  Active Problems:    Hyponatremia    Diabetes mellitus (HCC)    Tobacco abuse    Adrenal nodule (HCC) - incidental    Spinal cord compression (HCC)    Acute thoracic back pain    Pathologic fracture    Spinal cord tumor  Resolved Problems:    * No resolved hospital problems. *    Bone scan no evidence for metastatic disease the changes seen are thought to be degenerative or related to previous known discogenic disease certainly he did have an area that was positive on epidural biopsy  I reviewed the bone scan with the pt    Plan:    Eventually [facility] medical oncology consult  Tissue dx from bx of epidural mass  Apparently needs insurance coverage before going to [facility].  That is limiting transfer at this time  Taty Shafer MD   [facility]  Urology

## 2021-05-06 NOTE — PROGRESS NOTES
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  Pt ambulated with no device independently PTA.     OBJECTIVE:    Re-Evaluation  Date: 2021 Treatment  21 Short Term/ Long Term   Goals   AM-PAC 6 Clicks 0     Was pt agreeable to Eval/treatment? Yes Yes      Does pt have pain?  Yes, pain increased with movement Severe Back and B thigh pain     Bed Mobility  Rolling: MaxA to R and L  Supine to sit: MaxA x 2 log roll  Sit to supine: MaxA x 2  Scooting: MaxA Rolling:  MaxA  Supine to sit: MaxA x 2  Sit to supine: MaxA x 2  Scooting: MaxA Logan   Transfers Sit to stand: MaxA x 2  Stand to sit: MaxA x 2  Stand pivot: NT Sit to stand: MaxA x 2 50% stance with [employer]  Full stance with B knee block MaxA x 2  Stand to sit: MaxA x 2  Stand pivot: NT-unable 2/2 strength deficits Logan with AAD, if needed   Ambulation   NT  Unable to take lateral steps -  >75 feet with Logan with AAD, if needed   Stair negotiation: ascended and descended NT   >4 steps with 2 rail Logan   ROM BUE:  Defer to OT note  BLE:  WFL       Strength BUE:  Defer to OT note  BLE:  Unable to assess due to poor tolerance to upright activity    Increase by 1/3 MMT grade   Balance Sitting EOB:  MaxA  Dynamic Standing:  MaxA x 2 no device  Sitting EOB:  MaxA (periods of Min)  Standing:  MaxA x 2 B knee block Sitting EOB: Logan  Dynamic Standing:  Logan with AAD, if needed      Pt is A & O x 4  Sensation:  No reported paresthesias  Edema:  None noted    Patient education  Pt educated on role of PT    Patient response to education:   Pt verbalized understanding Pt demonstrated skill Pt requires further education in this area   x x x     ASSESSMENT:    Comments:  Pt received in supine agreeable to PT. Pt performed rolling with assist and cues to don soft TLSO. Pt performed supine to sit transfer with assistance and cues, spinal neutral maintained. Pt requiring max cues to prevent posterior trunk lean at EOB. Encouraged use of UEs to support and improve static sitting balance. Pt tolerating ~ 8 min EOB. Pt achieved 50% sit to stand transfer using [employer], ( B knee buckling noted). Pt performed additional sit to stand with B knee block. Pt tolerated static stance ~ 10 seconds limited by strength deficits. Pt unable to ambulate laterally due to weakness. Pt slid over with assistance to stretcher bed to go for bone scan test. Pt responded well to encouragement and was highly motivated, demonstrated excellent effort motivation this session. Patient would benefit from continued skilled PT to maximize functional mobility independence. Educated in length on role of PT      Treatment:  Patient practiced and was instructed in the following treatment:     Bed mobility- max cues to facilitate independence   Functional transfers-Verbal cues for proper positioning and sequencing to perform transfers safely with maximum independence.  Neuromuscular reeducation- max cues EOB with some hand over hand facilitation. Encouraging use of UEs to stabilize self EOB. ~ 8 min.      PLAN:      PLAN OF CARE:    Current Treatment Recommendations     [x] Strengthening     [x] ROM   [x] Balance Training   [x] Endurance Training   [x] Transfer Training   [x] Gait Training   [x] Stair Training   [x] Positioning   [x] Safety and Education Training   [x] Patient/Caregiver Education   [x] HEP  [] Other       Patient is making good progress towards established goals. Will continue with current POC.       Time in  1020  Time out  1052    Total Treatment Time  25 minutes     CPT codes:  [] Gait training 30096 0 minutes  [] Manual therapy 04594 0 minutes  [x] Therapeutic activities 24579 25 minutes  [] Therapeutic exercises 10648 0 minutes  [] Neuromuscular reeducation 13916 0 minutes    Glendy Dodson PT, DPT  [id number]

## 2021-05-06 NOTE — CARE COORDINATION
5/6/2021 social work transition of care planning  Sw/CM followed up with pt (physician present) discussed issue with no coverage and transition of care planning. Pt stated that his plan is to [facility] out of here\". Cm to look into assisting pt with initiating cobra plan for future treatment. Pt going down for bone scan, will need to work with therapy. Sw will follow. Acute rehab can reassess if pt improves with therapy.   Electronically signed by AIYANA Hyde on 5/6/2021 at 11:11 AM

## 2021-05-06 NOTE — PROGRESS NOTES
Physical Therapy    Patient is currently on PT caseload and treatment attempt this AM.  Pt is off unit for bone scan. Will continue to follow and re-attempt as able. Thank you.   Elan Raya, PT, DPT  License [id number]

## 2021-05-07 PROBLEM — E27.8 ADRENAL MASS (HCC): Status: ACTIVE | Noted: 2021-04-27

## 2021-05-07 PROBLEM — C64.9: Chronic | Status: ACTIVE | Noted: 2021-05-07

## 2021-05-07 LAB
METER GLUCOSE: 187 MG/DL (ref 74–99)
METER GLUCOSE: 197 MG/DL (ref 74–99)
METER GLUCOSE: 206 MG/DL (ref 74–99)
METER GLUCOSE: 217 MG/DL (ref 74–99)

## 2021-05-07 PROCEDURE — 97530 THERAPEUTIC ACTIVITIES: CPT

## 2021-05-07 PROCEDURE — 2580000003 HC RX 258: Performed by: PHYSICIAN ASSISTANT

## 2021-05-07 PROCEDURE — 1200000000 HC SEMI PRIVATE

## 2021-05-07 PROCEDURE — 6370000000 HC RX 637 (ALT 250 FOR IP): Performed by: INTERNAL MEDICINE

## 2021-05-07 PROCEDURE — 6370000000 HC RX 637 (ALT 250 FOR IP): Performed by: PHYSICIAN ASSISTANT

## 2021-05-07 PROCEDURE — 6360000002 HC RX W HCPCS: Performed by: PHYSICIAN ASSISTANT

## 2021-05-07 PROCEDURE — 82962 GLUCOSE BLOOD TEST: CPT

## 2021-05-07 PROCEDURE — 97535 SELF CARE MNGMENT TRAINING: CPT

## 2021-05-07 PROCEDURE — 6360000002 HC RX W HCPCS: Performed by: INTERNAL MEDICINE

## 2021-05-07 RX ADMIN — METFORMIN HYDROCHLORIDE 500 MG: 500 TABLET ORAL at 08:51

## 2021-05-07 RX ADMIN — Medication 10 ML: at 09:00

## 2021-05-07 RX ADMIN — SENNOSIDES AND DOCUSATE SODIUM 1 TABLET: 8.6; 5 TABLET ORAL at 08:50

## 2021-05-07 RX ADMIN — OXYCODONE HYDROCHLORIDE 10 MG: 10 TABLET ORAL at 05:30

## 2021-05-07 RX ADMIN — Medication 10 ML: at 20:25

## 2021-05-07 RX ADMIN — OXYCODONE HYDROCHLORIDE 10 MG: 10 TABLET ORAL at 15:17

## 2021-05-07 RX ADMIN — SENNOSIDES AND DOCUSATE SODIUM 1 TABLET: 8.6; 5 TABLET ORAL at 20:23

## 2021-05-07 RX ADMIN — HYDROMORPHONE HYDROCHLORIDE 1 MG: 1 INJECTION, SOLUTION INTRAMUSCULAR; INTRAVENOUS; SUBCUTANEOUS at 13:17

## 2021-05-07 RX ADMIN — INSULIN LISPRO 1 UNITS: 100 INJECTION, SOLUTION INTRAVENOUS; SUBCUTANEOUS at 20:29

## 2021-05-07 RX ADMIN — INSULIN LISPRO 1 UNITS: 100 INJECTION, SOLUTION INTRAVENOUS; SUBCUTANEOUS at 17:40

## 2021-05-07 RX ADMIN — ACETAMINOPHEN 1000 MG: 500 TABLET, FILM COATED ORAL at 15:17

## 2021-05-07 RX ADMIN — HYDROMORPHONE HYDROCHLORIDE 1 MG: 1 INJECTION, SOLUTION INTRAMUSCULAR; INTRAVENOUS; SUBCUTANEOUS at 17:29

## 2021-05-07 RX ADMIN — Medication 10 ML: at 13:17

## 2021-05-07 RX ADMIN — LISINOPRIL 20 MG: 20 TABLET ORAL at 08:51

## 2021-05-07 RX ADMIN — DIAZEPAM 5 MG: 5 TABLET ORAL at 17:45

## 2021-05-07 RX ADMIN — LAMOTRIGINE 100 MG: 100 TABLET ORAL at 20:27

## 2021-05-07 RX ADMIN — OXYCODONE HYDROCHLORIDE 20 MG: 20 TABLET, FILM COATED, EXTENDED RELEASE ORAL at 08:51

## 2021-05-07 RX ADMIN — OXYCODONE HYDROCHLORIDE 20 MG: 20 TABLET, FILM COATED, EXTENDED RELEASE ORAL at 20:24

## 2021-05-07 RX ADMIN — INSULIN LISPRO 2 UNITS: 100 INJECTION, SOLUTION INTRAVENOUS; SUBCUTANEOUS at 08:54

## 2021-05-07 RX ADMIN — LAMOTRIGINE 100 MG: 100 TABLET ORAL at 08:51

## 2021-05-07 RX ADMIN — Medication 10 ML: at 17:31

## 2021-05-07 RX ADMIN — OXYCODONE HYDROCHLORIDE 10 MG: 10 TABLET ORAL at 01:10

## 2021-05-07 RX ADMIN — Medication 10 ML: at 08:50

## 2021-05-07 RX ADMIN — ENOXAPARIN SODIUM 40 MG: 40 INJECTION SUBCUTANEOUS at 08:49

## 2021-05-07 RX ADMIN — HYDROMORPHONE HYDROCHLORIDE 1 MG: 1 INJECTION, SOLUTION INTRAMUSCULAR; INTRAVENOUS; SUBCUTANEOUS at 08:52

## 2021-05-07 RX ADMIN — METFORMIN HYDROCHLORIDE 500 MG: 500 TABLET ORAL at 17:29

## 2021-05-07 RX ADMIN — DULOXETINE HYDROCHLORIDE 30 MG: 30 CAPSULE, DELAYED RELEASE ORAL at 08:51

## 2021-05-07 RX ADMIN — OXYCODONE HYDROCHLORIDE 10 MG: 10 TABLET ORAL at 20:24

## 2021-05-07 RX ADMIN — ACETAMINOPHEN 1000 MG: 500 TABLET, FILM COATED ORAL at 08:51

## 2021-05-07 RX ADMIN — INSULIN LISPRO 1 UNITS: 100 INJECTION, SOLUTION INTRAVENOUS; SUBCUTANEOUS at 12:30

## 2021-05-07 RX ADMIN — ACETAMINOPHEN 1000 MG: 500 TABLET, FILM COATED ORAL at 20:23

## 2021-05-07 RX ADMIN — HYDROMORPHONE HYDROCHLORIDE 1 MG: 1 INJECTION, SOLUTION INTRAMUSCULAR; INTRAVENOUS; SUBCUTANEOUS at 03:46

## 2021-05-07 RX ADMIN — HYDROMORPHONE HYDROCHLORIDE 1 MG: 1 INJECTION, SOLUTION INTRAMUSCULAR; INTRAVENOUS; SUBCUTANEOUS at 22:25

## 2021-05-07 RX ADMIN — OXYCODONE HYDROCHLORIDE 10 MG: 10 TABLET ORAL at 10:26

## 2021-05-07 RX ADMIN — PREGABALIN 150 MG: 150 CAPSULE ORAL at 20:23

## 2021-05-07 RX ADMIN — PREGABALIN 150 MG: 150 CAPSULE ORAL at 08:50

## 2021-05-07 ASSESSMENT — PAIN DESCRIPTION - ONSET
ONSET: ON-GOING

## 2021-05-07 ASSESSMENT — PAIN DESCRIPTION - FREQUENCY
FREQUENCY: CONTINUOUS

## 2021-05-07 ASSESSMENT — PAIN DESCRIPTION - DESCRIPTORS
DESCRIPTORS: ACHING;CONSTANT
DESCRIPTORS: ACHING;CONSTANT;DISCOMFORT
DESCRIPTORS: ACHING;CONSTANT
DESCRIPTORS: ACHING;CONSTANT;DISCOMFORT
DESCRIPTORS: ACHING;CONSTANT;DISCOMFORT

## 2021-05-07 ASSESSMENT — PAIN DESCRIPTION - ORIENTATION
ORIENTATION: RIGHT;LEFT
ORIENTATION: RIGHT;LEFT
ORIENTATION: LEFT
ORIENTATION: RIGHT;LEFT

## 2021-05-07 ASSESSMENT — PAIN - FUNCTIONAL ASSESSMENT: PAIN_FUNCTIONAL_ASSESSMENT: PREVENTS OR INTERFERES SOME ACTIVE ACTIVITIES AND ADLS

## 2021-05-07 ASSESSMENT — PAIN DESCRIPTION - LOCATION
LOCATION: BACK;LEG
LOCATION: LEG
LOCATION: BACK;LEG
LOCATION: LEG
LOCATION: BACK;LEG
LOCATION: BACK;LEG
LOCATION: BACK

## 2021-05-07 ASSESSMENT — PAIN DESCRIPTION - PROGRESSION
CLINICAL_PROGRESSION: GRADUALLY IMPROVING
CLINICAL_PROGRESSION: NOT CHANGED
CLINICAL_PROGRESSION: NOT CHANGED
CLINICAL_PROGRESSION: GRADUALLY IMPROVING
CLINICAL_PROGRESSION: NOT CHANGED
CLINICAL_PROGRESSION: NOT CHANGED

## 2021-05-07 ASSESSMENT — PAIN SCALES - GENERAL
PAINLEVEL_OUTOF10: 0
PAINLEVEL_OUTOF10: 7
PAINLEVEL_OUTOF10: 0
PAINLEVEL_OUTOF10: 8
PAINLEVEL_OUTOF10: 9
PAINLEVEL_OUTOF10: 9
PAINLEVEL_OUTOF10: 0
PAINLEVEL_OUTOF10: 10
PAINLEVEL_OUTOF10: 8
PAINLEVEL_OUTOF10: 9
PAINLEVEL_OUTOF10: 9
PAINLEVEL_OUTOF10: 6
PAINLEVEL_OUTOF10: 8
PAINLEVEL_OUTOF10: 6
PAINLEVEL_OUTOF10: 9

## 2021-05-07 ASSESSMENT — PAIN DESCRIPTION - PAIN TYPE
TYPE: ACUTE PAIN

## 2021-05-07 NOTE — PROGRESS NOTES
Pain Management Progress Note      CHIEF COMPLAINT: Post-operative pain S/p T12 kyphoplasty/laminectomy      SUBJECTIVE: Pt reports improved mid to low back radiating down legs with medication adjustments and changes started on 5/5/21. Pt still requires Dilaudid IV for breakthrough pain every 5-6 hours. Pt appears to be progressing with physical therapy towards established goals. Pt now able to tolerate sitting at the end of the bed but still unable to ambulate without severe pain. Pt understands to request Dilaudid IV only when pain severe and not controlled with po pain medication.     REVIEW OF SYSTEMS:   Constitutional: Negative for chills and fever. Neurological: Negative for dizziness, tremors and speech change.      OBJECTIVE:    BP (!) 140/76   Pulse 99   Temp 98.1 °F (36.7 °C) (Temporal)   Resp 16   Ht 6' (1.829 m)   Wt 283 lb (128.4 kg)   SpO2 95%   BMI 38.38 kg/m²      PHYSICAL:   CONSTITUTIONAL: awake, alert, cooperative, mild distress. Pt lying in bed upright and in mild distress. 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 Continue with pain medication regimen per previous recommendations. 2. With the transfer in place to [facility] in a few days I do not recommend changing pain medication regimen at this time because I will not be able to sufficiently follow and assess patient response post change.    3. I recommend further modification to pain medication regimen once established with the pain team at [facility]. 4. Continue with physical and occupational therapy as tolerated to prepare for transfer to F oncology/hematology at [facility]   4.  NOT a candidate for NSAIDs with recent start of Lovenox     Thank you for the referral.     yes

## 2021-05-07 NOTE — PROGRESS NOTES
Standing:  MaxA x 2 no device  Sitting EOB:  MaxA (periods of Min)  Standing:  MaxA x 2 B knee block (nishant walker) Sitting EOB: Logan  Dynamic Standing:  Logan with AAD, if needed      Pt is A & O x 4  Sensation:  No reported paresthesias  Edema:  None noted     Therapeutic Exercises:  1x 10 quad set   1x 10 heel slide AAROM     Patient education  Pt educated on role of PT    Patient response to education:   Pt verbalized understanding Pt demonstrated skill Pt requires further education in this area   x x x     ASSESSMENT:    Comments:  Pt received in supine agreeable to PT. Pt motivated to participate. Pt performing bed mobility with assist and cues to don soft TLSO. Pt required assist of BLEs and trunk for supine to sit transfer. Pt sitting statically relying heavily on BUE support. Pt sits periodically unassisted but will lose balance intermittently requiring assist to recover. Pt tolerating ~ 15 min EOB. Pt performed sit to stand with B knee block tolerated ~ 10 sec static stance. Pt performed additional sit to stand with B knee block using nishant walker. Pt tolerated ~ 15 sec static stance. Pt performed lateral scooting with assistance and cues. Patient would benefit from continued skilled PT to maximize functional mobility independence. Treatment:  Patient practiced and was instructed in the following treatment:     Bed mobility- verbal cues to facilitate independence   Functional transfers-Verbal cues for proper positioning and sequencing to perform transfers safely with maximum independence.  Neuromuscular reeducation- max cues to facilitate independence and place UE in appropriate position. ~ 15 min.     PLAN:      PLAN OF CARE:    Current Treatment Recommendations     [x] Strengthening     [x] ROM   [x] Balance Training   [x] Endurance Training   [x] Transfer Training   [x] Gait Training   [] Stair Training   [x] Positioning   [x] Safety and Education Training   [x] Patient/Caregiver Education   [x]

## 2021-05-07 NOTE — PROGRESS NOTES
05/06/2021    GLUCOSE 176 05/06/2021    PROT 6.8 04/28/2021    LABALBU 3.5 04/28/2021    CALCIUM 9.2 05/06/2021    BILITOT <0.2 04/28/2021    ALKPHOS 76 04/28/2021    AST 10 04/28/2021    ALT 13 04/28/2021        Assessment/Plan:  Principal Problem:    Sarcomatoid renal cell carcinoma (HCC)  Active Problems:    Hyponatremia    Diabetes mellitus (HCC)    Tobacco abuse    Adrenal nodule (HCC)    Burst fracture of twelfth thoracic vertebra (HCC)    Spinal cord compression (HCC)    Acute thoracic back pain    Pathologic fracture    Spinal cord tumor  Resolved Problems:    * No resolved hospital problems. *       Path is back showing right sided high grade sarcomatoid renal call carcinoma. R adrenal mass of unclear significance    Discussed case in detail with heme-onc and neurosurgery    Per my discussion with heme-onc, this disease is aggressive and carries a poor prognosis. He needs timely consideration of debulking nephrectomy and follow-up radiation/chemo at a quaternary referral center that has some experience dealing with this. Due to mobility issues and socioeconomic factors, I doubt he can have timely outpatient referral to [facility] he was refusing transfer, but after talking with his wife, he has decided to agree to transfer to [facility]. I think the main issue is that his wife is also extremely chronically ill so he wanted to be closer to her, but she will get her son to help out. Spoke with [facility] heme-onc service, they have accepted him    Glucose well controlled    Pain management consult appreciated    Time spent: 40 minutes, at least 50% was on face-to-face counseling and coordination of care.    Requires continued inpatient level of care   Josseline Yen    1:53 PM  5/7/2021

## 2021-05-07 NOTE — PROGRESS NOTES
Department of Neurosurgery  Progress Note    CHIEF COMPLAINT: s/p T12 kyphoplasty/laminectomy    SUBJECTIVE:  Minimal op site pain. C/o bilateral anterior thigh pain    REVIEW OF SYSTEMS :  Constitutional: Negative for chills and fever. Neurological: Negative for dizziness, tremors and speech change. OBJECTIVE:   VITALS:  BP (!) 140/76   Pulse 99   Temp 98.1 °F (36.7 °C) (Temporal)   Resp 16   Ht 6' (1.829 m)   Wt 283 lb (128.4 kg)   SpO2 95%   BMI 38.38 kg/m²   PHYSICAL:  CONSTITUTIONAL:  awake, alert, cooperative, no apparent distress, and appears stated age and FINE/FC.   3/5 iliopsoas and quad strength    DATA:  CBC:   Lab Results   Component Value Date    WBC 10.3 05/06/2021    RBC 5.20 05/06/2021    HGB 14.1 05/06/2021    HCT 45.3 05/06/2021    MCV 87.1 05/06/2021    MCH 27.1 05/06/2021    MCHC 31.1 05/06/2021    RDW 12.9 05/06/2021     05/06/2021    MPV 10.2 05/06/2021     BMP:    Lab Results   Component Value Date     05/06/2021    K 4.3 05/06/2021    K 4.4 04/29/2021    CL 97 05/06/2021    CO2 28 05/06/2021    BUN 11 05/06/2021    LABALBU 3.5 04/28/2021    CREATININE 0.5 05/06/2021    CALCIUM 9.2 05/06/2021    GFRAA >60 05/06/2021    LABGLOM >60 05/06/2021    GLUCOSE 176 05/06/2021     PT/INR:    Lab Results   Component Value Date    PROTIME 12.2 04/30/2021    INR 1.1 04/30/2021     PTT:    Lab Results   Component Value Date    APTT 33.8 04/30/2021   [APTT}    Current Inpatient Medications  Current Facility-Administered Medications: enoxaparin (LOVENOX) injection 30 mg, 30 mg, Subcutaneous, BID  pregabalin (LYRICA) capsule 150 mg, 150 mg, Oral, BID  DULoxetine (CYMBALTA) extended release capsule 30 mg, 30 mg, Oral, Daily  oxyCODONE (OXYCONTIN) extended release tablet 20 mg, 20 mg, Oral, 2 times per day  [DISCONTINUED] oxyCODONE (ROXICODONE) immediate release tablet 5 mg, 5 mg, Oral, Q4H PRN **OR** oxyCODONE HCl (OXY-IR) immediate release tablet 10 mg, 10 mg, Oral, Q4H PRN  metFORMIN (GLUCOPHAGE) tablet 500 mg, 500 mg, Oral, BID WC  enoxaparin (LOVENOX) injection 40 mg, 40 mg, Subcutaneous, Daily  bisacodyl (DULCOLAX) suppository 10 mg, 10 mg, Rectal, Daily PRN  HYDROmorphone (DILAUDID) injection 1 mg, 1 mg, Intravenous, Q4H PRN **OR** [DISCONTINUED] HYDROmorphone (DILAUDID) injection 0.5 mg, 0.5 mg, Intravenous, Q4H PRN  sodium chloride flush 0.9 % injection 5-40 mL, 5-40 mL, Intravenous, 2 times per day  diazePAM (VALIUM) tablet 5 mg, 5 mg, Oral, Q6H PRN  sodium chloride flush 0.9 % injection 5-40 mL, 5-40 mL, Intravenous, PRN  0.9 % sodium chloride infusion, 25 mL, Intravenous, PRN  promethazine (PHENERGAN) tablet 12.5 mg, 12.5 mg, Oral, Q6H PRN **OR** ondansetron (ZOFRAN) injection 4 mg, 4 mg, Intravenous, Q6H PRN  sennosides-docusate sodium (SENOKOT-S) 8.6-50 MG tablet 1 tablet, 1 tablet, Oral, BID  polyethylene glycol (GLYCOLAX) packet 17 g, 17 g, Oral, BID  nicotine (NICODERM CQ) 21 MG/24HR 1 patch, 1 patch, Transdermal, Daily  acetaminophen (TYLENOL) tablet 1,000 mg, 1,000 mg, Oral, TID  lamoTRIgine (LAMICTAL) tablet 100 mg, 100 mg, Oral, BID  sodium chloride flush 0.9 % injection 10 mL, 10 mL, Intravenous, 2 times per day  sodium chloride flush 0.9 % injection 10 mL, 10 mL, Intravenous, PRN  0.9 % sodium chloride infusion, 25 mL, Intravenous, PRN  potassium chloride (KLOR-CON M) extended release tablet 40 mEq, 40 mEq, Oral, PRN **OR** potassium bicarb-citric acid (EFFER-K) effervescent tablet 40 mEq, 40 mEq, Oral, PRN **OR** potassium chloride 10 mEq/100 mL IVPB (Peripheral Line), 10 mEq, Intravenous, PRN  magnesium hydroxide (MILK OF MAGNESIA) 400 MG/5ML suspension 30 mL, 30 mL, Oral, Daily PRN  glucose (GLUTOSE) 40 % oral gel 15 g, 15 g, Oral, PRN  dextrose 50 % IV solution, 12.5 g, Intravenous, PRN  glucagon (rDNA) injection 1 mg, 1 mg, Intramuscular, PRN  dextrose 5 % solution, 100 mL/hr, Intravenous, PRN  insulin lispro (HUMALOG) injection vial 0-6 Units, 0-6 Units, Subcutaneous, TID WC  insulin lispro (HUMALOG) injection vial 0-3 Units, 0-3 Units, Subcutaneous, Nightly  trimethobenzamide (TIGAN) injection 200 mg, 200 mg, Intramuscular, Q6H PRN  lisinopril (PRINIVIL;ZESTRIL) tablet 20 mg, 20 mg, Oral, Daily    ASSESSMENT:   · S/p T12 kyphoplasty and T12 laminectomy for tumor decompression on 4/30    PLAN:  · WBAT with TLSO  · Pain control. Seen by pain management.   · lovenox   · Oncology consulted      Electronically signed by BLESSING Knight on 5/7/2021 at 10:45 AM

## 2021-05-07 NOTE — PROGRESS NOTES
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

## 2021-05-07 NOTE — PROGRESS NOTES
Patient is doing better today. He has some hope because it sounds like he will qualify for Medicaid as soon as his unemployment runs out (I'm told next month). He spoke to his wife and she convinced him to try to go to [facility].     Will call [facility]

## 2021-05-07 NOTE — PLAN OF CARE
Problem: Pain:  Goal: Pain level will decrease  Description: Pain level will decrease  Outcome: Not Met This Shift  Goal: Control of acute pain  Description: Control of acute pain  Outcome: Not Met This Shift  Goal: Control of chronic pain  Description: Control of chronic pain  Outcome: Not Met This Shift     Problem: Falls - Risk of:  Goal: Will remain free from falls  Description: Will remain free from falls  Outcome: Met This Shift  Goal: Absence of physical injury  Description: Absence of physical injury  Outcome: Met This Shift     Problem: Neurological  Goal: Maximum potential motor/sensory/cognitive function  Outcome: Ongoing

## 2021-05-07 NOTE — PROGRESS NOTES
Subjective:  Patient is alert laying in the bed. Discussed prognosis once again along with  recommendations. He states repeating ly that he is unsure what he wants to do due to his wife being sick and fiancinal issues. Ideally,the sooner he could get to [facility] to be evaluated would better his outcome. He understands this and verbalized he understood. Pain is controlled. Objective:    BP (!) 158/88   Pulse 90   Temp 98.4 °F (36.9 °C) (Temporal)   Resp 16   Ht 6' (1.829 m)   Wt 283 lb (128.4 kg)   SpO2 94%   BMI 38.38 kg/m²     General: NAD, obese. HEENT: No thrush or mucositis, EOMI, PERRLA  Heart:  RRR, no murmurs, gallops, or rubs.   Lungs:  CTA bilaterally, no wheeze, rales or rhonchi  Abd: bowel sounds present, nontender, nondistended, no masses  Extrem:  No clubbing, cyanosis, or edema  Lymphatics: No palpable adenopathy in cervical and supraclavicular regions  Skin: Intact, no petechia or purpura    CBC with Differential:    Lab Results   Component Value Date    WBC 10.3 05/06/2021    RBC 5.20 05/06/2021    HGB 14.1 05/06/2021    HCT 45.3 05/06/2021     05/06/2021    MCV 87.1 05/06/2021    MCH 27.1 05/06/2021    MCHC 31.1 05/06/2021    RDW 12.9 05/06/2021    LYMPHOPCT 19.3 05/06/2021    MONOPCT 9.2 05/06/2021    BASOPCT 0.6 05/06/2021    MONOSABS 0.95 05/06/2021    LYMPHSABS 2.00 05/06/2021    EOSABS 0.50 05/06/2021    BASOSABS 0.06 05/06/2021     CMP:    Lab Results   Component Value Date     05/06/2021    K 4.3 05/06/2021    K 4.4 04/29/2021    CL 97 05/06/2021    CO2 28 05/06/2021    BUN 11 05/06/2021    CREATININE 0.5 05/06/2021    GFRAA >60 05/06/2021    LABGLOM >60 05/06/2021    GLUCOSE 176 05/06/2021    PROT 6.8 04/28/2021    LABALBU 3.5 04/28/2021    CALCIUM 9.2 05/06/2021    BILITOT <0.2 04/28/2021    ALKPHOS 76 04/28/2021    AST 10 04/28/2021    ALT 13 04/28/2021         Current Facility-Administered Medications:     pregabalin (LYRICA) capsule 150 mg, 150 mg, Oral, BID, Gloria Hennessy MD, 150 mg at 05/06/21 2021    DULoxetine (CYMBALTA) extended release capsule 30 mg, 30 mg, Oral, Daily, Gloria Hennessy MD, 30 mg at 05/06/21 8226    oxyCODONE (OXYCONTIN) extended release tablet 20 mg, 20 mg, Oral, 2 times per day, Gloria Hennessy MD, 20 mg at 05/06/21 2021    [DISCONTINUED] oxyCODONE (ROXICODONE) immediate release tablet 5 mg, 5 mg, Oral, Q4H PRN **OR** oxyCODONE HCl (OXY-IR) immediate release tablet 10 mg, 10 mg, Oral, Q4H PRN, Gloria Hennessy MD, 10 mg at 05/07/21 0530    metFORMIN (GLUCOPHAGE) tablet 500 mg, 500 mg, Oral, BID WC, Gloria Hennessy MD, 500 mg at 05/06/21 1742    enoxaparin (LOVENOX) injection 40 mg, 40 mg, Subcutaneous, Daily, Ashlyn Hudson PA-C, 40 mg at 05/06/21 0830    bisacodyl (DULCOLAX) suppository 10 mg, 10 mg, Rectal, Daily PRN, BLESSING Sarah    HYDROmorphone (DILAUDID) injection 1 mg, 1 mg, Intravenous, Q4H PRN, 1 mg at 05/07/21 0346 **OR** [DISCONTINUED] HYDROmorphone (DILAUDID) injection 0.5 mg, 0.5 mg, Intravenous, Q4H PRN, Gloria Hennessy MD    sodium chloride flush 0.9 % injection 5-40 mL, 5-40 mL, Intravenous, 2 times per day, BLESSING Sarah, 10 mL at 05/04/21 0920    diazePAM (VALIUM) tablet 5 mg, 5 mg, Oral, Q6H PRN, BLESSING Sarah, 5 mg at 05/06/21 1541    sodium chloride flush 0.9 % injection 5-40 mL, 5-40 mL, Intravenous, PRN, BLESSING Sarah    0.9 % sodium chloride infusion, 25 mL, Intravenous, PRN, BLESSING Sarah    promethazine (PHENERGAN) tablet 12.5 mg, 12.5 mg, Oral, Q6H PRN **OR** ondansetron (ZOFRAN) injection 4 mg, 4 mg, Intravenous, Q6H PRN, BLESSING Sarah    sennosides-docusate sodium (SENOKOT-S) 8.6-50 MG tablet 1 tablet, 1 tablet, Oral, BID, BLESSING Sarah, 1 tablet at 05/06/21 2021    polyethylene glycol (GLYCOLAX) packet 17 g, 17 g, Oral, BID, BLESSING Sarah, 17 g at 05/03/21 0939    nicotine (NICODERM CQ) 21 MG/24HR 1 patch, 1 patch, Transdermal, Daily, Eric Guzmán PA, 1 patch at 05/06/21 0831    acetaminophen (TYLENOL) tablet 1,000 mg, 1,000 mg, Oral, TID, BLESSING Herndon, 1,000 mg at 05/06/21 2021    lamoTRIgine (LAMICTAL) tablet 100 mg, 100 mg, Oral, BID, BLESSING Herndon, 100 mg at 05/06/21 2020    sodium chloride flush 0.9 % injection 10 mL, 10 mL, Intravenous, 2 times per day, BLESSING Herndon, 10 mL at 05/06/21 2021    sodium chloride flush 0.9 % injection 10 mL, 10 mL, Intravenous, PRN, Susie Salazar PA, 10 mL at 05/06/21 1856    0.9 % sodium chloride infusion, 25 mL, Intravenous, PRN, BLESSING Herndon    potassium chloride (KLOR-CON M) extended release tablet 40 mEq, 40 mEq, Oral, PRN **OR** potassium bicarb-citric acid (EFFER-K) effervescent tablet 40 mEq, 40 mEq, Oral, PRN **OR** potassium chloride 10 mEq/100 mL IVPB (Peripheral Line), 10 mEq, Intravenous, PRN, BLESSING Herndon    magnesium hydroxide (MILK OF MAGNESIA) 400 MG/5ML suspension 30 mL, 30 mL, Oral, Daily PRN, BLESSING Herndon    glucose (GLUTOSE) 40 % oral gel 15 g, 15 g, Oral, PRN, Susie Salazar, PA    dextrose 50 % IV solution, 12.5 g, Intravenous, PRN, Susie [facility], PA    glucagon (rDNA) injection 1 mg, 1 mg, Intramuscular, PRN, BLESSING Herndon    dextrose 5 % solution, 100 mL/hr, Intravenous, PRN, Susie Salazar, PA    insulin lispro (HUMALOG) injection vial 0-6 Units, 0-6 Units, Subcutaneous, TID WC, BLESSING Herndon, 1 Units at 05/06/21 1213    insulin lispro (HUMALOG) injection vial 0-3 Units, 0-3 Units, Subcutaneous, Nightly, BLESSING Herndon, 1 Units at 05/06/21 2022    trimethobenzamide (TIGAN) injection 200 mg, 200 mg, Intramuscular, Q6H PRN, BLESSING Herndon    lisinopril (PRINIVIL;ZESTRIL) tablet 20 mg, 20 mg, Oral, Daily, BLESSING Herndon, 20 mg at 05/06/21 0304    Assessment:    Principal Problem:    Burst fracture of twelfth thoracic vertebra [facility])  Active Problems:    Hyponatremia    [facility] mellitus ([facility]

## 2021-05-07 NOTE — PLAN OF CARE
Problem: Pain:  Goal: Pain level will decrease  Description: Pain level will decrease  5/7/2021 0237 by Elvis Slater RN  Outcome: Met This Shift     Problem: Falls - Risk of:  Goal: Will remain free from falls  Description: Will remain free from falls  5/7/2021 0237 by Elvis Slater RN  Outcome: Met This Shift     Problem: Falls - Risk of:  Goal: Absence of physical injury  Description: Absence of physical injury  5/7/2021 0237 by Elvis Slater RN  Outcome: Met This Shift

## 2021-05-07 NOTE — CARE COORDINATION
5/7/2021 social work transition of care planning  Physician made referral to [employer] acceptance or denial. Per PB, they will apply for Medicaid. Pt is HFA eligible,but not eligible for help with meds. Sw will follow. Electronically signed by AIYANA Mitchell on 5/7/2021 at 12:17 PM     Addendum: Per physician note, Pt accepted at [facility]. Envelope in soft chart.   Electronically signed by AIYANA Mitchell on 5/7/2021 at 2:36 PM

## 2021-05-07 NOTE — PROGRESS NOTES
Occupational Therapy  OT BEDSIDE TREATMENT NOTE      Date:2021  Patient Name: Shelly Rose  MRN: [id number]  : 1965  Room: [address] [place] [address]     Evaluating OT: SAMUEL Rojas/L   Referring provider: BLESSING Hutchins     AM-PAC Daily Activity Raw Score:   Recommended Adaptive Equipment: continue to assess     Diagnosis: Thoracic Back Pain   Surgery: S/p T12 kyphoplasty and T12 laminectomy for tumor decompression on     Pertinent Medical History: Arthritis, DM, CTS  Additional information: pt being re-evaluated s/p surgery      Precautions:  Falls, Soft TLSO,HOB < 45*, Spinal precautions      Home Living: Pt lives with wife in a 1 story home with no steps to enter and no HR, ramp to enter. .  Bathroom setup: tub/shower, std. Commodes, walk in shower in basement   Equipment owned: none     Prior Level of Function: Ind. with ADLs , Ind. with IADLs; ambulated no A.D. Driving: active  Occupation:      Pain Level: severe pain thighs & low back, L hip, medication given prior to treatment to achieve most relief for therapeutic tasks     Cognition: A&O: 3; Follows 1-2 step directions, pleasant & cooperative               Memory:Fair              Sequencing: Fair              Problem solving: Fair              Judgement/safety: F- anxious, fearful, hard to focus due to pain                 Functional Assessment:    Initial Eval Status  Date:  Treatment Status  Date:  21 Short Term Goals  Treatment frequency: 1-3x/week on PRN    Feeding Setup A Mod Indep     Grooming/Hygiene Mod I bed level Set up   Upright in bed   Mod I seated in chair       UB Dressing Mod A Mod A  To adjust gown   Dep  marivel TLSO   Mod I seated       LB Dressing DEP Max A  To don/doff socks supine in bed   Mod A    Bathing Max A Max A  simulated      Toileting DEP Max A  Using urinal bed level  Mod A   Bed Mobility  Supine to sit:  Max Ax2  Sit to supine: Max Ax2  Max A- rolling  Max A x 2- supine<->sit  Educated pt on technique to increase independence.         Functional Transfers Sit to stand: Max Ax2  Stand to sit: Max Ax2  Assist of 3rd person d/t BLE buckling  Max A x 2   sit<->stand  Cuing for hand placement and body mechanics with bilateral knees blocked, B knees blocked Mod A   Functional Mobility NT N/T   Mod A    Balance Sitting:      Static: Max A    Dynamic:Max A  Standing: Max Ax2  Sitting:      Static: Max - Min A  Using B UE support    Dynamic: Max A  Standing: Max A x 2  Blocking B LE  2nd stand cardiac  walker     Endurance/Activity Tolerance F-; limited by pain and weakness  Fair-  Limited by pain   Anxious, requires encouragement, reassurance & simple instructions  F+     Visual/  Perceptual Glasses: No; WFL          UE strengthening     See UE Assessment Below  Fair   Light AROM B UE G tolerance  For BUE AROM/AAROM exercises in all planes to improve overall function for ADL tasks      Comments: Upon arrival pt semi upright in bed & agreeable for therapy. Educated pt on spinal precautions, demonstrates Fair understanding. Pt educated on adaptive techniques to increase independence and safety during ADL's, bed mobility, and functional transfers while maintaining precautions. At end of session pt was returned to bed, with TLSO brace in place, HOB upright, all lines in place & call light within reach. · Pt has made slow progress towards set goals. · Continue with current plan of care    Treatment Time In: 9:39          Treatment Time Out: 10:08           Treatment Charges: Mins Units   Ther Ex  [id number]     Manual Therapy [id number]     Thera Activities 01955 19 1   ADL/Home Mgt [id number] 10 1   Neuro Re-ed [id number]     Group Therapy      Orthotic manage/training  [id number]     Non-Billable Time     Total Timed Treatment 29 2       Kamini OLGA LIDIA  [address] [place] [address] Drive, [address] [place] [address]

## 2021-05-08 LAB
ALBUMIN SERPL-MCNC: 3.2 G/DL (ref 3.5–5.2)
ALP BLD-CCNC: 83 U/L (ref 40–129)
ALT SERPL-CCNC: 10 U/L (ref 0–40)
ANION GAP SERPL CALCULATED.3IONS-SCNC: 10 MMOL/L (ref 7–16)
AST SERPL-CCNC: 10 U/L (ref 0–39)
BASOPHILS ABSOLUTE: 0.08 E9/L (ref 0–0.2)
BASOPHILS RELATIVE PERCENT: 0.8 % (ref 0–2)
BILIRUB SERPL-MCNC: 0.2 MG/DL (ref 0–1.2)
BUN BLDV-MCNC: 11 MG/DL (ref 6–20)
CALCIUM SERPL-MCNC: 9.5 MG/DL (ref 8.6–10.2)
CHLORIDE BLD-SCNC: 95 MMOL/L (ref 98–107)
CO2: 32 MMOL/L (ref 22–29)
CREAT SERPL-MCNC: 0.6 MG/DL (ref 0.7–1.2)
EOSINOPHILS ABSOLUTE: 0.47 E9/L (ref 0.05–0.5)
EOSINOPHILS RELATIVE PERCENT: 4.8 % (ref 0–6)
GFR AFRICAN AMERICAN: >60
GFR NON-AFRICAN AMERICAN: >60 ML/MIN/1.73
GLUCOSE BLD-MCNC: 150 MG/DL (ref 74–99)
HCT VFR BLD CALC: 44.1 % (ref 37–54)
HEMOGLOBIN: 14 G/DL (ref 12.5–16.5)
IMMATURE GRANULOCYTES #: 0.04 E9/L
IMMATURE GRANULOCYTES %: 0.4 % (ref 0–5)
LYMPHOCYTES ABSOLUTE: 3.06 E9/L (ref 1.5–4)
LYMPHOCYTES RELATIVE PERCENT: 31.2 % (ref 20–42)
MCH RBC QN AUTO: 27.2 PG (ref 26–35)
MCHC RBC AUTO-ENTMCNC: 31.7 % (ref 32–34.5)
MCV RBC AUTO: 85.6 FL (ref 80–99.9)
METER GLUCOSE: 150 MG/DL (ref 74–99)
METER GLUCOSE: 159 MG/DL (ref 74–99)
METER GLUCOSE: 166 MG/DL (ref 74–99)
METER GLUCOSE: 174 MG/DL (ref 74–99)
MONOCYTES ABSOLUTE: 0.98 E9/L (ref 0.1–0.95)
MONOCYTES RELATIVE PERCENT: 10 % (ref 2–12)
NEUTROPHILS ABSOLUTE: 5.19 E9/L (ref 1.8–7.3)
NEUTROPHILS RELATIVE PERCENT: 52.8 % (ref 43–80)
PDW BLD-RTO: 12.8 FL (ref 11.5–15)
PLATELET # BLD: 354 E9/L (ref 130–450)
PMV BLD AUTO: 9.8 FL (ref 7–12)
POTASSIUM SERPL-SCNC: 4.4 MMOL/L (ref 3.5–5)
RBC # BLD: 5.15 E12/L (ref 3.8–5.8)
SODIUM BLD-SCNC: 137 MMOL/L (ref 132–146)
TOTAL PROTEIN: 6.8 G/DL (ref 6.4–8.3)
WBC # BLD: 9.8 E9/L (ref 4.5–11.5)

## 2021-05-08 PROCEDURE — 2580000003 HC RX 258: Performed by: PHYSICIAN ASSISTANT

## 2021-05-08 PROCEDURE — 80053 COMPREHEN METABOLIC PANEL: CPT

## 2021-05-08 PROCEDURE — 1200000000 HC SEMI PRIVATE

## 2021-05-08 PROCEDURE — 6360000002 HC RX W HCPCS: Performed by: PHYSICIAN ASSISTANT

## 2021-05-08 PROCEDURE — 6370000000 HC RX 637 (ALT 250 FOR IP): Performed by: INTERNAL MEDICINE

## 2021-05-08 PROCEDURE — 82962 GLUCOSE BLOOD TEST: CPT

## 2021-05-08 PROCEDURE — 6360000002 HC RX W HCPCS: Performed by: INTERNAL MEDICINE

## 2021-05-08 PROCEDURE — 85025 COMPLETE CBC W/AUTO DIFF WBC: CPT

## 2021-05-08 PROCEDURE — 6370000000 HC RX 637 (ALT 250 FOR IP): Performed by: PHYSICIAN ASSISTANT

## 2021-05-08 PROCEDURE — 36415 COLL VENOUS BLD VENIPUNCTURE: CPT

## 2021-05-08 RX ADMIN — Medication 10 ML: at 20:47

## 2021-05-08 RX ADMIN — ENOXAPARIN SODIUM 40 MG: 40 INJECTION SUBCUTANEOUS at 08:27

## 2021-05-08 RX ADMIN — INSULIN LISPRO 1 UNITS: 100 INJECTION, SOLUTION INTRAVENOUS; SUBCUTANEOUS at 08:32

## 2021-05-08 RX ADMIN — Medication 10 ML: at 08:27

## 2021-05-08 RX ADMIN — LAMOTRIGINE 100 MG: 100 TABLET ORAL at 20:44

## 2021-05-08 RX ADMIN — INSULIN LISPRO 1 UNITS: 100 INJECTION, SOLUTION INTRAVENOUS; SUBCUTANEOUS at 17:18

## 2021-05-08 RX ADMIN — PREGABALIN 150 MG: 150 CAPSULE ORAL at 20:44

## 2021-05-08 RX ADMIN — ACETAMINOPHEN 1000 MG: 500 TABLET, FILM COATED ORAL at 13:05

## 2021-05-08 RX ADMIN — LAMOTRIGINE 100 MG: 100 TABLET ORAL at 08:28

## 2021-05-08 RX ADMIN — METFORMIN HYDROCHLORIDE 500 MG: 500 TABLET ORAL at 08:28

## 2021-05-08 RX ADMIN — OXYCODONE HYDROCHLORIDE 10 MG: 10 TABLET ORAL at 22:36

## 2021-05-08 RX ADMIN — DIAZEPAM 5 MG: 5 TABLET ORAL at 00:25

## 2021-05-08 RX ADMIN — SENNOSIDES AND DOCUSATE SODIUM 1 TABLET: 8.6; 5 TABLET ORAL at 08:27

## 2021-05-08 RX ADMIN — Medication 10 ML: at 08:30

## 2021-05-08 RX ADMIN — DIAZEPAM 5 MG: 5 TABLET ORAL at 05:39

## 2021-05-08 RX ADMIN — HYDROMORPHONE HYDROCHLORIDE 1 MG: 1 INJECTION, SOLUTION INTRAMUSCULAR; INTRAVENOUS; SUBCUTANEOUS at 15:45

## 2021-05-08 RX ADMIN — DULOXETINE HYDROCHLORIDE 30 MG: 30 CAPSULE, DELAYED RELEASE ORAL at 08:28

## 2021-05-08 RX ADMIN — INSULIN LISPRO 1 UNITS: 100 INJECTION, SOLUTION INTRAVENOUS; SUBCUTANEOUS at 13:05

## 2021-05-08 RX ADMIN — OXYCODONE HYDROCHLORIDE 10 MG: 10 TABLET ORAL at 05:39

## 2021-05-08 RX ADMIN — ACETAMINOPHEN 1000 MG: 500 TABLET, FILM COATED ORAL at 20:45

## 2021-05-08 RX ADMIN — INSULIN LISPRO 1 UNITS: 100 INJECTION, SOLUTION INTRAVENOUS; SUBCUTANEOUS at 20:55

## 2021-05-08 RX ADMIN — PREGABALIN 150 MG: 150 CAPSULE ORAL at 08:28

## 2021-05-08 RX ADMIN — HYDROMORPHONE HYDROCHLORIDE 1 MG: 1 INJECTION, SOLUTION INTRAMUSCULAR; INTRAVENOUS; SUBCUTANEOUS at 11:33

## 2021-05-08 RX ADMIN — OXYCODONE HYDROCHLORIDE 10 MG: 10 TABLET ORAL at 10:04

## 2021-05-08 RX ADMIN — OXYCODONE HYDROCHLORIDE 20 MG: 20 TABLET, FILM COATED, EXTENDED RELEASE ORAL at 20:44

## 2021-05-08 RX ADMIN — Medication 10 ML: at 08:29

## 2021-05-08 RX ADMIN — OXYCODONE HYDROCHLORIDE 20 MG: 20 TABLET, FILM COATED, EXTENDED RELEASE ORAL at 08:39

## 2021-05-08 RX ADMIN — ACETAMINOPHEN 1000 MG: 500 TABLET, FILM COATED ORAL at 08:27

## 2021-05-08 RX ADMIN — HYDROMORPHONE HYDROCHLORIDE 1 MG: 1 INJECTION, SOLUTION INTRAMUSCULAR; INTRAVENOUS; SUBCUTANEOUS at 20:03

## 2021-05-08 RX ADMIN — HYDROMORPHONE HYDROCHLORIDE 1 MG: 1 INJECTION, SOLUTION INTRAMUSCULAR; INTRAVENOUS; SUBCUTANEOUS at 07:03

## 2021-05-08 RX ADMIN — OXYCODONE HYDROCHLORIDE 10 MG: 10 TABLET ORAL at 18:29

## 2021-05-08 RX ADMIN — METFORMIN HYDROCHLORIDE 500 MG: 500 TABLET ORAL at 17:16

## 2021-05-08 RX ADMIN — LISINOPRIL 20 MG: 20 TABLET ORAL at 08:28

## 2021-05-08 RX ADMIN — OXYCODONE HYDROCHLORIDE 10 MG: 10 TABLET ORAL at 00:13

## 2021-05-08 RX ADMIN — SENNOSIDES AND DOCUSATE SODIUM 1 TABLET: 8.6; 5 TABLET ORAL at 20:44

## 2021-05-08 RX ADMIN — OXYCODONE HYDROCHLORIDE 10 MG: 10 TABLET ORAL at 14:18

## 2021-05-08 ASSESSMENT — PAIN DESCRIPTION - LOCATION
LOCATION: BACK

## 2021-05-08 ASSESSMENT — PAIN SCALES - GENERAL
PAINLEVEL_OUTOF10: 4
PAINLEVEL_OUTOF10: 5
PAINLEVEL_OUTOF10: 10
PAINLEVEL_OUTOF10: 8
PAINLEVEL_OUTOF10: 4
PAINLEVEL_OUTOF10: 10
PAINLEVEL_OUTOF10: 3
PAINLEVEL_OUTOF10: 0
PAINLEVEL_OUTOF10: 3
PAINLEVEL_OUTOF10: 4
PAINLEVEL_OUTOF10: 4
PAINLEVEL_OUTOF10: 5
PAINLEVEL_OUTOF10: 8
PAINLEVEL_OUTOF10: 9
PAINLEVEL_OUTOF10: 9
PAINLEVEL_OUTOF10: 10
PAINLEVEL_OUTOF10: 0
PAINLEVEL_OUTOF10: 9
PAINLEVEL_OUTOF10: 9
PAINLEVEL_OUTOF10: 8
PAINLEVEL_OUTOF10: 4
PAINLEVEL_OUTOF10: 4
PAINLEVEL_OUTOF10: 8
PAINLEVEL_OUTOF10: 9

## 2021-05-08 ASSESSMENT — PAIN DESCRIPTION - PAIN TYPE
TYPE: SURGICAL PAIN
TYPE: SUPERFICIAL SOMATIC PAIN
TYPE: SURGICAL PAIN

## 2021-05-08 ASSESSMENT — PAIN DESCRIPTION - FREQUENCY
FREQUENCY: CONTINUOUS

## 2021-05-08 ASSESSMENT — PAIN DESCRIPTION - ORIENTATION
ORIENTATION: LEFT

## 2021-05-08 ASSESSMENT — PAIN DESCRIPTION - ONSET
ONSET: ON-GOING

## 2021-05-08 ASSESSMENT — PAIN DESCRIPTION - DESCRIPTORS
DESCRIPTORS: ACHING;CONSTANT;DISCOMFORT
DESCRIPTORS: ACHING;DISCOMFORT;SORE
DESCRIPTORS: ACHING;CONSTANT;CRUSHING
DESCRIPTORS: ACHING;CONSTANT;DISCOMFORT
DESCRIPTORS: ACHING;CONSTANT;DISCOMFORT
DESCRIPTORS: ACHING;DISCOMFORT;SORE
DESCRIPTORS: ACHING;DISCOMFORT;SORE
DESCRIPTORS: ACHING;CONSTANT

## 2021-05-08 NOTE — PROGRESS NOTES
Subjective:  Feeling better no complaints  No CP or SOB  No fever or chills   No uncontrolled pain  No vomiting or diarrhea     Objective:    /76   Pulse 96   Temp 98.1 °F (36.7 °C)   Resp 16   Ht 6' (1.829 m)   Wt 283 lb (128.4 kg)   SpO2 96%   BMI 38.38 kg/m²     24HR INTAKE/OUTPUT:      Intake/Output Summary (Last 24 hours) at 5/8/2021 0754  Last data filed at 5/8/2021 0650  Gross per 24 hour   Intake 680 ml   Output 750 ml   Net -70 ml       General appearance: NAD, conversant  Neck: FROM, supple   Lungs: Clear bilaterally no wheezes, no rhonchi, no crackles  CV: RRR, no MRGs; normal carotid upstroke and amplitude without Bruits  Abdomen: Soft, non-tender; no masses or HSM  Extremities: No edema, no cyanosis, no clubbing  Skin: Intact no rash, no lesions, no ulcers    Psych: Alert and oriented normal affect  Neuro: Nonfocal  Most Recent Labs  Lab Results   Component Value Date    WBC 9.8 05/08/2021    HGB 14.0 05/08/2021    HCT 44.1 05/08/2021     05/08/2021     05/08/2021    K 4.4 05/08/2021    CL 95 (L) 05/08/2021    CREATININE 0.6 (L) 05/08/2021    BUN 11 05/08/2021    CO2 32 (H) 05/08/2021    GLUCOSE 150 (H) 05/08/2021    ALT 10 05/08/2021    AST 10 05/08/2021    INR 1.1 04/30/2021    LABA1C 8.7 (H) 04/26/2021     No results for input(s): MG in the last 72 hours.   Lab Results   Component Value Date    CALCIUM 9.5 05/08/2021        NM BONE SCAN WHOLE BODY   Final Result   Findings compatible with degenerative changes    Increased tracer uptake within T4 T8 and T12 as described   Increased tracer uptake at the manubrium of the sternum on the left at   its junction with the clavicle possibly traumatic, please correlate   clinically      US DUP LOWER EXTREMITIES BILATERAL VENOUS   Final Result   Within the visualized vessels there is no evidence for deep venous   thrombosis               CT CHEST W CONTRAST   Final Result   Compression fractures of T8 and T12 with vertebroplasty at T12 and surgical   changes at the thoracolumbar junction. Nonspecific right adrenal mass and upper it did in the nail and a abdomen   lymphadenopathy. Atelectasis in the lung bases. Consider further assessment by PET-CT scan. CT abdomen and pelvis. The liver is decreased in attenuation likely liver disease or fatty   infiltration. The gallbladder is distended. Consider ultrasonography. Spleen, pancreas, and the left adrenal gland appear normal.  A previously   noted right adrenal mass measuring 2.3 x 3.4 cm is identified. Left kidney   is normal except for a 1.2 cm cystic lesion in the left kidney. There is a   large complex cystic and solid mass measuring 5.2 x 4.7 x 6.4 cm involving   the inferior pole of the right kidney. Additional complex hypodense lesions   are identified in the right kidney, the largest 1 measuring 2.8 x 2.6 cm. Multifocal malignancy is considered. Pyelonephritis with abscess is less   likely. Previously noted compression fracture of T12 is noted with   postoperative changes in the thoracolumbar junction. Pelvis. Bladder is distended. Prostate gland is prominent. Fat containing   right inguinal hernia is noted. There is constipation. The appendix is   normal.      Impression      Heterogeneous appearance of the right kidney with complex mass in the i   inferior pole concerning for necrotic malignancy. Additional hypodense   lesions are also identified. Differential consideration will also include   multifocal pyelonephritis with abscess and or malignancy. Postsurgical changes in the thoracolumbar junction with previous   vertebroplasty 8 T12. Further assessment by PET-CT scan may be considered. CT ABDOMEN PELVIS W IV CONTRAST Additional Contrast? Radiologist Recommendation   Final Result   Compression fractures of T8 and T12 with vertebroplasty at T12 and surgical   changes at the thoracolumbar junction.       Nonspecific right adrenal mass and upper it did in the nail and a abdomen   lymphadenopathy. Atelectasis in the lung bases. Consider further assessment by PET-CT scan. CT abdomen and pelvis. The liver is decreased in attenuation likely liver disease or fatty   infiltration. The gallbladder is distended. Consider ultrasonography. Spleen, pancreas, and the left adrenal gland appear normal.  A previously   noted right adrenal mass measuring 2.3 x 3.4 cm is identified. Left kidney   is normal except for a 1.2 cm cystic lesion in the left kidney. There is a   large complex cystic and solid mass measuring 5.2 x 4.7 x 6.4 cm involving   the inferior pole of the right kidney. Additional complex hypodense lesions   are identified in the right kidney, the largest 1 measuring 2.8 x 2.6 cm. Multifocal malignancy is considered. Pyelonephritis with abscess is less   likely. Previously noted compression fracture of T12 is noted with   postoperative changes in the thoracolumbar junction. Pelvis. Bladder is distended. Prostate gland is prominent. Fat containing   right inguinal hernia is noted. There is constipation. The appendix is   normal.      Impression      Heterogeneous appearance of the right kidney with complex mass in the i   inferior pole concerning for necrotic malignancy. Additional hypodense   lesions are also identified. Differential consideration will also include   multifocal pyelonephritis with abscess and or malignancy. Postsurgical changes in the thoracolumbar junction with previous   vertebroplasty 8 T12. Further assessment by PET-CT scan may be considered. FLUORO FOR SURGICAL PROCEDURES   Final Result   Intraprocedural fluoroscopic spot images as above. See separate procedure   report for more information. XR CHEST PORTABLE   Final Result   No acute process.          MRI LUMBAR SPINE WO CONTRAST   Final Result   Acute to subacute superior endplate compression fracture of T12 with 40%   height loss and 5 mm retropulsion into the spinal canal.  There is associated   extensive anterior epidural hematoma at this level which contributes to   severe canal stenosis with cord compression at the level of T12 and moderate   bilateral neural foraminal narrowing at T11-T12 and T12-L1. No evidence of spondylo discitis. Chronic superior endplate compression fracture of T8 with 25% height loss. The findings were sent to the Radiology Results [id number] at 7:56   pm on 4/28/2021to be communicated to a licensed caregiver. MRI THORACIC SPINE WO CONTRAST   Final Result   Acute to subacute superior endplate compression fracture of T12 with 40%   height loss and 5 mm retropulsion into the spinal canal.  There is associated   extensive anterior epidural hematoma at this level which contributes to   severe canal stenosis with cord compression at the level of T12 and moderate   bilateral neural foraminal narrowing at T11-T12 and T12-L1. No evidence of spondylo discitis. Chronic superior endplate compression fracture of T8 with 25% height loss. The findings were sent to the Radiology Results [id number] at 7:56   pm on 4/28/2021to be communicated to a licensed caregiver. CT THORACIC SPINE W CONTRAST   Final Result   1. Subacute T12 compression fracture demonstrating 30% loss of height. 2. Mild spinal canal stenosis at T11-T12 secondary to 4 mm of retropulsion of   the posterior margin of the T12 vertebral body into the spinal canal.   3. No findings to suggest discitis/osteomyelitis. 4. Indeterminate 2.2 cm right adrenal nodule for which an MRI with contrast   is recommended for further characterization.              Assessment    Principal Problem:    Sarcomatoid renal cell carcinoma (HCC)  Active Problems:    Hyponatremia    Diabetes mellitus (HCC)    Tobacco abuse    Adrenal mass (HCC)    Burst fracture of twelfth thoracic vertebra [facility])    Spinal cord compression ([facility] [address]    Acute thoracic back pain    Pathologic fracture    Spinal cord tumor  Resolved Problems:    * No resolved hospital problems. *      Plan:48year-old gentleman with pathologic T12 compression fracture s/p T12 Laminectomy and a large right renal mass; possible adrenal gland lesion pathology confirming high grade sarcomatoid renal cell carcinoma. S/p T12 kyphoplasty and T12 laminectomy for tumor decompression on 4/30    Path is back showing right sided high grade sarcomatoid renal call carcinoma. R adrenal mass of unclear significance      Per heme-onc, this disease is aggressive and carries a poor prognosis. He needs timely consideration of debulking nephrectomy and follow-up radiation/chemo at a quaternary referral center that has some experience dealing with this.     Diabetes-Metformin, MBS/SSI    Hypertension-lisinopril-monitor    Hyponatremia improved continue to monitor    Tobacco cessation counseled      Pain management consult appreciated Lyrica, duloxetine, OxyContin, OxyIR, Dilaudid IV, acetaminophen, Valium continue current regimen     Disposition-awaiting bed at [facility]   Considerable time spent reviewing hospital course and diagnosis and treatment of all the above listed medical problems.     electronically signed by Ruthie Sanchez MD on 5/8/2021 at 7:54 AM

## 2021-05-08 NOTE — PLAN OF CARE
Problem: Pain:  Goal: Pain level will decrease  5/8/2021 1603 by Graciela Stratton RN  Outcome: Met This Shift  5/8/2021 1025 by Graciela Stratton RN  Outcome: Met This Shift     Problem: Pain:  Goal: Pain level will decrease  5/8/2021 1603 by Graciela Stratton RN  Outcome: Met This Shift  5/8/2021 1025 by Graciela Stratton RN  Outcome: Met This Shift  Goal: Control of acute pain  5/8/2021 1603 by Graciela Stratton RN  Outcome: Met This Shift  5/8/2021 1025 by Graciela Stratton RN  Outcome: Met This Shift  Goal: Control of chronic pain  5/8/2021 1603 by Graciela Stratton RN  Outcome: Met This Shift  5/8/2021 1025 by Graciela Stratton RN  Outcome: Met This Shift

## 2021-05-08 NOTE — PROGRESS NOTES
Physical Therapy  Facility/Department: [facility]  Daily Treatment Note  NAME: Macy Jamison  : 1965  MRN: [id number]    Date of Service: 2021     Treatment session attempted 21 PM. Pt supine in bed upon room entry, adamantly refusing participation due to generalized pain. Pt continued to refuse despite therapist encouragement. Pt's wishes respected, will attempt again at a later time.      Jorge Salas, PT, DPT  [id number]

## 2021-05-08 NOTE — PROGRESS NOTES
[facility]  PROGRESS NOTE    Chief Complaint:   Right renal mass with spinal mets likely renal cell carcinoma with sarcomatoid features    HPI:   He is still having trouble walking. He denies any pain. He is voiding comfortably. He denies gross hematuria or dysuria. Vitals:    05/08/21 1600   BP: (!) 138/98   Pulse: 97   Resp: 16   Temp: 98.6 °F (37 °C)   SpO2: 98%       Allergies: Patient has no known allergies. PAST MEDICAL HISTORY:   Past Medical History:   Diagnosis Date    Arthritis     right wrist    Diabetes mellitus ([address]     borderline, tries to watch diet    Difficult intubation     Hypertension     bp has been sporadic    Infected tooth 5/21/2014    saw dentist, placed on antibiotic, to call Dr. Annalee Sharma office to inform    Pain     chronic, goes to pain clinic, placed on Methodone    Preoperative clearance 5/28/2014    dental- Dr. Fito Lopez; paper copy on chart       PAST SURGICAL HISTORY:   Past Surgical History:   Procedure Laterality Date    CARPAL TUNNEL RELEASE Right 2012    LAMINECTOMY N/A 4/30/2021    T12 LAMINECTOMY WITH T12 KYPHO performed by Yandel Bhatti MD at [address] [place] [address],[facility]    cervical 2-6, fusion    WRIST ARTHROSCOPY  5/30/14    right with decompression        PAST FAMILY HISTORY:  History reviewed. No pertinent family history.     PAST SOCIAL HISTORY:    Social History     Socioeconomic History    Marital status:      Spouse name: None    Number of children: None    Years of education: None    Highest education level: None   Occupational History    None   Social Needs    Financial resource strain: None    Food insecurity     Worry: None     Inability: None    Transportation needs     Medical: None     Non-medical: None   Tobacco Use    Smoking status: Current Every Day Smoker     Packs/day: 0.25     Years: 22.00     Pack years: 5.50    Smokeless tobacco: Never Used   Substance and Sexual Activity    Alcohol use: No    Drug use: No    Sexual activity: None   Lifestyle    Physical activity     Days per week: None     Minutes per session: None    Stress: None   Relationships    Social connections     Talks on phone: None     Gets together: None     Attends Ca[employer] service: None     Active member of club or organization: None     Attends meetings of clubs or organizations: None     Relationship status: None    Intimate partner violence     Fear of current or ex partner: None     Emotionally abused: None     Physically abused: None     Forced sexual activity: None   Other Topics Concern    None   Social History Narrative    None   Originally from [place], [place]. He is . He has no biological children. He has several stepchildren.   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    IV:    sodium chloride      sodium chloride      dextrose         PRN: [DISCONTINUED] oxyCODONE **OR** oxyCODONE, bisacodyl, HYDROmorphone **OR** [DISCONTINUED] HYDROmorphone, diazePAM, sodium chloride flush, sodium chloride, promethazine **OR** ondansetron, sodium chloride flush, sodium chloride, potassium chloride **OR** potassium alternative oral replacement **OR** potassium chloride, magnesium hydroxide, glucose, dextrose, glucagon (rDNA), dextrose, trimethobenzamide    Scheduled:    pregabalin  150 mg Oral BID    DULoxetine  30 mg Oral Daily    oxyCODONE  20 mg Oral 2 times per day    metFORMIN  500 mg Oral BID WC    enoxaparin  40 mg Subcutaneous Daily    sodium chloride flush  5-40 mL Intravenous 2 times per day    sennosides-docusate sodium  1 tablet Oral BID    polyethylene glycol  17 g Oral BID    nicotine  1 patch Transdermal Daily    acetaminophen  1,000 mg Oral TID    lamoTRIgine  100 mg Oral BID    sodium chloride flush  10 mL Intravenous 2 times per day    insulin lispro  0-6 Units Subcutaneous TID WC    insulin lispro  0-3 Units Subcutaneous Nightly    lisinopril  20 mg Oral Daily       Lab Results   Component Value Date     05/08/2021    K 4.4 05/08/2021    K 4.4 04/29/2021    BUN 11 05/08/2021    CREATININE 0.6 05/08/2021        Lab Results   Component Value Date    HGB 14.0 05/08/2021    HCT 44.1 05/08/2021       Lab Results   Component Value Date    PSA 1.44 04/29/2021       Review Of Systems:  Constitutional: Tired  Eyes: negative  Ears, nose, mouth, throat, and face: negative  Respiratory: negative  Cardiovascular: Hypertension  Gastrointestinal: negative  Genitourinary: Right kidney tumor  Musculoskeletal: negative  Neurological: negative  Behavioral/Psych: negative  Endocrine: Diabetes    Physical Exam:  Skin is dry, and without rashes  Respirations are non-labored, intact  Abdomen is soft, non-tender, non-distended. Active bowel sounds are present. No rebound or guarding. His abdomen is obese. Alert and oriented x 3. No focal motor/sensory deficits    Assessment and Plan:  Right renal mass with spinal mets likely renal cell carcinoma with sarcomatoid features  -He can be managed without a Figueroa  -He has been seen by medical oncology locally. Dr. Ino Salgado recommends systemic therapy with dual immunotherapy versus TKI and palliative spinal radiation  -No role for a radical nephrectomy at this point which we discussed  -Awaiting transfer to the [facility].   -We will follow him during his hospital stay    Nick Weber MD  5/8/2021  6:15 PM

## 2021-05-08 NOTE — PROGRESS NOTES
Subjective:  Patient is alert laying in the bed. He complains of back pain as well as burning sensation in bilateral lower extremities. States pain medications and Lyrica are likely helping modestly. Pain is adequately controlled. He is worried about financial issues and insurance coverage. Denies any overnight problems. No CP, S OB, palpitations. Denies any fever, chills, night sweats. Appetite is stable. Objective:    BP (!) 192/88   Pulse 96   Temp 98.4 °F (36.9 °C)   Resp 18   Ht 6' (1.829 m)   Wt 283 lb (128.4 kg)   SpO2 96%   BMI 38.38 kg/m²     General: NAD, obese. HEENT: No thrush or mucositis, EOMI, PERRLA  Heart:  RRR, no murmurs, gallops, or rubs.   Lungs:  CTA bilaterally, no wheeze, rales or rhonchi  Abd: bowel sounds present, nontender, nondistended, no masses  Extrem:  No clubbing, cyanosis, or edema  Lymphatics: No palpable adenopathy in cervical and supraclavicular regions  Skin: Intact, no petechia or purpura    CBC with Differential:    Lab Results   Component Value Date    WBC 9.8 05/08/2021    RBC 5.15 05/08/2021    HGB 14.0 05/08/2021    HCT 44.1 05/08/2021     05/08/2021    MCV 85.6 05/08/2021    MCH 27.2 05/08/2021    MCHC 31.7 05/08/2021    RDW 12.8 05/08/2021    LYMPHOPCT 31.2 05/08/2021    MONOPCT 10.0 05/08/2021    BASOPCT 0.8 05/08/2021    MONOSABS 0.98 05/08/2021    LYMPHSABS 3.06 05/08/2021    EOSABS 0.47 05/08/2021    BASOSABS 0.08 05/08/2021     CMP:    Lab Results   Component Value Date     05/08/2021    K 4.4 05/08/2021    K 4.4 04/29/2021    CL 95 05/08/2021    CO2 32 05/08/2021    BUN 11 05/08/2021    CREATININE 0.6 05/08/2021    GFRAA >60 05/08/2021    LABGLOM >60 05/08/2021    GLUCOSE 150 05/08/2021    PROT 6.8 05/08/2021    LABALBU 3.2 05/08/2021    CALCIUM 9.5 05/08/2021    BILITOT 0.2 05/08/2021    ALKPHOS 83 05/08/2021    AST 10 05/08/2021    ALT 10 05/08/2021         Current Facility-Administered Medications:     pregabalin (LYRICA) capsule 150 mg, 150 mg, Oral, BID, Chuy Moy MD, 150 mg at 05/08/21 3289    DULoxetine (CYMBALTA) extended release capsule 30 mg, 30 mg, Oral, Daily, Chuy Moy MD, 30 mg at 05/08/21 3838    oxyCODONE (OXYCONTIN) extended release tablet 20 mg, 20 mg, Oral, 2 times per day, Chuy Moy MD, 20 mg at 05/08/21 0839    [DISCONTINUED] oxyCODONE (ROXICODONE) immediate release tablet 5 mg, 5 mg, Oral, Q4H PRN **OR** oxyCODONE HCl (OXY-IR) immediate release tablet 10 mg, 10 mg, Oral, Q4H PRN, Chuy Moy MD, 10 mg at 05/08/21 1004    metFORMIN (GLUCOPHAGE) tablet 500 mg, 500 mg, Oral, BID WC, Chuy Moy MD, 500 mg at 05/08/21 0828    enoxaparin (LOVENOX) injection 40 mg, 40 mg, Subcutaneous, Daily, Ashlyn Hudson PA-C, 40 mg at 05/08/21 0827    bisacodyl (DULCOLAX) suppository 10 mg, 10 mg, Rectal, Daily PRN, BLESSING Castaneda    HYDROmorphone (DILAUDID) injection 1 mg, 1 mg, Intravenous, Q4H PRN, 1 mg at 05/08/21 1133 **OR** [DISCONTINUED] HYDROmorphone (DILAUDID) injection 0.5 mg, 0.5 mg, Intravenous, Q4H PRN, Chuy Moy MD    sodium chloride flush 0.9 % injection 5-40 mL, 5-40 mL, Intravenous, 2 times per day, Roger Leal, PA, 10 mL at 05/08/21 0830    diazePAM (VALIUM) tablet 5 mg, 5 mg, Oral, Q6H PRN, Roger Fajardoump, PA, 5 mg at 05/08/21 0539    sodium chloride flush 0.9 % injection 5-40 mL, 5-40 mL, Intravenous, PRN, Roger Leal, PA    0.9 % sodium chloride infusion, 25 mL, Intravenous, PRN, Roger Leal, PA    promethazine (PHENERGAN) tablet 12.5 mg, 12.5 mg, Oral, Q6H PRN **OR** ondansetron (ZOFRAN) injection 4 mg, 4 mg, Intravenous, Q6H PRN, Roger Leal, PA    sennosides-docusate sodium (SENOKOT-S) 8.6-50 MG tablet 1 tablet, 1 tablet, Oral, BID, BLESSING Castaneda, 1 tablet at 05/08/21 0827    polyethylene glycol (GLYCOLAX) packet 17 g, 17 g, Oral, BID, Roger Leal, PA, 17 g at 05/03/21 0939    nicotine (NICODERM CQ) 21 MG/24HR 1 patch, 1 patch, Transdermal, Daily, BLESSING Dela Cruz, 1 patch at 05/08/21 0830    acetaminophen (TYLENOL) tablet 1,000 mg, 1,000 mg, Oral, TID, BLESSING Dela Cruz, 1,000 mg at 05/08/21 1305    lamoTRIgine (LAMICTAL) tablet 100 mg, 100 mg, Oral, BID, BLESSING Dela Cruz, 100 mg at 05/08/21 6192    sodium chloride flush 0.9 % injection 10 mL, 10 mL, Intravenous, 2 times per day, BLESSING Dela Cruz, 10 mL at 05/08/21 0829    sodium chloride flush 0.9 % injection 10 mL, 10 mL, Intravenous, PRN, BLESSING Dela Cruz, 10 mL at 05/07/21 1731    0.9 % sodium chloride infusion, 25 mL, Intravenous, PRN, BLESSING Dela Cruz    potassium chloride (KLOR-CON M) extended release tablet 40 mEq, 40 mEq, Oral, PRN **OR** potassium bicarb-citric acid (EFFER-K) effervescent tablet 40 mEq, 40 mEq, Oral, PRN **OR** potassium chloride 10 mEq/100 mL IVPB (Peripheral Line), 10 mEq, Intravenous, PRN, BLESSING Dela Cruz    magnesium hydroxide (MILK OF MAGNESIA) 400 MG/5ML suspension 30 mL, 30 mL, Oral, Daily PRN, BLESSING Dela Cruz    glucose (GLUTOSE) 40 % oral gel 15 g, 15 g, Oral, PRN, BLESSING Dela Cruz    dextrose 50 % IV solution, 12.5 g, Intravenous, PRN, BLESSING Dela Cruz    glucagon (rDNA) injection 1 mg, 1 mg, Intramuscular, PRN, BLESSING Dela Cruz    dextrose 5 % solution, 100 mL/hr, Intravenous, PRN, BLESSING Dela Cruz    insulin lispro (HUMALOG) injection vial 0-6 Units, 0-6 Units, Subcutaneous, TID WC, BLESSING Dela Cruz, 1 Units at 05/08/21 1305    insulin lispro (HUMALOG) injection vial 0-3 Units, 0-3 Units, Subcutaneous, Nightly, BLESSING Dela Cruz, 1 Units at 05/07/21 2029    trimethobenzamide (TIGAN) injection 200 mg, 200 mg, Intramuscular, Q6H PRN, BLESSING Dela Cruz    lisinopril (PRINIVIL;ZESTRIL) tablet 20 mg, 20 mg, Oral, Daily, BLESSING Dela Cruz, 20 mg at 05/08/21 5906    Assessment:    Principal Problem:    Sarcomatoid renal cell carcinoma (HCC)  Active Problems:    Hyponatremia Diabetes mellitus ([facility] [address]    Tobacco abuse    Adrenal mass (HCC)    Burst fracture of twelfth thoracic vertebra (HCC)    Spinal cord compression (HCC)    Acute thoracic back pain    Pathologic fracture    Spinal cord tumor  Resolved Problems:    * No resolved hospital problems. *    81-year-old gentleman with large R renal mass, epidural mass, possible adrenal gland lesion and a pathologic T12 compression fracture s/p excision of mass and T12 debulking and biopsy on 4/30/21, pathology confirmed poorly differentiated carcinoma with spindle cell features suggestive for sarcomatoid renal cell carcinoma. Bone scan revealed uptake within T4, T8, T12.     Plan:  Unfortunately has metastatic RCC to thoraic levels and likely adrenal.    He will require systemic therapy, dual immunotherapy versus TKI therapy. Palliative radiation to the T12 lesion. He is interested in evaluation in [place] and is awaiting transfer. Lovenox for prophylaxis. Pain is controlled.     Electronically signed by Meenakshi Vera MD on 5/8/2021 at 1:38 PM

## 2021-05-09 LAB
METER GLUCOSE: 142 MG/DL (ref 74–99)
METER GLUCOSE: 172 MG/DL (ref 74–99)
METER GLUCOSE: 199 MG/DL (ref 74–99)
METER GLUCOSE: 222 MG/DL (ref 74–99)

## 2021-05-09 PROCEDURE — 6370000000 HC RX 637 (ALT 250 FOR IP): Performed by: INTERNAL MEDICINE

## 2021-05-09 PROCEDURE — 6360000002 HC RX W HCPCS: Performed by: INTERNAL MEDICINE

## 2021-05-09 PROCEDURE — 1200000000 HC SEMI PRIVATE

## 2021-05-09 PROCEDURE — 6370000000 HC RX 637 (ALT 250 FOR IP): Performed by: PHYSICIAN ASSISTANT

## 2021-05-09 PROCEDURE — 82962 GLUCOSE BLOOD TEST: CPT

## 2021-05-09 PROCEDURE — 2580000003 HC RX 258: Performed by: PHYSICIAN ASSISTANT

## 2021-05-09 PROCEDURE — 6360000002 HC RX W HCPCS: Performed by: PHYSICIAN ASSISTANT

## 2021-05-09 RX ADMIN — INSULIN LISPRO 1 UNITS: 100 INJECTION, SOLUTION INTRAVENOUS; SUBCUTANEOUS at 08:41

## 2021-05-09 RX ADMIN — INSULIN LISPRO 2 UNITS: 100 INJECTION, SOLUTION INTRAVENOUS; SUBCUTANEOUS at 12:53

## 2021-05-09 RX ADMIN — SENNOSIDES AND DOCUSATE SODIUM 1 TABLET: 8.6; 5 TABLET ORAL at 08:39

## 2021-05-09 RX ADMIN — INSULIN LISPRO 1 UNITS: 100 INJECTION, SOLUTION INTRAVENOUS; SUBCUTANEOUS at 20:30

## 2021-05-09 RX ADMIN — OXYCODONE HYDROCHLORIDE 20 MG: 20 TABLET, FILM COATED, EXTENDED RELEASE ORAL at 20:25

## 2021-05-09 RX ADMIN — HYDROMORPHONE HYDROCHLORIDE 1 MG: 1 INJECTION, SOLUTION INTRAMUSCULAR; INTRAVENOUS; SUBCUTANEOUS at 21:01

## 2021-05-09 RX ADMIN — HYDROMORPHONE HYDROCHLORIDE 1 MG: 1 INJECTION, SOLUTION INTRAMUSCULAR; INTRAVENOUS; SUBCUTANEOUS at 08:36

## 2021-05-09 RX ADMIN — INSULIN LISPRO 1 UNITS: 100 INJECTION, SOLUTION INTRAVENOUS; SUBCUTANEOUS at 17:41

## 2021-05-09 RX ADMIN — OXYCODONE HYDROCHLORIDE 10 MG: 10 TABLET ORAL at 14:14

## 2021-05-09 RX ADMIN — ACETAMINOPHEN 1000 MG: 500 TABLET, FILM COATED ORAL at 13:00

## 2021-05-09 RX ADMIN — PREGABALIN 150 MG: 150 CAPSULE ORAL at 08:39

## 2021-05-09 RX ADMIN — MAGNESIUM HYDROXIDE 30 ML: 2400 SUSPENSION ORAL at 08:44

## 2021-05-09 RX ADMIN — OXYCODONE HYDROCHLORIDE 20 MG: 20 TABLET, FILM COATED, EXTENDED RELEASE ORAL at 08:38

## 2021-05-09 RX ADMIN — POLYETHYLENE GLYCOL 3350 17 G: 17 POWDER, FOR SOLUTION ORAL at 20:23

## 2021-05-09 RX ADMIN — LAMOTRIGINE 100 MG: 100 TABLET ORAL at 08:39

## 2021-05-09 RX ADMIN — PREGABALIN 150 MG: 150 CAPSULE ORAL at 20:24

## 2021-05-09 RX ADMIN — METFORMIN HYDROCHLORIDE 500 MG: 500 TABLET ORAL at 08:40

## 2021-05-09 RX ADMIN — ENOXAPARIN SODIUM 40 MG: 40 INJECTION SUBCUTANEOUS at 08:39

## 2021-05-09 RX ADMIN — SENNOSIDES AND DOCUSATE SODIUM 1 TABLET: 8.6; 5 TABLET ORAL at 20:24

## 2021-05-09 RX ADMIN — LAMOTRIGINE 100 MG: 100 TABLET ORAL at 20:24

## 2021-05-09 RX ADMIN — Medication 10 ML: at 20:26

## 2021-05-09 RX ADMIN — OXYCODONE HYDROCHLORIDE 10 MG: 10 TABLET ORAL at 09:58

## 2021-05-09 RX ADMIN — ACETAMINOPHEN 1000 MG: 500 TABLET, FILM COATED ORAL at 20:24

## 2021-05-09 RX ADMIN — HYDROMORPHONE HYDROCHLORIDE 1 MG: 1 INJECTION, SOLUTION INTRAMUSCULAR; INTRAVENOUS; SUBCUTANEOUS at 12:49

## 2021-05-09 RX ADMIN — HYDROMORPHONE HYDROCHLORIDE 1 MG: 1 INJECTION, SOLUTION INTRAMUSCULAR; INTRAVENOUS; SUBCUTANEOUS at 17:01

## 2021-05-09 RX ADMIN — HYDROMORPHONE HYDROCHLORIDE 1 MG: 1 INJECTION, SOLUTION INTRAMUSCULAR; INTRAVENOUS; SUBCUTANEOUS at 04:33

## 2021-05-09 RX ADMIN — OXYCODONE HYDROCHLORIDE 10 MG: 10 TABLET ORAL at 18:35

## 2021-05-09 RX ADMIN — OXYCODONE HYDROCHLORIDE 10 MG: 10 TABLET ORAL at 22:31

## 2021-05-09 RX ADMIN — METFORMIN HYDROCHLORIDE 500 MG: 500 TABLET ORAL at 17:41

## 2021-05-09 RX ADMIN — DULOXETINE HYDROCHLORIDE 30 MG: 30 CAPSULE, DELAYED RELEASE ORAL at 08:39

## 2021-05-09 RX ADMIN — ACETAMINOPHEN 1000 MG: 500 TABLET, FILM COATED ORAL at 08:39

## 2021-05-09 RX ADMIN — LISINOPRIL 20 MG: 20 TABLET ORAL at 08:39

## 2021-05-09 RX ADMIN — Medication 10 ML: at 08:36

## 2021-05-09 RX ADMIN — OXYCODONE HYDROCHLORIDE 10 MG: 10 TABLET ORAL at 05:46

## 2021-05-09 ASSESSMENT — PAIN DESCRIPTION - LOCATION
LOCATION: BACK

## 2021-05-09 ASSESSMENT — PAIN DESCRIPTION - PAIN TYPE
TYPE: SURGICAL PAIN
TYPE: ACUTE PAIN;SURGICAL PAIN
TYPE: SURGICAL PAIN

## 2021-05-09 ASSESSMENT — PAIN SCALES - GENERAL
PAINLEVEL_OUTOF10: 9
PAINLEVEL_OUTOF10: 9
PAINLEVEL_OUTOF10: 8
PAINLEVEL_OUTOF10: 4
PAINLEVEL_OUTOF10: 9
PAINLEVEL_OUTOF10: 0
PAINLEVEL_OUTOF10: 7
PAINLEVEL_OUTOF10: 3
PAINLEVEL_OUTOF10: 8
PAINLEVEL_OUTOF10: 9
PAINLEVEL_OUTOF10: 9
PAINLEVEL_OUTOF10: 4
PAINLEVEL_OUTOF10: 8
PAINLEVEL_OUTOF10: 2
PAINLEVEL_OUTOF10: 0
PAINLEVEL_OUTOF10: 9
PAINLEVEL_OUTOF10: 9
PAINLEVEL_OUTOF10: 10
PAINLEVEL_OUTOF10: 0
PAINLEVEL_OUTOF10: 4

## 2021-05-09 ASSESSMENT — PAIN DESCRIPTION - FREQUENCY
FREQUENCY: CONTINUOUS
FREQUENCY: CONTINUOUS

## 2021-05-09 ASSESSMENT — PAIN DESCRIPTION - ONSET
ONSET: ON-GOING

## 2021-05-09 ASSESSMENT — PAIN DESCRIPTION - ORIENTATION
ORIENTATION: LEFT

## 2021-05-09 ASSESSMENT — PAIN DESCRIPTION - DESCRIPTORS
DESCRIPTORS: ACHING;BURNING;DISCOMFORT
DESCRIPTORS: ACHING;BURNING;DISCOMFORT

## 2021-05-09 NOTE — FLOWSHEET NOTE
Pt allowed this writer to turn and check skin. Told patient he needs to turn and repostion. Pt stated he knew, but it hurts to much other then laying flat. Offered and encourage turn, but patient refused at this time.

## 2021-05-09 NOTE — PROGRESS NOTES
Subjective:  Feeling better no complaints  No CP or SOB  No fever or chills   No uncontrolled pain  No vomiting or diarrhea   Family at bedside all questions answered     Objective:    BP (!) 163/80   Pulse 97   Temp 99.1 °F (37.3 °C) (Temporal)   Resp 18   Ht 6' (1.829 m)   Wt 283 lb (128.4 kg)   SpO2 93%   BMI 38.38 kg/m²     24HR INTAKE/OUTPUT:      Intake/Output Summary (Last 24 hours) at 5/9/2021 0714  Last data filed at 5/9/2021 0424  Gross per 24 hour   Intake --   Output 1550 ml   Net -1550 ml       General appearance: NAD, conversant  Neck: FROM, supple   Lungs: Clear bilaterally no wheezes, no rhonchi, no crackles  CV: RRR, no MRGs; normal carotid upstroke and amplitude without Bruits  Abdomen: Soft, non-tender; no masses or HSM  Extremities: No edema, no cyanosis, no clubbing  Skin: Intact no rash, no lesions, no ulcers    Psych: Alert and oriented normal affect  Neuro: Nonfocal  Most Recent Labs  Lab Results   Component Value Date    WBC 9.8 05/08/2021    HGB 14.0 05/08/2021    HCT 44.1 05/08/2021     05/08/2021     05/08/2021    K 4.4 05/08/2021    CL 95 (L) 05/08/2021    CREATININE 0.6 (L) 05/08/2021    BUN 11 05/08/2021    CO2 32 (H) 05/08/2021    GLUCOSE 150 (H) 05/08/2021    ALT 10 05/08/2021    AST 10 05/08/2021    INR 1.1 04/30/2021    LABA1C 8.7 (H) 04/26/2021     No results for input(s): MG in the last 72 hours.   Lab Results   Component Value Date    CALCIUM 9.5 05/08/2021        NM BONE SCAN WHOLE BODY   Final Result   Findings compatible with degenerative changes    Increased tracer uptake within T4 T8 and T12 as described   Increased tracer uptake at the manubrium of the sternum on the left at   its junction with the clavicle possibly traumatic, please correlate   clinically      US DUP LOWER EXTREMITIES BILATERAL VENOUS   Final Result   Within the visualized vessels there is no evidence for deep venous   thrombosis               CT CHEST W CONTRAST   Final Result changes at the thoracolumbar junction. Nonspecific right adrenal mass and upper it did in the nail and a abdomen   lymphadenopathy. Atelectasis in the lung bases. Consider further assessment by PET-CT scan. CT abdomen and pelvis. The liver is decreased in attenuation likely liver disease or fatty   infiltration. The gallbladder is distended. Consider ultrasonography. Spleen, pancreas, and the left adrenal gland appear normal.  A previously   noted right adrenal mass measuring 2.3 x 3.4 cm is identified. Left kidney   is normal except for a 1.2 cm cystic lesion in the left kidney. There is a   large complex cystic and solid mass measuring 5.2 x 4.7 x 6.4 cm involving   the inferior pole of the right kidney. Additional complex hypodense lesions   are identified in the right kidney, the largest 1 measuring 2.8 x 2.6 cm. Multifocal malignancy is considered. Pyelonephritis with abscess is less   likely. Previously noted compression fracture of T12 is noted with   postoperative changes in the thoracolumbar junction. Pelvis. Bladder is distended. Prostate gland is prominent. Fat containing   right inguinal hernia is noted. There is constipation. The appendix is   normal.      Impression      Heterogeneous appearance of the right kidney with complex mass in the i   inferior pole concerning for necrotic malignancy. Additional hypodense   lesions are also identified. Differential consideration will also include   multifocal pyelonephritis with abscess and or malignancy. Postsurgical changes in the thoracolumbar junction with previous   vertebroplasty 8 T12. Further assessment by PET-CT scan may be considered. FLUORO FOR SURGICAL PROCEDURES   Final Result   Intraprocedural fluoroscopic spot images as above. See separate procedure   report for more information. XR CHEST PORTABLE   Final Result   No acute process.          MRI LUMBAR SPINE WO CONTRAST Final Result   Acute to subacute superior endplate compression fracture of T12 with 40%   height loss and 5 mm retropulsion into the spinal canal.  There is associated   extensive anterior epidural hematoma at this level which contributes to   severe canal stenosis with cord compression at the level of T12 and moderate   bilateral neural foraminal narrowing at T11-T12 and T12-L1. No evidence of spondylo discitis. Chronic superior endplate compression fracture of T8 with 25% height loss. The findings were sent to the Radiology Results [address] at 7:56   pm on 4/28/2021to be communicated to a licensed caregiver. MRI THORACIC SPINE WO CONTRAST   Final Result   Acute to subacute superior endplate compression fracture of T12 with 40%   height loss and 5 mm retropulsion into the spinal canal.  There is associated   extensive anterior epidural hematoma at this level which contributes to   severe canal stenosis with cord compression at the level of T12 and moderate   bilateral neural foraminal narrowing at T11-T12 and T12-L1. No evidence of spondylo discitis. Chronic superior endplate compression fracture of T8 with 25% height loss. The findings were sent to the Radiology Results [address] at 7:56   pm on 4/28/2021to be communicated to a licensed caregiver. CT THORACIC SPINE W CONTRAST   Final Result   1. Subacute T12 compression fracture demonstrating 30% loss of height. 2. Mild spinal canal stenosis at T11-T12 secondary to 4 mm of retropulsion of   the posterior margin of the T12 vertebral body into the spinal canal.   3. No findings to suggest discitis/osteomyelitis. 4. Indeterminate 2.2 cm right adrenal nodule for which an MRI with contrast   is recommended for further characterization.              Assessment    Principal Problem:    Sarcomatoid renal cell carcinoma (HCC)  Active Problems:    Hyponatremia    Diabetes mellitus (HCC)    Tobacco abuse    Adrenal mass (HCC)    Burst fracture of twelfth thoracic vertebra (HCC)    Spinal cord compression (HCC)    Acute thoracic back pain    Pathologic fracture    Spinal cord tumor  Resolved Problems:    * No resolved hospital problems. *      Plan:48year-old gentleman with pathologic T12 compression fracture s/p T12 Laminectomy and a large right renal mass; possible adrenal gland lesion pathology confirming high grade sarcomatoid renal cell carcinoma. S/p T12 kyphoplasty and T12 laminectomy for tumor decompression on 4/30    Path is back showing right sided high grade sarcomatoid renal call carcinoma. R adrenal mass of unclear significance      Per heme-onc, this disease is aggressive and carries a poor prognosis.   He needs timely consideration of debulking nephrectomy and follow-up radiation/chemo at a quaternary referral center that has some experience dealing with this.     Diabetes-Metformin, MBS/SSI    Hypertension-lisinopril-monitor    Hyponatremia improved continue to monitor    Tobacco cessation counseled      Pain management consult appreciated Lyrica, duloxetine, OxyContin, OxyIR, Dilaudid IV, acetaminophen, Valium continue current regimen     Disposition-awaiting bed at [facility]     electronically signed by Rosamaria Smyth MD on 5/9/2021 at 7:14 AM

## 2021-05-10 LAB
ANION GAP SERPL CALCULATED.3IONS-SCNC: 8 MMOL/L (ref 7–16)
BUN BLDV-MCNC: 11 MG/DL (ref 6–20)
CALCIUM SERPL-MCNC: 10 MG/DL (ref 8.6–10.2)
CHLORIDE BLD-SCNC: 93 MMOL/L (ref 98–107)
CO2: 31 MMOL/L (ref 22–29)
CREAT SERPL-MCNC: 0.6 MG/DL (ref 0.7–1.2)
GFR AFRICAN AMERICAN: >60
GFR NON-AFRICAN AMERICAN: >60 ML/MIN/1.73
GLUCOSE BLD-MCNC: 150 MG/DL (ref 74–99)
HCT VFR BLD CALC: 48.8 % (ref 37–54)
HEMOGLOBIN: 15.1 G/DL (ref 12.5–16.5)
MCH RBC QN AUTO: 27 PG (ref 26–35)
MCHC RBC AUTO-ENTMCNC: 30.9 % (ref 32–34.5)
MCV RBC AUTO: 87.1 FL (ref 80–99.9)
METER GLUCOSE: 135 MG/DL (ref 74–99)
METER GLUCOSE: 162 MG/DL (ref 74–99)
METER GLUCOSE: 197 MG/DL (ref 74–99)
METER GLUCOSE: 199 MG/DL (ref 74–99)
PDW BLD-RTO: 12.9 FL (ref 11.5–15)
PLATELET # BLD: 374 E9/L (ref 130–450)
PMV BLD AUTO: 10 FL (ref 7–12)
POTASSIUM SERPL-SCNC: 4.7 MMOL/L (ref 3.5–5)
RBC # BLD: 5.6 E12/L (ref 3.8–5.8)
SODIUM BLD-SCNC: 132 MMOL/L (ref 132–146)
WBC # BLD: 10.5 E9/L (ref 4.5–11.5)

## 2021-05-10 PROCEDURE — 97530 THERAPEUTIC ACTIVITIES: CPT

## 2021-05-10 PROCEDURE — 85027 COMPLETE CBC AUTOMATED: CPT

## 2021-05-10 PROCEDURE — 6370000000 HC RX 637 (ALT 250 FOR IP): Performed by: INTERNAL MEDICINE

## 2021-05-10 PROCEDURE — 1200000000 HC SEMI PRIVATE

## 2021-05-10 PROCEDURE — 36415 COLL VENOUS BLD VENIPUNCTURE: CPT

## 2021-05-10 PROCEDURE — 6370000000 HC RX 637 (ALT 250 FOR IP): Performed by: PHYSICIAN ASSISTANT

## 2021-05-10 PROCEDURE — 80048 BASIC METABOLIC PNL TOTAL CA: CPT

## 2021-05-10 PROCEDURE — 2580000003 HC RX 258: Performed by: PHYSICIAN ASSISTANT

## 2021-05-10 PROCEDURE — 6360000002 HC RX W HCPCS: Performed by: PHYSICIAN ASSISTANT

## 2021-05-10 PROCEDURE — 82962 GLUCOSE BLOOD TEST: CPT

## 2021-05-10 PROCEDURE — 6360000002 HC RX W HCPCS: Performed by: INTERNAL MEDICINE

## 2021-05-10 PROCEDURE — 97535 SELF CARE MNGMENT TRAINING: CPT

## 2021-05-10 PROCEDURE — 99999 PR OFFICE/OUTPT VISIT,PROCEDURE ONLY: CPT | Performed by: RADIOLOGY

## 2021-05-10 RX ORDER — OXYCODONE HCL 20 MG/1
40 TABLET, FILM COATED, EXTENDED RELEASE ORAL EVERY 12 HOURS SCHEDULED
Status: DISCONTINUED | OUTPATIENT
Start: 2021-05-10 | End: 2021-05-14 | Stop reason: HOSPADM

## 2021-05-10 RX ADMIN — Medication 10 ML: at 16:53

## 2021-05-10 RX ADMIN — PREGABALIN 150 MG: 150 CAPSULE ORAL at 08:35

## 2021-05-10 RX ADMIN — SENNOSIDES AND DOCUSATE SODIUM 1 TABLET: 8.6; 5 TABLET ORAL at 20:17

## 2021-05-10 RX ADMIN — Medication 10 ML: at 10:03

## 2021-05-10 RX ADMIN — OXYCODONE HYDROCHLORIDE 10 MG: 10 TABLET ORAL at 20:17

## 2021-05-10 RX ADMIN — Medication 10 ML: at 20:17

## 2021-05-10 RX ADMIN — LAMOTRIGINE 100 MG: 100 TABLET ORAL at 08:36

## 2021-05-10 RX ADMIN — INSULIN LISPRO 1 UNITS: 100 INJECTION, SOLUTION INTRAVENOUS; SUBCUTANEOUS at 12:13

## 2021-05-10 RX ADMIN — OXYCODONE HYDROCHLORIDE 10 MG: 10 TABLET ORAL at 13:44

## 2021-05-10 RX ADMIN — DIAZEPAM 5 MG: 5 TABLET ORAL at 12:10

## 2021-05-10 RX ADMIN — HYDROMORPHONE HYDROCHLORIDE 1 MG: 1 INJECTION, SOLUTION INTRAMUSCULAR; INTRAVENOUS; SUBCUTANEOUS at 03:15

## 2021-05-10 RX ADMIN — ACETAMINOPHEN 1000 MG: 500 TABLET, FILM COATED ORAL at 20:17

## 2021-05-10 RX ADMIN — HYDROMORPHONE HYDROCHLORIDE 1 MG: 1 INJECTION, SOLUTION INTRAMUSCULAR; INTRAVENOUS; SUBCUTANEOUS at 08:37

## 2021-05-10 RX ADMIN — SODIUM CHLORIDE, PRESERVATIVE FREE 10 ML: 5 INJECTION INTRAVENOUS at 20:22

## 2021-05-10 RX ADMIN — PREGABALIN 150 MG: 150 CAPSULE ORAL at 20:21

## 2021-05-10 RX ADMIN — DULOXETINE HYDROCHLORIDE 30 MG: 30 CAPSULE, DELAYED RELEASE ORAL at 08:35

## 2021-05-10 RX ADMIN — OXYCODONE HYDROCHLORIDE 40 MG: 20 TABLET, FILM COATED, EXTENDED RELEASE ORAL at 21:44

## 2021-05-10 RX ADMIN — LAMOTRIGINE 100 MG: 100 TABLET ORAL at 20:17

## 2021-05-10 RX ADMIN — ENOXAPARIN SODIUM 40 MG: 40 INJECTION SUBCUTANEOUS at 08:35

## 2021-05-10 RX ADMIN — Medication 10 ML: at 08:36

## 2021-05-10 RX ADMIN — HYDROMORPHONE HYDROCHLORIDE 1 MG: 1 INJECTION, SOLUTION INTRAMUSCULAR; INTRAVENOUS; SUBCUTANEOUS at 16:54

## 2021-05-10 RX ADMIN — MAGNESIUM HYDROXIDE 30 ML: 2400 SUSPENSION ORAL at 05:58

## 2021-05-10 RX ADMIN — ACETAMINOPHEN 1000 MG: 500 TABLET, FILM COATED ORAL at 13:44

## 2021-05-10 RX ADMIN — INSULIN LISPRO 1 UNITS: 100 INJECTION, SOLUTION INTRAVENOUS; SUBCUTANEOUS at 20:16

## 2021-05-10 RX ADMIN — LISINOPRIL 20 MG: 20 TABLET ORAL at 08:36

## 2021-05-10 RX ADMIN — METFORMIN HYDROCHLORIDE 500 MG: 500 TABLET ORAL at 08:36

## 2021-05-10 RX ADMIN — Medication 10 ML: at 12:49

## 2021-05-10 RX ADMIN — INSULIN LISPRO 1 UNITS: 100 INJECTION, SOLUTION INTRAVENOUS; SUBCUTANEOUS at 08:36

## 2021-05-10 RX ADMIN — ACETAMINOPHEN 1000 MG: 500 TABLET, FILM COATED ORAL at 08:36

## 2021-05-10 RX ADMIN — OXYCODONE HYDROCHLORIDE 20 MG: 20 TABLET, FILM COATED, EXTENDED RELEASE ORAL at 08:35

## 2021-05-10 RX ADMIN — HYDROMORPHONE HYDROCHLORIDE 1 MG: 1 INJECTION, SOLUTION INTRAMUSCULAR; INTRAVENOUS; SUBCUTANEOUS at 12:48

## 2021-05-10 RX ADMIN — METFORMIN HYDROCHLORIDE 500 MG: 500 TABLET ORAL at 16:52

## 2021-05-10 RX ADMIN — SENNOSIDES AND DOCUSATE SODIUM 1 TABLET: 8.6; 5 TABLET ORAL at 08:35

## 2021-05-10 RX ADMIN — OXYCODONE HYDROCHLORIDE 10 MG: 10 TABLET ORAL at 04:58

## 2021-05-10 ASSESSMENT — PAIN SCALES - GENERAL
PAINLEVEL_OUTOF10: 6
PAINLEVEL_OUTOF10: 10
PAINLEVEL_OUTOF10: 5
PAINLEVEL_OUTOF10: 7
PAINLEVEL_OUTOF10: 0
PAINLEVEL_OUTOF10: 10
PAINLEVEL_OUTOF10: 7
PAINLEVEL_OUTOF10: 10
PAINLEVEL_OUTOF10: 10
PAINLEVEL_OUTOF10: 7
PAINLEVEL_OUTOF10: 10
PAINLEVEL_OUTOF10: 9
PAINLEVEL_OUTOF10: 10

## 2021-05-10 ASSESSMENT — PAIN DESCRIPTION - LOCATION
LOCATION: BACK

## 2021-05-10 ASSESSMENT — PAIN DESCRIPTION - DIRECTION
RADIATING_TOWARDS: BLE

## 2021-05-10 ASSESSMENT — PAIN DESCRIPTION - ORIENTATION
ORIENTATION: LEFT

## 2021-05-10 ASSESSMENT — PAIN DESCRIPTION - FREQUENCY
FREQUENCY: CONTINUOUS

## 2021-05-10 ASSESSMENT — PAIN DESCRIPTION - PAIN TYPE
TYPE: SURGICAL PAIN

## 2021-05-10 ASSESSMENT — PAIN DESCRIPTION - DESCRIPTORS
DESCRIPTORS: ACHING;CONSTANT;DISCOMFORT

## 2021-05-10 ASSESSMENT — PAIN DESCRIPTION - ONSET
ONSET: ON-GOING

## 2021-05-10 ASSESSMENT — PAIN DESCRIPTION - PROGRESSION
CLINICAL_PROGRESSION: NOT CHANGED
CLINICAL_PROGRESSION: GRADUALLY IMPROVING
CLINICAL_PROGRESSION: NOT CHANGED
CLINICAL_PROGRESSION: GRADUALLY IMPROVING

## 2021-05-10 NOTE — PROGRESS NOTES
Comprehensive Nutrition Assessment    Type and Reason for Visit:  Reassess    Nutrition Recommendations/Plan: Continue current diet and ONS    Nutrition Assessment:  Pt admit w/ T12 burst fx s/p kypho/lami for tumor decompression. Noted sarcomatoid renal cell CA w/ mets, pending RT. Will continue current diet and ONS and monitor. Malnutrition Assessment:  Malnutrition Status:   At risk for malnutrition (Comment)    Context:  Acute Illness     Findings of the 6 clinical characteristics of malnutrition:  Energy Intake:  1 - 75% or less of estimated energy requirements for 7 or more days  Weight Loss:  Unable to assess     Body Fat Loss:  No significant body fat loss     Muscle Mass Loss:  No significant muscle mass loss    Fluid Accumulation:  No significant fluid accumulation     Strength:  Not Performed    Estimated Daily Nutrient Needs:  Energy (kcal):  ; Weight Used for Energy Requirements:  Admission     Protein (g):  120-145; Weight Used for Protein Requirements:  Ideal(1.5-1.8)        Fluid (ml/day):  1400; Method Used for Fluid Requirements:  1 ml/kcal      Nutrition Related Findings:  A&Ox3, active BS, soft abd, noted constipation, trace edema, +I/Os      Wounds:  Surgical Incision       Current Nutrition Therapies:    DIET CARB CONTROL; Carb Control: 4 carb choices (60 gms)/meal  Dietary Nutrition Supplements: Diabetic Oral Supplement    Anthropometric Measures:  · Height: 6' (182.9 cm)  · Current Body Weight: 265 lb (120.2 kg)(4/26 admit wt - CBW elevated 284# w/ +fluids)   · Admission Body Weight: 265 lb (120.2 kg)(4/26 actual)    · Usual Body Weight: (UTO, no wt hx on file)     · Ideal Body Weight: 178 lbs; % Ideal Body Weight 148.9 %   · BMI: 35.9  · BMI Categories: Obese Class 2 (BMI 35.0 -39.9)       Nutrition Diagnosis:   · Increased nutrient needs related to catabolic illness(renal cell CA w/ mets) as evidenced by poor intake prior to admission    Nutrition Interventions:   Food

## 2021-05-10 NOTE — PROGRESS NOTES
Occupational Therapy  OT BEDSIDE TREATMENT NOTE      Date:5/10/2021  Patient Name: Colin Reyes  MRN: [id number]  : 1965  Room: [address] [place] [address]     Evaluating OT: Marquise Del Castillo OTR/L   Referring provider: BLESSING Arias     AM-PAC Daily Activity Raw Score:   Recommended Adaptive Equipment: continue to assess     Diagnosis: Thoracic Back Pain   Surgery: S/p T12 kyphoplasty and T12 laminectomy for tumor decompression on     Pertinent Medical History: Arthritis, DM, CTS  Additional information: pt being re-evaluated s/p surgery      Precautions:  Falls, Soft TLSO,HOB < 45*, Spinal precautions      Home Living: Pt lives with wife in a 1 story home with no steps to enter and no HR, ramp to enter. .  Bathroom setup: tub/shower, std. Commodes, walk in shower in basement   Equipment owned: none     Prior Level of Function: Ind. with ADLs , Ind. with IADLs; ambulated no A.D. Driving: active  Occupation:      Pain Level: Pt reports 9/10 \"all over pain\"    Cognition: A&O: 3; Follows 1-2 step directions, pleasant & cooperative               Memory:Fair              Sequencing: Fair              Problem solving: Fair              Judgement/safety: F- anxious, fearful, hard to focus due to pain                 Functional Assessment:    Initial Eval Status  Date:  Treatment Status  Date:  5/10/21 Short Term Goals  Treatment frequency: 1-3x/week on PRN    Feeding Setup A Set up     Grooming/Hygiene Mod I bed level Set up   Upright in bed   Mod I seated in chair       UB Dressing Mod A Mod A  Per last tx   Dep  marivel TLSO   Mod I seated       LB Dressing DEP Dependent  To don socks and shoes seated semi upright in bed   Mod A    Bathing Max A Max A  simulated seated upright in bed     Toileting DEP Max A  Per last tx  Mod A   Bed Mobility  Supine to sit:  Max Ax2  Sit to supine: Max Ax2  Max A- rolling  Max A x 2- supine<->sit  Educated pt on technique to increase independence.         Functional Transfers Sit to stand: Max Ax2  Stand to sit: Max Ax2  Assist of 3rd person d/t BLE buckling  Max A x 2   per last tx  Pt declined this date due to pain Mod A   Functional Mobility NT N/T   Mod A    Balance Sitting:      Static: Max A    Dynamic:Max A  Standing: Max Ax2  Sitting:      Static: Max A    Dynamic: Max A  Standing: N/T     Endurance/Activity Tolerance F-; limited by pain and weakness  Poor  F+     Visual/  Perceptual Glasses: No; WFL          UE strengthening     See UE Assessment Below  Fair  G tolerance  For BUE AROM/AAROM exercises in all planes to improve overall function for ADL tasks      Comments: Upon arrival pt semi upright in bed & agreeable for therapy. Educated pt on spinal precautions, demonstrates Fair- understanding. Pt educated on adaptive techniques to increase independence and safety during ADL's and bed mobility while maintaining precautions. At end of session pt was returned to bed, with TLSO brace in place, HOB upright, all lines in place & call light within reach. · Pt has made slow progress towards set goals.    · Continue with current plan of care    Treatment Time In: 10:30          Treatment Time Out: 11:00           Treatment Charges: Mins Units   Ther Ex  30056     Manual Therapy 31924     Thera Activities 71261 15 1   ADL/Home Mgt 55877 15 1   Neuro Re-ed 38051     Group Therapy      Orthotic manage/training  37823     Non-Billable Time     Total Timed Treatment 30 [address], [place]

## 2021-05-10 NOTE — PATIENT INSTRUCTIONS
RANCHO Pires. Simone Sosa MD [place] [address] Oncology  Cell: [phone]    [facility]:  [phone]   FAX: [phone] [address] [facility]:  [address] [place] [address] Avenue:    [phone]  [address] [place] [address] Road:  [phone]   FAX:  [phone]  Email: [email or web address]

## 2021-05-10 NOTE — CARE COORDINATION
5/10/2021 social work transition of care planning  Pt awaiting transfer to [facility]. Envelope in soft chart.   Electronically signed by AIYANA Wheat on 5/10/2021 at 7:46 AM

## 2021-05-10 NOTE — PROGRESS NOTES
Call from [address] [place] [address] - no bed available at this time.   They will call later with new update

## 2021-05-10 NOTE — PROGRESS NOTES
Subjective:  Patient is waiting for bed to [facility]. He was worried that his pain will be worse after working with PT. I told him to make sure he has his pain medications prior to PT. No chest pain or SOB. No abdominal pain. Objective:    BP (!) 152/87   Pulse 95   Temp 97.5 °F (36.4 °C) (Temporal)   Resp 18   Ht 6' (1.829 m)   Wt 283 lb (128.4 kg)   SpO2 93%   BMI 38.38 kg/m²     General: NAD, obese. HEENT: No thrush or mucositis, EOMI, PERRLA  Heart:  RRR, no murmurs, gallops, or rubs.   Lungs:  CTA bilaterally, no wheeze, rales or rhonchi  Abd: bowel sounds present, nontender, nondistended, no masses  Extrem:  No clubbing, cyanosis, or edema  Lymphatics: No palpable adenopathy in cervical and supraclavicular regions  Skin: Intact, no petechia or purpura    CBC with Differential:    Lab Results   Component Value Date    WBC 10.5 05/10/2021    RBC 5.60 05/10/2021    HGB 15.1 05/10/2021    HCT 48.8 05/10/2021     05/10/2021    MCV 87.1 05/10/2021    MCH 27.0 05/10/2021    MCHC 30.9 05/10/2021    RDW 12.9 05/10/2021    LYMPHOPCT 31.2 05/08/2021    MONOPCT 10.0 05/08/2021    BASOPCT 0.8 05/08/2021    MONOSABS 0.98 05/08/2021    LYMPHSABS 3.06 05/08/2021    EOSABS 0.47 05/08/2021    BASOSABS 0.08 05/08/2021     CMP:    Lab Results   Component Value Date     05/10/2021    K 4.7 05/10/2021    K 4.4 04/29/2021    CL 93 05/10/2021    CO2 31 05/10/2021    BUN 11 05/10/2021    CREATININE 0.6 05/10/2021    GFRAA >60 05/10/2021    LABGLOM >60 05/10/2021    GLUCOSE 150 05/10/2021    PROT 6.8 05/08/2021    LABALBU 3.2 05/08/2021    CALCIUM 10.0 05/10/2021    BILITOT 0.2 05/08/2021    ALKPHOS 83 05/08/2021    AST 10 05/08/2021    ALT 10 05/08/2021         Current Facility-Administered Medications:     pregabalin (LYRICA) capsule 150 mg, 150 mg, Oral, BID, Janki Alexander MD, 150 mg at 05/10/21 0835    DULoxetine (CYMBALTA) extended release capsule 30 mg, 30 mg, Oral, Daily, Janki Alexander MD, 30 mg at 05/10/21 0835    oxyCODONE (OXYCONTIN) extended release tablet 20 mg, 20 mg, Oral, 2 times per day, Abbi Bowie MD, 20 mg at 05/10/21 0835    [DISCONTINUED] oxyCODONE (ROXICODONE) immediate release tablet 5 mg, 5 mg, Oral, Q4H PRN **OR** oxyCODONE HCl (OXY-IR) immediate release tablet 10 mg, 10 mg, Oral, Q4H PRN, Abbi Bowie MD, 10 mg at 05/10/21 0458    metFORMIN (GLUCOPHAGE) tablet 500 mg, 500 mg, Oral, BID WC, Abbi Bowie MD, 500 mg at 05/10/21 0836    enoxaparin (LOVENOX) injection 40 mg, 40 mg, Subcutaneous, Daily, Ashlyn Hudson PA-C, 40 mg at 05/10/21 7252    bisacodyl (DULCOLAX) suppository 10 mg, 10 mg, Rectal, Daily PRN, BLESSING Wiley    HYDROmorphone (DILAUDID) injection 1 mg, 1 mg, Intravenous, Q4H PRN, 1 mg at 05/10/21 0837 **OR** [DISCONTINUED] HYDROmorphone (DILAUDID) injection 0.5 mg, 0.5 mg, Intravenous, Q4H PRN, Abbi Bowie MD    sodium chloride flush 0.9 % injection 5-40 mL, 5-40 mL, Intravenous, 2 times per day, BLESSING Wiley, 10 mL at 05/10/21 1003    diazePAM (VALIUM) tablet 5 mg, 5 mg, Oral, Q6H PRN, BLESSING Wiley, 5 mg at 05/08/21 0539    sodium chloride flush 0.9 % injection 5-40 mL, 5-40 mL, Intravenous, PRN, BLESSING Wiley    0.9 % sodium chloride infusion, 25 mL, Intravenous, PRN, BLESSING Wiley    promethazine (PHENERGAN) tablet 12.5 mg, 12.5 mg, Oral, Q6H PRN **OR** ondansetron (ZOFRAN) injection 4 mg, 4 mg, Intravenous, Q6H PRN, BLESSING Wiley    sennosides-docusate sodium (SENOKOT-S) 8.6-50 MG tablet 1 tablet, 1 tablet, Oral, BID, BLESSING Wiley, 1 tablet at 05/10/21 0835    polyethylene glycol (GLYCOLAX) packet 17 g, 17 g, Oral, BID, BLESSING Wiley, 17 g at 05/09/21 2023    nicotine (NICODERM CQ) 21 MG/24HR 1 patch, 1 patch, Transdermal, Daily, BLESSING Wiley, 1 patch at 05/10/21 0649    acetaminophen (TYLENOL) tablet 1,000 mg, 1,000 mg, Oral, TID, BLESSING Wiley, 1,000 mg at 05/10/21 0836   lamoTRIgine (LAMICTAL) tablet 100 mg, 100 mg, Oral, BID, BLESSING Arias, 100 mg at 05/10/21 0836    sodium chloride flush 0.9 % injection 10 mL, 10 mL, Intravenous, 2 times per day, BLESSING Arias, 10 mL at 05/10/21 0836    sodium chloride flush 0.9 % injection 10 mL, 10 mL, Intravenous, PRN, BLESSING Arias, 10 mL at 05/07/21 1731    0.9 % sodium chloride infusion, 25 mL, Intravenous, PRN, BLESSING Arias    potassium chloride (KLOR-CON M) extended release tablet 40 mEq, 40 mEq, Oral, PRN **OR** potassium bicarb-citric acid (EFFER-K) effervescent tablet 40 mEq, 40 mEq, Oral, PRN **OR** potassium chloride 10 mEq/100 mL IVPB (Peripheral Line), 10 mEq, Intravenous, PRN, BLESSING Arias    magnesium hydroxide (MILK OF MAGNESIA) 400 MG/5ML suspension 30 mL, 30 mL, Oral, Daily PRN, BLESSING Arias, 30 mL at 05/10/21 0558    glucose (GLUTOSE) 40 % oral gel 15 g, 15 g, Oral, PRN, BLESSING Arias    dextrose 50 % IV solution, 12.5 g, Intravenous, PRN, BLESSING Arias    glucagon (rDNA) injection 1 mg, 1 mg, Intramuscular, PRN, BLESSING Arias    dextrose 5 % solution, 100 mL/hr, Intravenous, PRN, BLESSING Arias    insulin lispro (HUMALOG) injection vial 0-6 Units, 0-6 Units, Subcutaneous, TID WC, BLESSING Arias, 1 Units at 05/10/21 0836    insulin lispro (HUMALOG) injection vial 0-3 Units, 0-3 Units, Subcutaneous, Nightly, BLESSING Arias, 1 Units at 05/09/21 2030    trimethobenzamide (TIGAN) injection 200 mg, 200 mg, Intramuscular, Q6H PRN, BLESSING Arias    lisinopril (PRINIVIL;ZESTRIL) tablet 20 mg, 20 mg, Oral, Daily, BLESSING Arias, 20 mg at 05/10/21 2520    Assessment:    Principal Problem:    Sarcomatoid renal cell carcinoma (HCC)  Active Problems:    Hyponatremia    Diabetes mellitus (HCC)    Tobacco abuse    Adrenal mass (HCC)    Burst fracture of twelfth thoracic vertebra (HCC)    Spinal cord compression (HCC)    Acute thoracic back pain    Pathologic fracture    Spinal cord tumor  Resolved Problems:    * No resolved hospital problems. *    60-year-old gentleman with large R renal mass, epidural mass, possible adrenal gland lesion and a pathologic T12 compression fracture s/p excision of mass and T12 debulking and biopsy on 4/30/21, pathology confirmed poorly differentiated carcinoma with spindle cell features suggestive for sarcomatoid renal cell carcinoma. Bone scan revealed uptake within T4, T8, T12.     Plan:  -Unfortunately has metastatic RCC to thoraic levels and likely adrenal.    -He will require systemic therapy, dual immunotherapy versus TKI therapy.    -Palliative radiation to the T12 lesion- will follow-up in 2 weeks pending [facility] eval.   -Waiting for bed at the [facility]. -Lovenox for prophylaxis.  -He was encouraged to work with PT/OT, take the pain medication prior to doing so to help increase his activity.   -We will continue to follow along.      Electronically signed by KARLA Mosqueda NP on 5/10/2021 at 12:09 PM

## 2021-05-10 NOTE — CONSULTS
Radiation Oncology          -chart reviewed  -imaging reviewed  -care coordinated with NS, rec 23 weeks healing prior to RT  -adjuvant palliative RT indicated    -will sched for OP consul tin 2 weeks pending [facility] eval    -N/C      Aaron Forrest.  Gaynel Kayser, MD [place] [address] Oncology  Cell: [phone]    [address]:  [phone]   FAX: [phone]  [facility]:  [address] [place] [address] Avenue:    [phone]  [address] [place] [address] Road:  [address] [place] [address] Road:  [phone]

## 2021-05-10 NOTE — PROGRESS NOTES
changes at the thoracolumbar junction. Nonspecific right adrenal mass and upper it did in the nail and a abdomen   lymphadenopathy. Atelectasis in the lung bases. Consider further assessment by PET-CT scan. CT abdomen and pelvis. The liver is decreased in attenuation likely liver disease or fatty   infiltration. The gallbladder is distended. Consider ultrasonography. Spleen, pancreas, and the left adrenal gland appear normal.  A previously   noted right adrenal mass measuring 2.3 x 3.4 cm is identified. Left kidney   is normal except for a 1.2 cm cystic lesion in the left kidney. There is a   large complex cystic and solid mass measuring 5.2 x 4.7 x 6.4 cm involving   the inferior pole of the right kidney. Additional complex hypodense lesions   are identified in the right kidney, the largest 1 measuring 2.8 x 2.6 cm. Multifocal malignancy is considered. Pyelonephritis with abscess is less   likely. Previously noted compression fracture of T12 is noted with   postoperative changes in the thoracolumbar junction. Pelvis. Bladder is distended. Prostate gland is prominent. Fat containing   right inguinal hernia is noted. There is constipation. The appendix is   normal.      Impression      Heterogeneous appearance of the right kidney with complex mass in the i   inferior pole concerning for necrotic malignancy. Additional hypodense   lesions are also identified. Differential consideration will also include   multifocal pyelonephritis with abscess and or malignancy. Postsurgical changes in the thoracolumbar junction with previous   vertebroplasty 8 T12. Further assessment by PET-CT scan may be considered. FLUORO FOR SURGICAL PROCEDURES   Final Result   Intraprocedural fluoroscopic spot images as above. See separate procedure   report for more information. XR CHEST PORTABLE   Final Result   No acute process.          MRI LUMBAR SPINE WO CONTRAST Final Result   Acute to subacute superior endplate compression fracture of T12 with 40%   height loss and 5 mm retropulsion into the spinal canal.  There is associated   extensive anterior epidural hematoma at this level which contributes to   severe canal stenosis with cord compression at the level of T12 and moderate   bilateral neural foraminal narrowing at T11-T12 and T12-L1. No evidence of spondylo discitis. Chronic superior endplate compression fracture of T8 with 25% height loss. The findings were sent to the Radiology Results [address] at 7:56   pm on 4/28/2021to be communicated to a licensed caregiver. MRI THORACIC SPINE WO CONTRAST   Final Result   Acute to subacute superior endplate compression fracture of T12 with 40%   height loss and 5 mm retropulsion into the spinal canal.  There is associated   extensive anterior epidural hematoma at this level which contributes to   severe canal stenosis with cord compression at the level of T12 and moderate   bilateral neural foraminal narrowing at T11-T12 and T12-L1. No evidence of spondylo discitis. Chronic superior endplate compression fracture of T8 with 25% height loss. The findings were sent to the Radiology Results [address] at 7:56   pm on 4/28/2021to be communicated to a licensed caregiver. CT THORACIC SPINE W CONTRAST   Final Result   1. Subacute T12 compression fracture demonstrating 30% loss of height. 2. Mild spinal canal stenosis at T11-T12 secondary to 4 mm of retropulsion of   the posterior margin of the T12 vertebral body into the spinal canal.   3. No findings to suggest discitis/osteomyelitis. 4. Indeterminate 2.2 cm right adrenal nodule for which an MRI with contrast   is recommended for further characterization.              Assessment    Principal Problem:    Sarcomatoid renal cell carcinoma (HCC)  Active Problems:    Hyponatremia    Diabetes mellitus (HCC)    Tobacco abuse    Adrenal mass (HCC)    Burst fracture of twelfth thoracic vertebra (HCC)    Spinal cord compression (HCC)    Acute thoracic back pain    Pathologic fracture    Spinal cord tumor  Resolved Problems:    * No resolved hospital problems. *      Plan:    50-year-old gentleman with pathologic T12 compression fracture s/p T12 Laminectomy and a large right renal mass; possible adrenal gland lesion pathology confirming high grade sarcomatoid renal cell carcinoma. T12 kyphoplasty and T12 laminectomy for tumor decompression on 4/30    Path is back showing right sided high grade sarcomatoid renal call carcinoma. R adrenal mass of unclear significance      Per heme-onc, this disease is aggressive and carries a poor prognosis. He needs timely consideration of debulking nephrectomy and follow-up radiation/chemo at a quatern[facility] referral center that has some experience dealing with this.     Diabetes-Metformin, MBS/SSI    Hypertension-lisinopril-monitor    Hyponatremia improved     Tobacco cessation      Pain management consult appreciated Lyrica, duloxetine, OxyContin, OxyIR, Dilaudid IV, acetaminophen, Valium continue current regimen     Disposition-awaiting bed at [facility] - Still no bed available    DVT prophylaxis  PT OT  Discharge planning  Case discussed with attending and agreed upon plan of care. Lizzeth Gonzalez, KARLA - CNP    5/10/2021    10:54 AM     Still having a lot of pain described this sharp sudden shock all through his body  Still awaiting bed for transfer to [place]  Increase OxyContin dose secondary to worsening pain, try to wean off IV Dilaudid  We will try to add nonnarcotic medication for better pain control    I personally saw, examined and provided care for the patient. Radiographs, labs and medication list were reviewed by me independently. The case was discussed in detail and plans for care were established.  Review of [address] [place] [address], documentation was conducted and revisions were made as appropriate directly by me. I agree with the above documented exam, problem list, and plan of care.      Martínez Sanon MD  1:47 PM  5/10/2021

## 2021-05-10 NOTE — PROGRESS NOTES
Department of Neurosurgery  Progress Note    CHIEF COMPLAINT: s/p T12 kyphoplasty/laminectomy    SUBJECTIVE:  Minimal op site pain. C/o bilateral anterior thigh pain    REVIEW OF SYSTEMS :  Constitutional: Negative for chills and fever. Neurological: Negative for dizziness, tremors and speech change. OBJECTIVE:   VITALS:  BP (!) 152/87   Pulse 95   Temp 97.5 °F (36.4 °C) (Temporal)   Resp 18   Ht 6' (1.829 m)   Wt 283 lb (128.4 kg)   SpO2 93%   BMI 38.38 kg/m²   PHYSICAL:  CONSTITUTIONAL:  awake, alert, cooperative, no apparent distress, and appears stated age and FINE/FC.   3/5 iliopsoas and quad strength    DATA:  CBC:   Lab Results   Component Value Date    WBC 10.5 05/10/2021    RBC 5.60 05/10/2021    HGB 15.1 05/10/2021    HCT 48.8 05/10/2021    MCV 87.1 05/10/2021    MCH 27.0 05/10/2021    MCHC 30.9 05/10/2021    RDW 12.9 05/10/2021     05/10/2021    MPV 10.0 05/10/2021     BMP:    Lab Results   Component Value Date     05/10/2021    K 4.7 05/10/2021    K 4.4 04/29/2021    CL 93 05/10/2021    CO2 31 05/10/2021    BUN 11 05/10/2021    LABALBU 3.2 05/08/2021    CREATININE 0.6 05/10/2021    CALCIUM 10.0 05/10/2021    GFRAA >60 05/10/2021    LABGLOM >60 05/10/2021    GLUCOSE 150 05/10/2021     PT/INR:    Lab Results   Component Value Date    PROTIME 12.2 04/30/2021    INR 1.1 04/30/2021     PTT:    Lab Results   Component Value Date    APTT 33.8 04/30/2021   [APTT}    Current Inpatient Medications  Current Facility-Administered Medications: pregabalin (LYRICA) capsule 150 mg, 150 mg, Oral, BID  DULoxetine (CYMBALTA) extended release capsule 30 mg, 30 mg, Oral, Daily  oxyCODONE (OXYCONTIN) extended release tablet 20 mg, 20 mg, Oral, 2 times per day  [DISCONTINUED] oxyCODONE (ROXICODONE) immediate release tablet 5 mg, 5 mg, Oral, Q4H PRN **OR** oxyCODONE HCl (OXY-IR) immediate release tablet 10 mg, 10 mg, Oral, Q4H PRN  metFORMIN (GLUCOPHAGE) tablet 500 mg, 500 mg, Oral, BID WC  enoxaparin (LOVENOX) injection 40 mg, 40 mg, Subcutaneous, Daily  bisacodyl (DULCOLAX) suppository 10 mg, 10 mg, Rectal, Daily PRN  HYDROmorphone (DILAUDID) injection 1 mg, 1 mg, Intravenous, Q4H PRN **OR** [DISCONTINUED] HYDROmorphone (DILAUDID) injection 0.5 mg, 0.5 mg, Intravenous, Q4H PRN  sodium chloride flush 0.9 % injection 5-40 mL, 5-40 mL, Intravenous, 2 times per day  diazePAM (VALIUM) tablet 5 mg, 5 mg, Oral, Q6H PRN  sodium chloride flush 0.9 % injection 5-40 mL, 5-40 mL, Intravenous, PRN  0.9 % sodium chloride infusion, 25 mL, Intravenous, PRN  promethazine (PHENERGAN) tablet 12.5 mg, 12.5 mg, Oral, Q6H PRN **OR** ondansetron (ZOFRAN) injection 4 mg, 4 mg, Intravenous, Q6H PRN  sennosides-docusate sodium (SENOKOT-S) 8.6-50 MG tablet 1 tablet, 1 tablet, Oral, BID  polyethylene glycol (GLYCOLAX) packet 17 g, 17 g, Oral, BID  nicotine (NICODERM CQ) 21 MG/24HR 1 patch, 1 patch, Transdermal, Daily  acetaminophen (TYLENOL) tablet 1,000 mg, 1,000 mg, Oral, TID  lamoTRIgine (LAMICTAL) tablet 100 mg, 100 mg, Oral, BID  sodium chloride flush 0.9 % injection 10 mL, 10 mL, Intravenous, 2 times per day  sodium chloride flush 0.9 % injection 10 mL, 10 mL, Intravenous, PRN  0.9 % sodium chloride infusion, 25 mL, Intravenous, PRN  potassium chloride (KLOR-CON M) extended release tablet 40 mEq, 40 mEq, Oral, PRN **OR** potassium bicarb-citric acid (EFFER-K) effervescent tablet 40 mEq, 40 mEq, Oral, PRN **OR** potassium chloride 10 mEq/100 mL IVPB (Peripheral Line), 10 mEq, Intravenous, PRN  magnesium hydroxide (MILK OF MAGNESIA) 400 MG/5ML suspension 30 mL, 30 mL, Oral, Daily PRN  glucose (GLUTOSE) 40 % oral gel 15 g, 15 g, Oral, PRN  dextrose 50 % IV solution, 12.5 g, Intravenous, PRN  glucagon (rDNA) injection 1 mg, 1 mg, Intramuscular, PRN  dextrose 5 % solution, 100 mL/hr, Intravenous, PRN  insulin lispro (HUMALOG) injection vial 0-6 Units, 0-6 Units, Subcutaneous, TID WC  insulin lispro (HUMALOG) injection vial 0-3 Units, 0-3 Units, Subcutaneous, Nightly  trimethobenzamide (TIGAN) injection 200 mg, 200 mg, Intramuscular, Q6H PRN  lisinopril (PRINIVIL;ZESTRIL) tablet 20 mg, 20 mg, Oral, Daily    ASSESSMENT:   · S/p T12 kyphoplasty and T12 laminectomy for tumor decompression on 4/30    PLAN:  · WBAT with TLSO  · Pain control. Seen by pain management.   · lovenox   · Oncology following      Electronically signed by BLESSING Dela Cruz on 5/10/2021 at 12:10 PM

## 2021-05-10 NOTE — PROGRESS NOTES
is A & O x 4  Sensation:  No reported paresthesias  Edema:  None noted     Patient education  Pt educated on role of PT    Patient response to education:   Pt verbalized understanding Pt demonstrated skill Pt requires further education in this area   x x x     ASSESSMENT:    Comments:  Pt received in supine agreeable to PT. Pt educated on spinal precautions. Pt continues to require cues to perform rolling in bed and total assist to don soft TLSO. Pt performing supine to sit transfer with assist of trunk and BLEs. Pt sitting statically with heavy assist as pt with posterior trunk lean to \"take pressure off B anterior hips\". Pt adamantly declining sit to stand limited by pain. Patient would benefit from continued skilled PT to maximize functional mobility independence. Treatment:  Patient practiced and was instructed in the following treatment:     Bed mobility- verbal cues to facilitate independence   Neuromuscular reeducation- verbal cues to correct posterior trunk lean and promote UE support. PLAN:      PLAN OF CARE:    Current Treatment Recommendations     [x] Strengthening     [x] ROM   [x] Balance Training   [x] Endurance Training   [x] Transfer Training   [x] Gait Training   [] Stair Training   [x] Positioning   [x] Safety and Education Training   [x] Patient/Caregiver Education   [x] HEP  [] Other       Patient is making good progress towards established goals. Will continue with current POC.       Time in  1043  Time out  1107    Total Treatment Time  24 minutes     CPT codes:  [] Gait training 50303 0 minutes  [] Manual therapy 73633 0 minutes  [x] Therapeutic activities 31788 24 minutes  [] Therapeutic exercises 03750 0 minutes  [] Neuromuscular reeducation 88707 0 minutes    Jean Jarquin PT, DPT  [id number]

## 2021-05-11 LAB
METER GLUCOSE: 173 MG/DL (ref 74–99)
METER GLUCOSE: 184 MG/DL (ref 74–99)
METER GLUCOSE: 200 MG/DL (ref 74–99)
METER GLUCOSE: 212 MG/DL (ref 74–99)

## 2021-05-11 PROCEDURE — 6370000000 HC RX 637 (ALT 250 FOR IP): Performed by: INTERNAL MEDICINE

## 2021-05-11 PROCEDURE — 2580000003 HC RX 258: Performed by: PHYSICIAN ASSISTANT

## 2021-05-11 PROCEDURE — 6360000002 HC RX W HCPCS: Performed by: PHYSICIAN ASSISTANT

## 2021-05-11 PROCEDURE — 6370000000 HC RX 637 (ALT 250 FOR IP): Performed by: PHYSICIAN ASSISTANT

## 2021-05-11 PROCEDURE — 82962 GLUCOSE BLOOD TEST: CPT

## 2021-05-11 PROCEDURE — 1200000000 HC SEMI PRIVATE

## 2021-05-11 PROCEDURE — 6360000002 HC RX W HCPCS: Performed by: INTERNAL MEDICINE

## 2021-05-11 RX ADMIN — HYDROMORPHONE HYDROCHLORIDE 1 MG: 1 INJECTION, SOLUTION INTRAMUSCULAR; INTRAVENOUS; SUBCUTANEOUS at 19:17

## 2021-05-11 RX ADMIN — ACETAMINOPHEN 1000 MG: 500 TABLET, FILM COATED ORAL at 15:22

## 2021-05-11 RX ADMIN — Medication 10 ML: at 08:11

## 2021-05-11 RX ADMIN — OXYCODONE HYDROCHLORIDE 10 MG: 10 TABLET ORAL at 17:59

## 2021-05-11 RX ADMIN — ACETAMINOPHEN 1000 MG: 500 TABLET, FILM COATED ORAL at 21:15

## 2021-05-11 RX ADMIN — PREGABALIN 150 MG: 150 CAPSULE ORAL at 08:11

## 2021-05-11 RX ADMIN — INSULIN LISPRO 1 UNITS: 100 INJECTION, SOLUTION INTRAVENOUS; SUBCUTANEOUS at 12:08

## 2021-05-11 RX ADMIN — Medication 10 ML: at 21:44

## 2021-05-11 RX ADMIN — Medication 10 ML: at 21:18

## 2021-05-11 RX ADMIN — OXYCODONE HYDROCHLORIDE 40 MG: 20 TABLET, FILM COATED, EXTENDED RELEASE ORAL at 08:16

## 2021-05-11 RX ADMIN — HYDROMORPHONE HYDROCHLORIDE 1 MG: 1 INJECTION, SOLUTION INTRAMUSCULAR; INTRAVENOUS; SUBCUTANEOUS at 11:33

## 2021-05-11 RX ADMIN — INSULIN LISPRO 2 UNITS: 100 INJECTION, SOLUTION INTRAVENOUS; SUBCUTANEOUS at 08:17

## 2021-05-11 RX ADMIN — DIAZEPAM 5 MG: 5 TABLET ORAL at 08:23

## 2021-05-11 RX ADMIN — ENOXAPARIN SODIUM 40 MG: 40 INJECTION SUBCUTANEOUS at 08:10

## 2021-05-11 RX ADMIN — INSULIN LISPRO 1 UNITS: 100 INJECTION, SOLUTION INTRAVENOUS; SUBCUTANEOUS at 21:20

## 2021-05-11 RX ADMIN — METFORMIN HYDROCHLORIDE 500 MG: 500 TABLET ORAL at 08:12

## 2021-05-11 RX ADMIN — Medication 10 ML: at 10:02

## 2021-05-11 RX ADMIN — LAMOTRIGINE 100 MG: 100 TABLET ORAL at 23:00

## 2021-05-11 RX ADMIN — ACETAMINOPHEN 1000 MG: 500 TABLET, FILM COATED ORAL at 08:11

## 2021-05-11 RX ADMIN — PREGABALIN 150 MG: 150 CAPSULE ORAL at 21:15

## 2021-05-11 RX ADMIN — HYDROMORPHONE HYDROCHLORIDE 1 MG: 1 INJECTION, SOLUTION INTRAMUSCULAR; INTRAVENOUS; SUBCUTANEOUS at 03:30

## 2021-05-11 RX ADMIN — LAMOTRIGINE 100 MG: 100 TABLET ORAL at 08:12

## 2021-05-11 RX ADMIN — DIAZEPAM 5 MG: 5 TABLET ORAL at 19:23

## 2021-05-11 RX ADMIN — LISINOPRIL 20 MG: 20 TABLET ORAL at 08:12

## 2021-05-11 RX ADMIN — SENNOSIDES AND DOCUSATE SODIUM 1 TABLET: 8.6; 5 TABLET ORAL at 21:15

## 2021-05-11 RX ADMIN — DULOXETINE HYDROCHLORIDE 30 MG: 30 CAPSULE, DELAYED RELEASE ORAL at 08:12

## 2021-05-11 RX ADMIN — OXYCODONE HYDROCHLORIDE 10 MG: 10 TABLET ORAL at 05:29

## 2021-05-11 RX ADMIN — OXYCODONE HYDROCHLORIDE 40 MG: 20 TABLET, FILM COATED, EXTENDED RELEASE ORAL at 21:14

## 2021-05-11 RX ADMIN — SENNOSIDES AND DOCUSATE SODIUM 1 TABLET: 8.6; 5 TABLET ORAL at 08:10

## 2021-05-11 RX ADMIN — INSULIN LISPRO 1 UNITS: 100 INJECTION, SOLUTION INTRAVENOUS; SUBCUTANEOUS at 17:13

## 2021-05-11 RX ADMIN — OXYCODONE HYDROCHLORIDE 10 MG: 10 TABLET ORAL at 13:55

## 2021-05-11 RX ADMIN — Medication 10 ML: at 19:17

## 2021-05-11 RX ADMIN — METFORMIN HYDROCHLORIDE 500 MG: 500 TABLET ORAL at 21:14

## 2021-05-11 ASSESSMENT — PAIN DESCRIPTION - FREQUENCY
FREQUENCY: CONTINUOUS

## 2021-05-11 ASSESSMENT — PAIN DESCRIPTION - DIRECTION
RADIATING_TOWARDS: BLE
RADIATING_TOWARDS: BKE
RADIATING_TOWARDS: BLE

## 2021-05-11 ASSESSMENT — PAIN DESCRIPTION - ONSET
ONSET: ON-GOING

## 2021-05-11 ASSESSMENT — PAIN DESCRIPTION - DESCRIPTORS
DESCRIPTORS: ACHING;CONSTANT;DISCOMFORT
DESCRIPTORS: ACHING;CONSTANT;CRAMPING
DESCRIPTORS: ACHING;CONSTANT;DISCOMFORT
DESCRIPTORS: ACHING;CONSTANT;DISCOMFORT
DESCRIPTORS: ACHING;CONSTANT;CRUSHING
DESCRIPTORS: ACHING;CONSTANT;CRAMPING
DESCRIPTORS: ACHING;CONSTANT;DISCOMFORT

## 2021-05-11 ASSESSMENT — PAIN DESCRIPTION - LOCATION
LOCATION: BACK

## 2021-05-11 ASSESSMENT — PAIN DESCRIPTION - ORIENTATION
ORIENTATION: MID

## 2021-05-11 ASSESSMENT — PAIN DESCRIPTION - PROGRESSION
CLINICAL_PROGRESSION: NOT CHANGED

## 2021-05-11 ASSESSMENT — PAIN SCALES - GENERAL
PAINLEVEL_OUTOF10: 9
PAINLEVEL_OUTOF10: 0
PAINLEVEL_OUTOF10: 0
PAINLEVEL_OUTOF10: 10
PAINLEVEL_OUTOF10: 9
PAINLEVEL_OUTOF10: 0
PAINLEVEL_OUTOF10: 10
PAINLEVEL_OUTOF10: 0
PAINLEVEL_OUTOF10: 8
PAINLEVEL_OUTOF10: 8
PAINLEVEL_OUTOF10: 7
PAINLEVEL_OUTOF10: 9
PAINLEVEL_OUTOF10: 7
PAINLEVEL_OUTOF10: 6
PAINLEVEL_OUTOF10: 10
PAINLEVEL_OUTOF10: 0

## 2021-05-11 ASSESSMENT — PAIN DESCRIPTION - PAIN TYPE
TYPE: SURGICAL PAIN

## 2021-05-11 NOTE — PLAN OF CARE
Problem: Pain:  Goal: Pain level will decrease  Description: Pain level will decrease  5/11/2021 0737 by Sheila Payne RN  Outcome: Ongoing  5/10/2021 2355 by Saskia Monique RN  Outcome: Met This Shift  Goal: Control of acute pain  Description: Control of acute pain  Outcome: Met This Shift     Problem: Falls - Risk of:  Goal: Will remain free from falls  Description: Will remain free from falls  5/11/2021 0737 by Sheila Payne RN  Outcome: Met This Shift  5/10/2021 2355 by Saskia Monique RN  Outcome: Met This Shift  Goal: Absence of physical injury  Description: Absence of physical injury  5/10/2021 2355 by Saskia Monique RN  Outcome: Met This Shift     Problem: Skin Integrity:  Goal: Will show no infection signs and symptoms  Description: Will show no infection signs and symptoms  Outcome: Ongoing

## 2021-05-11 NOTE — PROGRESS NOTES
Date: 2021       Patient Name: Colin Reyes  : 1965      MRN: [id number]  Patient declined physical therapy due to pain. Max encouragement provided, education on benefits of mobilization. Will follow up as able.     Javier Hurst PT, DPT  [id number]

## 2021-05-11 NOTE — PROGRESS NOTES
Occupational Therapy  OT BEDSIDE TREATMENT NOTE      Date:2021  Patient Name: Benjie Evangelista  MRN: [id number]  : 1965  Room: [address] [place] [address]       Pt's chart reviewed and treatment attempted, pt declined this date due to pain. Educated pt on importance of OOB activity and participation in therapy. Will attempt at a later time/date.             Lorraine Bronson

## 2021-05-11 NOTE — PROGRESS NOTES
Subjective:    Pain well controlled  No acute events overnight  Patient denies chest pain, shortness of breath, fever, and chills    Objective:    /79   Pulse 94   Temp 97.4 °F (36.3 °C) (Temporal)   Resp 18   Ht 6' (1.829 m)   Wt 283 lb (128.4 kg)   SpO2 98%   BMI 38.38 kg/m²     24HR INTAKE/OUTPUT:      Intake/Output Summary (Last 24 hours) at 5/11/2021 1204  Last data filed at 5/11/2021 0847  Gross per 24 hour   Intake 480 ml   Output --   Net 480 ml       General appearance: NAD, conversant, pleasant   Neck: FROM, supple   Lungs: Clear bilaterally no wheezes, no rhonchi, no crackles  CV: RRR, no MRGs; normal carotid upstroke and amplitude without Bruits  Abdomen: Soft, non-tender; no masses or HSM  Extremities: No edema, no cyanosis, no clubbing  Skin: Intact no rash, no lesions, no ulcers    Psych: Alert and oriented normal affect  Neuro: 5/5 strength bilateral lower extremities      Most Recent Labs  Lab Results   Component Value Date    WBC 10.5 05/10/2021    HGB 15.1 05/10/2021    HCT 48.8 05/10/2021     05/10/2021     05/10/2021    K 4.7 05/10/2021    CL 93 (L) 05/10/2021    CREATININE 0.6 (L) 05/10/2021    BUN 11 05/10/2021    CO2 31 (H) 05/10/2021    GLUCOSE 150 (H) 05/10/2021    ALT 10 05/08/2021    AST 10 05/08/2021    INR 1.1 04/30/2021    LABA1C 8.7 (H) 04/26/2021     No results for input(s): MG in the last 72 hours.   Lab Results   Component Value Date    CALCIUM 10.0 05/10/2021        NM BONE SCAN WHOLE BODY   Final Result   Findings compatible with degenerative changes    Increased tracer uptake within T4 T8 and T12 as described   Increased tracer uptake at the manubrium of the sternum on the left at   its junction with the clavicle possibly traumatic, please correlate   clinically      US DUP LOWER EXTREMITIES BILATERAL VENOUS   Final Result   Within the visualized vessels there is no evidence for deep venous   thrombosis               CT CHEST W CONTRAST   Final Result Compression fractures of T8 and T12 with vertebroplasty at T12 and surgical   changes at the thoracolumbar junction. Nonspecific right adrenal mass and upper it did in the nail and a abdomen   lymphadenopathy. Atelectasis in the lung bases. Consider further assessment by PET-CT scan. CT abdomen and pelvis. The liver is decreased in attenuation likely liver disease or fatty   infiltration. The gallbladder is distended. Consider ultrasonography. Spleen, pancreas, and the left adrenal gland appear normal.  A previously   noted right adrenal mass measuring 2.3 x 3.4 cm is identified. Left kidney   is normal except for a 1.2 cm cystic lesion in the left kidney. There is a   large complex cystic and solid mass measuring 5.2 x 4.7 x 6.4 cm involving   the inferior pole of the right kidney. Additional complex hypodense lesions   are identified in the right kidney, the largest 1 measuring 2.8 x 2.6 cm. Multifocal malignancy is considered. Pyelonephritis with abscess is less   likely. Previously noted compression fracture of T12 is noted with   postoperative changes in the thoracolumbar junction. Pelvis. Bladder is distended. Prostate gland is prominent. Fat containing   right inguinal hernia is noted. There is constipation. The appendix is   normal.      Impression      Heterogeneous appearance of the right kidney with complex mass in the i   inferior pole concerning for necrotic malignancy. Additional hypodense   lesions are also identified. Differential consideration will also include   multifocal pyelonephritis with abscess and or malignancy. Postsurgical changes in the thoracolumbar junction with previous   vertebroplasty 8 T12. Further assessment by PET-CT scan may be considered.          CT ABDOMEN PELVIS W IV CONTRAST Additional Contrast? Radiologist Recommendation   Final Result   Compression fractures of T8 and T12 with vertebroplasty at T12 and surgical

## 2021-05-12 LAB
ALBUMIN SERPL-MCNC: 3.4 G/DL (ref 3.5–5.2)
ALP BLD-CCNC: 90 U/L (ref 40–129)
ALT SERPL-CCNC: 21 U/L (ref 0–40)
ANION GAP SERPL CALCULATED.3IONS-SCNC: 10 MMOL/L (ref 7–16)
AST SERPL-CCNC: 12 U/L (ref 0–39)
BASOPHILS ABSOLUTE: 0.11 E9/L (ref 0–0.2)
BASOPHILS RELATIVE PERCENT: 1 % (ref 0–2)
BILIRUB SERPL-MCNC: 0.3 MG/DL (ref 0–1.2)
BUN BLDV-MCNC: 18 MG/DL (ref 6–20)
CALCIUM SERPL-MCNC: 10.4 MG/DL (ref 8.6–10.2)
CHLORIDE BLD-SCNC: 94 MMOL/L (ref 98–107)
CO2: 30 MMOL/L (ref 22–29)
CREAT SERPL-MCNC: 0.7 MG/DL (ref 0.7–1.2)
EOSINOPHILS ABSOLUTE: 0.53 E9/L (ref 0.05–0.5)
EOSINOPHILS RELATIVE PERCENT: 4.9 % (ref 0–6)
GFR AFRICAN AMERICAN: >60
GFR NON-AFRICAN AMERICAN: >60 ML/MIN/1.73
GLUCOSE BLD-MCNC: 174 MG/DL (ref 74–99)
HCT VFR BLD CALC: 48.1 % (ref 37–54)
HEMOGLOBIN: 14.8 G/DL (ref 12.5–16.5)
IMMATURE GRANULOCYTES #: 0.05 E9/L
IMMATURE GRANULOCYTES %: 0.5 % (ref 0–5)
LYMPHOCYTES ABSOLUTE: 3.33 E9/L (ref 1.5–4)
LYMPHOCYTES RELATIVE PERCENT: 30.6 % (ref 20–42)
MCH RBC QN AUTO: 26.6 PG (ref 26–35)
MCHC RBC AUTO-ENTMCNC: 30.8 % (ref 32–34.5)
MCV RBC AUTO: 86.4 FL (ref 80–99.9)
METER GLUCOSE: 161 MG/DL (ref 74–99)
METER GLUCOSE: 173 MG/DL (ref 74–99)
METER GLUCOSE: 206 MG/DL (ref 74–99)
METER GLUCOSE: 228 MG/DL (ref 74–99)
MONOCYTES ABSOLUTE: 1.07 E9/L (ref 0.1–0.95)
MONOCYTES RELATIVE PERCENT: 9.8 % (ref 2–12)
NEUTROPHILS ABSOLUTE: 5.8 E9/L (ref 1.8–7.3)
NEUTROPHILS RELATIVE PERCENT: 53.2 % (ref 43–80)
PDW BLD-RTO: 13.2 FL (ref 11.5–15)
PLATELET # BLD: 406 E9/L (ref 130–450)
PMV BLD AUTO: 10.3 FL (ref 7–12)
POTASSIUM SERPL-SCNC: 4.7 MMOL/L (ref 3.5–5)
RBC # BLD: 5.57 E12/L (ref 3.8–5.8)
SODIUM BLD-SCNC: 134 MMOL/L (ref 132–146)
TOTAL PROTEIN: 6.9 G/DL (ref 6.4–8.3)
WBC # BLD: 10.9 E9/L (ref 4.5–11.5)

## 2021-05-12 PROCEDURE — 6370000000 HC RX 637 (ALT 250 FOR IP): Performed by: PHYSICIAN ASSISTANT

## 2021-05-12 PROCEDURE — 2580000003 HC RX 258: Performed by: PHYSICIAN ASSISTANT

## 2021-05-12 PROCEDURE — 80053 COMPREHEN METABOLIC PANEL: CPT

## 2021-05-12 PROCEDURE — 6370000000 HC RX 637 (ALT 250 FOR IP): Performed by: INTERNAL MEDICINE

## 2021-05-12 PROCEDURE — 85025 COMPLETE CBC W/AUTO DIFF WBC: CPT

## 2021-05-12 PROCEDURE — 6360000002 HC RX W HCPCS: Performed by: INTERNAL MEDICINE

## 2021-05-12 PROCEDURE — 2700000000 HC OXYGEN THERAPY PER DAY

## 2021-05-12 PROCEDURE — 36415 COLL VENOUS BLD VENIPUNCTURE: CPT

## 2021-05-12 PROCEDURE — 82962 GLUCOSE BLOOD TEST: CPT

## 2021-05-12 PROCEDURE — 1200000000 HC SEMI PRIVATE

## 2021-05-12 PROCEDURE — 6360000002 HC RX W HCPCS: Performed by: PHYSICIAN ASSISTANT

## 2021-05-12 RX ADMIN — LAMOTRIGINE 100 MG: 100 TABLET ORAL at 21:01

## 2021-05-12 RX ADMIN — ACETAMINOPHEN 1000 MG: 500 TABLET, FILM COATED ORAL at 21:01

## 2021-05-12 RX ADMIN — METFORMIN HYDROCHLORIDE 500 MG: 500 TABLET ORAL at 18:05

## 2021-05-12 RX ADMIN — INSULIN LISPRO 1 UNITS: 100 INJECTION, SOLUTION INTRAVENOUS; SUBCUTANEOUS at 18:05

## 2021-05-12 RX ADMIN — SENNOSIDES AND DOCUSATE SODIUM 1 TABLET: 8.6; 5 TABLET ORAL at 08:29

## 2021-05-12 RX ADMIN — Medication 10 ML: at 08:29

## 2021-05-12 RX ADMIN — ACETAMINOPHEN 1000 MG: 500 TABLET, FILM COATED ORAL at 14:52

## 2021-05-12 RX ADMIN — Medication 10 ML: at 22:00

## 2021-05-12 RX ADMIN — HYDROMORPHONE HYDROCHLORIDE 1 MG: 1 INJECTION, SOLUTION INTRAMUSCULAR; INTRAVENOUS; SUBCUTANEOUS at 05:15

## 2021-05-12 RX ADMIN — ACETAMINOPHEN 1000 MG: 500 TABLET, FILM COATED ORAL at 08:27

## 2021-05-12 RX ADMIN — INSULIN LISPRO 1 UNITS: 100 INJECTION, SOLUTION INTRAVENOUS; SUBCUTANEOUS at 08:00

## 2021-05-12 RX ADMIN — PREGABALIN 150 MG: 150 CAPSULE ORAL at 08:30

## 2021-05-12 RX ADMIN — HYDROMORPHONE HYDROCHLORIDE 1 MG: 1 INJECTION, SOLUTION INTRAMUSCULAR; INTRAVENOUS; SUBCUTANEOUS at 13:30

## 2021-05-12 RX ADMIN — DULOXETINE HYDROCHLORIDE 30 MG: 30 CAPSULE, DELAYED RELEASE ORAL at 08:27

## 2021-05-12 RX ADMIN — OXYCODONE HYDROCHLORIDE 40 MG: 20 TABLET, FILM COATED, EXTENDED RELEASE ORAL at 08:28

## 2021-05-12 RX ADMIN — HYDROMORPHONE HYDROCHLORIDE 1 MG: 1 INJECTION, SOLUTION INTRAMUSCULAR; INTRAVENOUS; SUBCUTANEOUS at 22:19

## 2021-05-12 RX ADMIN — METFORMIN HYDROCHLORIDE 500 MG: 500 TABLET ORAL at 08:28

## 2021-05-12 RX ADMIN — OXYCODONE HYDROCHLORIDE 40 MG: 20 TABLET, FILM COATED, EXTENDED RELEASE ORAL at 21:01

## 2021-05-12 RX ADMIN — PREGABALIN 150 MG: 150 CAPSULE ORAL at 21:00

## 2021-05-12 RX ADMIN — INSULIN LISPRO 1 UNITS: 100 INJECTION, SOLUTION INTRAVENOUS; SUBCUTANEOUS at 21:02

## 2021-05-12 RX ADMIN — LISINOPRIL 20 MG: 20 TABLET ORAL at 08:28

## 2021-05-12 RX ADMIN — LAMOTRIGINE 100 MG: 100 TABLET ORAL at 08:27

## 2021-05-12 RX ADMIN — OXYCODONE HYDROCHLORIDE 10 MG: 10 TABLET ORAL at 15:41

## 2021-05-12 RX ADMIN — OXYCODONE HYDROCHLORIDE 10 MG: 10 TABLET ORAL at 11:01

## 2021-05-12 RX ADMIN — SENNOSIDES AND DOCUSATE SODIUM 1 TABLET: 8.6; 5 TABLET ORAL at 21:01

## 2021-05-12 RX ADMIN — ENOXAPARIN SODIUM 40 MG: 40 INJECTION SUBCUTANEOUS at 08:27

## 2021-05-12 RX ADMIN — INSULIN LISPRO 2 UNITS: 100 INJECTION, SOLUTION INTRAVENOUS; SUBCUTANEOUS at 12:30

## 2021-05-12 ASSESSMENT — PAIN SCALES - GENERAL
PAINLEVEL_OUTOF10: 0
PAINLEVEL_OUTOF10: 0
PAINLEVEL_OUTOF10: 10
PAINLEVEL_OUTOF10: 7
PAINLEVEL_OUTOF10: 8
PAINLEVEL_OUTOF10: 0
PAINLEVEL_OUTOF10: 7
PAINLEVEL_OUTOF10: 8
PAINLEVEL_OUTOF10: 2
PAINLEVEL_OUTOF10: 7
PAINLEVEL_OUTOF10: 8
PAINLEVEL_OUTOF10: 6
PAINLEVEL_OUTOF10: 2
PAINLEVEL_OUTOF10: 8

## 2021-05-12 ASSESSMENT — PAIN DESCRIPTION - PROGRESSION
CLINICAL_PROGRESSION: NOT CHANGED
CLINICAL_PROGRESSION: NOT CHANGED

## 2021-05-12 ASSESSMENT — PAIN DESCRIPTION - ONSET
ONSET: ON-GOING
ONSET: ON-GOING

## 2021-05-12 ASSESSMENT — PAIN DESCRIPTION - ORIENTATION
ORIENTATION: LOWER
ORIENTATION: LOWER

## 2021-05-12 ASSESSMENT — PAIN DESCRIPTION - LOCATION
LOCATION: BACK
LOCATION: BACK

## 2021-05-12 ASSESSMENT — PAIN DESCRIPTION - FREQUENCY
FREQUENCY: CONTINUOUS
FREQUENCY: CONTINUOUS

## 2021-05-12 ASSESSMENT — PAIN DESCRIPTION - PAIN TYPE
TYPE: SURGICAL PAIN
TYPE: SURGICAL PAIN

## 2021-05-12 ASSESSMENT — PAIN DESCRIPTION - DESCRIPTORS
DESCRIPTORS: ACHING;CONSTANT;DISCOMFORT
DESCRIPTORS: ACHING;CONSTANT;DISCOMFORT

## 2021-05-12 NOTE — PROGRESS NOTES
Subjective:    Pain well controlled  No acute events overnight  Patient denies chest pain, shortness of breath, fever, and chills    Objective:    /86   Pulse 101   Temp 98.2 °F (36.8 °C) (Temporal)   Resp 18   Ht 6' (1.829 m)   Wt 283 lb (128.4 kg)   SpO2 92%   BMI 38.38 kg/m²     24HR INTAKE/OUTPUT:      Intake/Output Summary (Last 24 hours) at 5/12/2021 1207  Last data filed at 5/12/2021 0915  Gross per 24 hour   Intake 370 ml   Output 400 ml   Net -30 ml       General appearance: NAD, conversant, pleasant   Neck: FROM, supple   Lungs: Clear bilaterally no wheezes, no rhonchi, no crackles  CV: RRR, no MRGs; normal carotid upstroke and amplitude without Bruits  Abdomen: Soft, non-tender; no masses or HSM  Extremities: No edema, no cyanosis, no clubbing  Skin: Intact no rash, no lesions, no ulcers    Psych: Alert and oriented normal affect  Neuro: 5/5 strength bilateral lower extremities      Most Recent Labs  Lab Results   Component Value Date    WBC 10.9 05/12/2021    HGB 14.8 05/12/2021    HCT 48.1 05/12/2021     05/12/2021     05/12/2021    K 4.7 05/12/2021    CL 94 (L) 05/12/2021    CREATININE 0.7 05/12/2021    BUN 18 05/12/2021    CO2 30 (H) 05/12/2021    GLUCOSE 174 (H) 05/12/2021    ALT 21 05/12/2021    AST 12 05/12/2021    INR 1.1 04/30/2021    LABA1C 8.7 (H) 04/26/2021     No results for input(s): MG in the last 72 hours.   Lab Results   Component Value Date    CALCIUM 10.4 (H) 05/12/2021        NM BONE SCAN WHOLE BODY   Final Result   Findings compatible with degenerative changes    Increased tracer uptake within T4 T8 and T12 as described   Increased tracer uptake at the manubrium of the sternum on the left at   its junction with the clavicle possibly traumatic, please correlate   clinically      US DUP LOWER EXTREMITIES BILATERAL VENOUS   Final Result   Within the visualized vessels there is no evidence for deep venous   thrombosis               CT CHEST W CONTRAST   Final Result   Compression fractures of T8 and T12 with vertebroplasty at T12 and surgical   changes at the thoracolumbar junction. Nonspecific right adrenal mass and upper it did in the nail and a abdomen   lymphadenopathy. Atelectasis in the lung bases. Consider further assessment by PET-CT scan. CT abdomen and pelvis. The liver is decreased in attenuation likely liver disease or fatty   infiltration. The gallbladder is distended. Consider ultrasonography. Spleen, pancreas, and the left adrenal gland appear normal.  A previously   noted right adrenal mass measuring 2.3 x 3.4 cm is identified. Left kidney   is normal except for a 1.2 cm cystic lesion in the left kidney. There is a   large complex cystic and solid mass measuring 5.2 x 4.7 x 6.4 cm involving   the inferior pole of the right kidney. Additional complex hypodense lesions   are identified in the right kidney, the largest 1 measuring 2.8 x 2.6 cm. Multifocal malignancy is considered. Pyelonephritis with abscess is less   likely. Previously noted compression fracture of T12 is noted with   postoperative changes in the thoracolumbar junction. Pelvis. Bladder is distended. Prostate gland is prominent. Fat containing   right inguinal hernia is noted. There is constipation. The appendix is   normal.      Impression      Heterogeneous appearance of the right kidney with complex mass in the i   inferior pole concerning for necrotic malignancy. Additional hypodense   lesions are also identified. Differential consideration will also include   multifocal pyelonephritis with abscess and or malignancy. Postsurgical changes in the thoracolumbar junction with previous   vertebroplasty 8 T12. Further assessment by PET-CT scan may be considered.          CT ABDOMEN PELVIS W IV CONTRAST Additional Contrast? Radiologist Recommendation   Final Result   Compression fractures of T8 and T12 with vertebroplasty at T12 and surgical changes at the thoracolumbar junction. Nonspecific right adrenal mass and upper it did in the nail and a abdomen   lymphadenopathy. Atelectasis in the lung bases. Consider further assessment by PET-CT scan. CT abdomen and pelvis. The liver is decreased in attenuation likely liver disease or fatty   infiltration. The gallbladder is distended. Consider ultrasonography. Spleen, pancreas, and the left adrenal gland appear normal.  A previously   noted right adrenal mass measuring 2.3 x 3.4 cm is identified. Left kidney   is normal except for a 1.2 cm cystic lesion in the left kidney. There is a   large complex cystic and solid mass measuring 5.2 x 4.7 x 6.4 cm involving   the inferior pole of the right kidney. Additional complex hypodense lesions   are identified in the right kidney, the largest 1 measuring 2.8 x 2.6 cm. Multifocal malignancy is considered. Pyelonephritis with abscess is less   likely. Previously noted compression fracture of T12 is noted with   postoperative changes in the thoracolumbar junction. Pelvis. Bladder is distended. Prostate gland is prominent. Fat containing   right inguinal hernia is noted. There is constipation. The appendix is   normal.      Impression      Heterogeneous appearance of the right kidney with complex mass in the i   inferior pole concerning for necrotic malignancy. Additional hypodense   lesions are also identified. Differential consideration will also include   multifocal pyelonephritis with abscess and or malignancy. Postsurgical changes in the thoracolumbar junction with previous   vertebroplasty 8 T12. Further assessment by PET-CT scan may be considered. FLUORO FOR SURGICAL PROCEDURES   Final Result   Intraprocedural fluoroscopic spot images as above. See separate procedure   report for more information. XR CHEST PORTABLE   Final Result   No acute process.          MRI LUMBAR SPINE WO CONTRAST abuse    Adrenal mass (HCC)    Burst fracture of twelfth thoracic vertebra (HCC)    Spinal cord compression (HCC)    Acute thoracic back pain    Pathologic fracture    Spinal cord tumor  Resolved Problems:    * No resolved hospital problems. 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 T12 kyphoplasty and T12 laminectomy for tumor decompression on 4/30    Path is back showing right sided high grade sarcomatoid renal call carcinoma. R adrenal mass of unclear significance     Per heme-onc, this disease is aggressive and carries a poor prognosis. He needs timely consideration of debulking nephrectomy and follow-up radiation/chemo at a quaternary referral center that has some experience dealing with this.     Diabetes-Metformin, MBS/SSI    Hypertension-lisinopril-monitor    Hyponatremia improved     Tobacco cessation      Pain management consult appreciated Lyrica, duloxetine, OxyContin, OxyIR, Dilaudid IV, acetaminophen, Valium continue current regimen -pain is better controlled today.     Disposition-awaiting bed at [facility] - Still no bed available - patient updated     DVT prophylaxis  PT OT  Discharge planning  Case discussed with attending and agreed upon plan of care. Castro, KARLA - CNP    5/12/2021    12:07 PM     Change percocet to oxy ir for better control of breakthrough pain   Await bed [facility]     I personally saw, examined and provided care for the patient. Radiographs, labs and medication list were reviewed by me independently. The case was discussed in detail and plans for care were established. Review of [address] [place] [address], documentation was conducted and revisions were made as appropriate directly by me. I agree with the above documented exam, problem list, and plan of care.      Iliana Pink MD  3:76 PM  5/12/2021

## 2021-05-12 NOTE — PROGRESS NOTES
Occupational Therapy  OT BEDSIDE TREATMENT NOTE      Date:2021  Patient Name: Sonia Gutierrez  MRN: [id number]  : 1965  Room: [address] [place] [address]       Pt's chart reviewed and treatment attempted, pt declined this date due to pain. Educated pt on importance of OOB activity and participation in therapy. Will attempt at a later time/date.             Lorraine Hummel

## 2021-05-12 NOTE — PROGRESS NOTES
Checked with SOCRATES Paredes of access center, [facility] does not have any beds available yet.

## 2021-05-12 NOTE — CARE COORDINATION
5/12/2021 social work transition of care planning  Pt plan is to [facility],once bed available.    Electronically signed by AIYANA Cunningham on 5/12/2021 at 7:45 AM

## 2021-05-13 LAB
ANION GAP SERPL CALCULATED.3IONS-SCNC: 11 MMOL/L (ref 7–16)
BASOPHILS ABSOLUTE: 0.08 E9/L (ref 0–0.2)
BASOPHILS RELATIVE PERCENT: 0.8 % (ref 0–2)
BUN BLDV-MCNC: 19 MG/DL (ref 6–20)
CALCIUM SERPL-MCNC: 10 MG/DL (ref 8.6–10.2)
CHLORIDE BLD-SCNC: 94 MMOL/L (ref 98–107)
CO2: 31 MMOL/L (ref 22–29)
CREAT SERPL-MCNC: 0.7 MG/DL (ref 0.7–1.2)
EOSINOPHILS ABSOLUTE: 0.45 E9/L (ref 0.05–0.5)
EOSINOPHILS RELATIVE PERCENT: 4.5 % (ref 0–6)
GFR AFRICAN AMERICAN: >60
GFR NON-AFRICAN AMERICAN: >60 ML/MIN/1.73
GLUCOSE BLD-MCNC: 210 MG/DL (ref 74–99)
HCT VFR BLD CALC: 46.9 % (ref 37–54)
HEMOGLOBIN: 14.4 G/DL (ref 12.5–16.5)
IMMATURE GRANULOCYTES #: 0.03 E9/L
IMMATURE GRANULOCYTES %: 0.3 % (ref 0–5)
LYMPHOCYTES ABSOLUTE: 2.6 E9/L (ref 1.5–4)
LYMPHOCYTES RELATIVE PERCENT: 26.2 % (ref 20–42)
MCH RBC QN AUTO: 27.1 PG (ref 26–35)
MCHC RBC AUTO-ENTMCNC: 30.7 % (ref 32–34.5)
MCV RBC AUTO: 88.2 FL (ref 80–99.9)
METER GLUCOSE: 133 MG/DL (ref 74–99)
METER GLUCOSE: 181 MG/DL (ref 74–99)
METER GLUCOSE: 210 MG/DL (ref 74–99)
METER GLUCOSE: 234 MG/DL (ref 74–99)
MONOCYTES ABSOLUTE: 0.97 E9/L (ref 0.1–0.95)
MONOCYTES RELATIVE PERCENT: 9.8 % (ref 2–12)
NEUTROPHILS ABSOLUTE: 5.81 E9/L (ref 1.8–7.3)
NEUTROPHILS RELATIVE PERCENT: 58.4 % (ref 43–80)
PDW BLD-RTO: 13.1 FL (ref 11.5–15)
PLATELET # BLD: 380 E9/L (ref 130–450)
PMV BLD AUTO: 10.4 FL (ref 7–12)
POTASSIUM SERPL-SCNC: 4.5 MMOL/L (ref 3.5–5)
RBC # BLD: 5.32 E12/L (ref 3.8–5.8)
SODIUM BLD-SCNC: 136 MMOL/L (ref 132–146)
WBC # BLD: 9.9 E9/L (ref 4.5–11.5)

## 2021-05-13 PROCEDURE — 6370000000 HC RX 637 (ALT 250 FOR IP): Performed by: PHYSICIAN ASSISTANT

## 2021-05-13 PROCEDURE — 80048 BASIC METABOLIC PNL TOTAL CA: CPT

## 2021-05-13 PROCEDURE — 6370000000 HC RX 637 (ALT 250 FOR IP): Performed by: INTERNAL MEDICINE

## 2021-05-13 PROCEDURE — 6360000002 HC RX W HCPCS: Performed by: INTERNAL MEDICINE

## 2021-05-13 PROCEDURE — 82962 GLUCOSE BLOOD TEST: CPT

## 2021-05-13 PROCEDURE — 36415 COLL VENOUS BLD VENIPUNCTURE: CPT

## 2021-05-13 PROCEDURE — 6360000002 HC RX W HCPCS: Performed by: PHYSICIAN ASSISTANT

## 2021-05-13 PROCEDURE — 1200000000 HC SEMI PRIVATE

## 2021-05-13 PROCEDURE — 85025 COMPLETE CBC W/AUTO DIFF WBC: CPT

## 2021-05-13 PROCEDURE — 2580000003 HC RX 258: Performed by: PHYSICIAN ASSISTANT

## 2021-05-13 RX ADMIN — HYDROMORPHONE HYDROCHLORIDE 1 MG: 1 INJECTION, SOLUTION INTRAMUSCULAR; INTRAVENOUS; SUBCUTANEOUS at 15:23

## 2021-05-13 RX ADMIN — Medication 10 ML: at 20:44

## 2021-05-13 RX ADMIN — Medication 10 ML: at 15:23

## 2021-05-13 RX ADMIN — PREGABALIN 150 MG: 150 CAPSULE ORAL at 20:43

## 2021-05-13 RX ADMIN — INSULIN LISPRO 2 UNITS: 100 INJECTION, SOLUTION INTRAVENOUS; SUBCUTANEOUS at 12:03

## 2021-05-13 RX ADMIN — ENOXAPARIN SODIUM 40 MG: 40 INJECTION SUBCUTANEOUS at 08:22

## 2021-05-13 RX ADMIN — LISINOPRIL 20 MG: 20 TABLET ORAL at 08:24

## 2021-05-13 RX ADMIN — SENNOSIDES AND DOCUSATE SODIUM 1 TABLET: 8.6; 5 TABLET ORAL at 20:43

## 2021-05-13 RX ADMIN — OXYCODONE HYDROCHLORIDE 10 MG: 10 TABLET ORAL at 18:32

## 2021-05-13 RX ADMIN — ACETAMINOPHEN 1000 MG: 500 TABLET, FILM COATED ORAL at 08:24

## 2021-05-13 RX ADMIN — Medication 10 ML: at 08:35

## 2021-05-13 RX ADMIN — OXYCODONE HYDROCHLORIDE 40 MG: 20 TABLET, FILM COATED, EXTENDED RELEASE ORAL at 08:29

## 2021-05-13 RX ADMIN — OXYCODONE HYDROCHLORIDE 10 MG: 10 TABLET ORAL at 14:39

## 2021-05-13 RX ADMIN — INSULIN LISPRO 2 UNITS: 100 INJECTION, SOLUTION INTRAVENOUS; SUBCUTANEOUS at 08:24

## 2021-05-13 RX ADMIN — HYDROMORPHONE HYDROCHLORIDE 1 MG: 1 INJECTION, SOLUTION INTRAMUSCULAR; INTRAVENOUS; SUBCUTANEOUS at 23:34

## 2021-05-13 RX ADMIN — DULOXETINE HYDROCHLORIDE 30 MG: 30 CAPSULE, DELAYED RELEASE ORAL at 08:24

## 2021-05-13 RX ADMIN — ACETAMINOPHEN 1000 MG: 500 TABLET, FILM COATED ORAL at 14:30

## 2021-05-13 RX ADMIN — Medication 10 ML: at 23:34

## 2021-05-13 RX ADMIN — POLYETHYLENE GLYCOL 3350 17 G: 17 POWDER, FOR SOLUTION ORAL at 20:43

## 2021-05-13 RX ADMIN — INSULIN LISPRO 1 UNITS: 100 INJECTION, SOLUTION INTRAVENOUS; SUBCUTANEOUS at 21:02

## 2021-05-13 RX ADMIN — LAMOTRIGINE 100 MG: 100 TABLET ORAL at 08:24

## 2021-05-13 RX ADMIN — OXYCODONE HYDROCHLORIDE 10 MG: 10 TABLET ORAL at 00:15

## 2021-05-13 RX ADMIN — HYDROMORPHONE HYDROCHLORIDE 1 MG: 1 INJECTION, SOLUTION INTRAMUSCULAR; INTRAVENOUS; SUBCUTANEOUS at 07:15

## 2021-05-13 RX ADMIN — METFORMIN HYDROCHLORIDE 500 MG: 500 TABLET ORAL at 08:24

## 2021-05-13 RX ADMIN — ACETAMINOPHEN 1000 MG: 500 TABLET, FILM COATED ORAL at 20:43

## 2021-05-13 RX ADMIN — PREGABALIN 150 MG: 150 CAPSULE ORAL at 08:35

## 2021-05-13 RX ADMIN — OXYCODONE HYDROCHLORIDE 10 MG: 10 TABLET ORAL at 05:55

## 2021-05-13 RX ADMIN — METFORMIN HYDROCHLORIDE 500 MG: 500 TABLET ORAL at 17:14

## 2021-05-13 RX ADMIN — OXYCODONE HYDROCHLORIDE 40 MG: 20 TABLET, FILM COATED, EXTENDED RELEASE ORAL at 20:03

## 2021-05-13 RX ADMIN — OXYCODONE HYDROCHLORIDE 10 MG: 10 TABLET ORAL at 10:50

## 2021-05-13 RX ADMIN — SENNOSIDES AND DOCUSATE SODIUM 1 TABLET: 8.6; 5 TABLET ORAL at 08:23

## 2021-05-13 RX ADMIN — LAMOTRIGINE 100 MG: 100 TABLET ORAL at 20:43

## 2021-05-13 RX ADMIN — OXYCODONE HYDROCHLORIDE 10 MG: 10 TABLET ORAL at 22:33

## 2021-05-13 ASSESSMENT — PAIN DESCRIPTION - LOCATION
LOCATION: BACK;LEG
LOCATION: BACK;LEG
LOCATION: BACK
LOCATION: BACK;LEG

## 2021-05-13 ASSESSMENT — PAIN - FUNCTIONAL ASSESSMENT
PAIN_FUNCTIONAL_ASSESSMENT: PREVENTS OR INTERFERES WITH ALL ACTIVE AND SOME PASSIVE ACTIVITIES
PAIN_FUNCTIONAL_ASSESSMENT: PREVENTS OR INTERFERES SOME ACTIVE ACTIVITIES AND ADLS
PAIN_FUNCTIONAL_ASSESSMENT: PREVENTS OR INTERFERES WITH ALL ACTIVE AND SOME PASSIVE ACTIVITIES
PAIN_FUNCTIONAL_ASSESSMENT: PREVENTS OR INTERFERES SOME ACTIVE ACTIVITIES AND ADLS
PAIN_FUNCTIONAL_ASSESSMENT: PREVENTS OR INTERFERES WITH ALL ACTIVE AND SOME PASSIVE ACTIVITIES
PAIN_FUNCTIONAL_ASSESSMENT: PREVENTS OR INTERFERES SOME ACTIVE ACTIVITIES AND ADLS
PAIN_FUNCTIONAL_ASSESSMENT: PREVENTS OR INTERFERES WITH ALL ACTIVE AND SOME PASSIVE ACTIVITIES

## 2021-05-13 ASSESSMENT — PAIN SCALES - GENERAL
PAINLEVEL_OUTOF10: 7
PAINLEVEL_OUTOF10: 10
PAINLEVEL_OUTOF10: 8
PAINLEVEL_OUTOF10: 6
PAINLEVEL_OUTOF10: 2
PAINLEVEL_OUTOF10: 7
PAINLEVEL_OUTOF10: 8
PAINLEVEL_OUTOF10: 7
PAINLEVEL_OUTOF10: 7
PAINLEVEL_OUTOF10: 0
PAINLEVEL_OUTOF10: 7
PAINLEVEL_OUTOF10: 10
PAINLEVEL_OUTOF10: 0
PAINLEVEL_OUTOF10: 4
PAINLEVEL_OUTOF10: 7
PAINLEVEL_OUTOF10: 8
PAINLEVEL_OUTOF10: 10
PAINLEVEL_OUTOF10: 8

## 2021-05-13 ASSESSMENT — PAIN DESCRIPTION - DESCRIPTORS
DESCRIPTORS: BURNING;CONSTANT;THROBBING
DESCRIPTORS: BURNING;ACHING;DISCOMFORT;CONSTANT
DESCRIPTORS: ACHING;CONSTANT;THROBBING
DESCRIPTORS: ACHING;CONSTANT;THROBBING
DESCRIPTORS: ACHING;BURNING;DISCOMFORT;THROBBING
DESCRIPTORS: ACHING;BURNING;CONSTANT
DESCRIPTORS: ACHING;BURNING;THROBBING

## 2021-05-13 ASSESSMENT — PAIN DESCRIPTION - PROGRESSION
CLINICAL_PROGRESSION: NOT CHANGED
CLINICAL_PROGRESSION: GRADUALLY WORSENING
CLINICAL_PROGRESSION: NOT CHANGED

## 2021-05-13 ASSESSMENT — PAIN DESCRIPTION - ONSET
ONSET: ON-GOING
ONSET: GRADUAL
ONSET: ON-GOING
ONSET: GRADUAL

## 2021-05-13 ASSESSMENT — PAIN DESCRIPTION - FREQUENCY
FREQUENCY: CONTINUOUS

## 2021-05-13 ASSESSMENT — PAIN DESCRIPTION - ORIENTATION
ORIENTATION: LOWER
ORIENTATION: LOWER

## 2021-05-13 ASSESSMENT — PAIN DESCRIPTION - PAIN TYPE
TYPE: ACUTE PAIN;SURGICAL PAIN

## 2021-05-13 NOTE — PROGRESS NOTES
Subjective:    Patient sleeping on my exam  Patient does not appear to be in any acute distress  No acute events overnight    Objective:    /70   Pulse 100   Temp 97 °F (36.1 °C) (Temporal)   Resp 18   Ht 6' (1.829 m)   Wt 283 lb (128.4 kg)   SpO2 93%   BMI 38.38 kg/m²     24HR INTAKE/OUTPUT:      Intake/Output Summary (Last 24 hours) at 5/13/2021 1138  Last data filed at 5/13/2021 0656  Gross per 24 hour   Intake 490 ml   Output 200 ml   Net 290 ml       General appearance: NAD, conversant, pleasant   Neck: FROM, supple   Lungs: Clear bilaterally no wheezes, no rhonchi, no crackles  CV: RRR, no MRGs; normal carotid upstroke and amplitude without Bruits  Abdomen: Soft, non-tender; no masses or HSM  Extremities: No edema, no cyanosis, no clubbing  Skin: Intact no rash, no lesions, no ulcers    Psych: Alert and oriented normal affect  Neuro: 5/5 strength bilateral lower extremities      Most Recent Labs  Lab Results   Component Value Date    WBC 9.9 05/13/2021    HGB 14.4 05/13/2021    HCT 46.9 05/13/2021     05/13/2021     05/13/2021    K 4.5 05/13/2021    CL 94 (L) 05/13/2021    CREATININE 0.7 05/13/2021    BUN 19 05/13/2021    CO2 31 (H) 05/13/2021    GLUCOSE 210 (H) 05/13/2021    ALT 21 05/12/2021    AST 12 05/12/2021    INR 1.1 04/30/2021    LABA1C 8.7 (H) 04/26/2021     No results for input(s): MG in the last 72 hours.   Lab Results   Component Value Date    CALCIUM 10.0 05/13/2021        NM BONE SCAN WHOLE BODY   Final Result   Findings compatible with degenerative changes    Increased tracer uptake within T4 T8 and T12 as described   Increased tracer uptake at the manubrium of the sternum on the left at   its junction with the clavicle possibly traumatic, please correlate   clinically      US DUP LOWER EXTREMITIES BILATERAL VENOUS   Final Result   Within the visualized vessels there is no evidence for deep venous   thrombosis               CT CHEST W CONTRAST   Final Result Compression fractures of T8 and T12 with vertebroplasty at T12 and surgical   changes at the thoracolumbar junction. Nonspecific right adrenal mass and upper it did in the nail and a abdomen   lymphadenopathy. Atelectasis in the lung bases. Consider further assessment by PET-CT scan. CT abdomen and pelvis. The liver is decreased in attenuation likely liver disease or fatty   infiltration. The gallbladder is distended. Consider ultrasonography. Spleen, pancreas, and the left adrenal gland appear normal.  A previously   noted right adrenal mass measuring 2.3 x 3.4 cm is identified. Left kidney   is normal except for a 1.2 cm cystic lesion in the left kidney. There is a   large complex cystic and solid mass measuring 5.2 x 4.7 x 6.4 cm involving   the inferior pole of the right kidney. Additional complex hypodense lesions   are identified in the right kidney, the largest 1 measuring 2.8 x 2.6 cm. Multifocal malignancy is considered. Pyelonephritis with abscess is less   likely. Previously noted compression fracture of T12 is noted with   postoperative changes in the thoracolumbar junction. Pelvis. Bladder is distended. Prostate gland is prominent. Fat containing   right inguinal hernia is noted. There is constipation. The appendix is   normal.      Impression      Heterogeneous appearance of the right kidney with complex mass in the i   inferior pole concerning for necrotic malignancy. Additional hypodense   lesions are also identified. Differential consideration will also include   multifocal pyelonephritis with abscess and or malignancy. Postsurgical changes in the thoracolumbar junction with previous   vertebroplasty 8 T12. Further assessment by PET-CT scan may be considered.          CT ABDOMEN PELVIS W IV CONTRAST Additional Contrast? Radiologist Recommendation   Final Result   Compression fractures of T8 and T12 with vertebroplasty at T12 and surgical changes at the thoracolumbar junction. Nonspecific right adrenal mass and upper it did in the nail and a abdomen   lymphadenopathy. Atelectasis in the lung bases. Consider further assessment by PET-CT scan. CT abdomen and pelvis. The liver is decreased in attenuation likely liver disease or fatty   infiltration. The gallbladder is distended. Consider ultrasonography. Spleen, pancreas, and the left adrenal gland appear normal.  A previously   noted right adrenal mass measuring 2.3 x 3.4 cm is identified. Left kidney   is normal except for a 1.2 cm cystic lesion in the left kidney. There is a   large complex cystic and solid mass measuring 5.2 x 4.7 x 6.4 cm involving   the inferior pole of the right kidney. Additional complex hypodense lesions   are identified in the right kidney, the largest 1 measuring 2.8 x 2.6 cm. Multifocal malignancy is considered. Pyelonephritis with abscess is less   likely. Previously noted compression fracture of T12 is noted with   postoperative changes in the thoracolumbar junction. Pelvis. Bladder is distended. Prostate gland is prominent. Fat containing   right inguinal hernia is noted. There is constipation. The appendix is   normal.      Impression      Heterogeneous appearance of the right kidney with complex mass in the i   inferior pole concerning for necrotic malignancy. Additional hypodense   lesions are also identified. Differential consideration will also include   multifocal pyelonephritis with abscess and or malignancy. Postsurgical changes in the thoracolumbar junction with previous   vertebroplasty 8 T12. Further assessment by PET-CT scan may be considered. FLUORO FOR SURGICAL PROCEDURES   Final Result   Intraprocedural fluoroscopic spot images as above. See separate procedure   report for more information. XR CHEST PORTABLE   Final Result   No acute process.          MRI LUMBAR SPINE WO CONTRAST Final Result   Acute to subacute superior endplate compression fracture of T12 with 40%   height loss and 5 mm retropulsion into the spinal canal.  There is associated   extensive anterior epidural hematoma at this level which contributes to   severe canal stenosis with cord compression at the level of T12 and moderate   bilateral neural foraminal narrowing at T11-T12 and T12-L1. No evidence of spondylo discitis. Chronic superior endplate compression fracture of T8 with 25% height loss. The findings were sent to the Radiology Results [id number] at 7:56   pm on 4/28/2021to be communicated to a licensed caregiver. MRI THORACIC SPINE WO CONTRAST   Final Result   Acute to subacute superior endplate compression fracture of T12 with 40%   height loss and 5 mm retropulsion into the spinal canal.  There is associated   extensive anterior epidural hematoma at this level which contributes to   severe canal stenosis with cord compression at the level of T12 and moderate   bilateral neural foraminal narrowing at T11-T12 and T12-L1. No evidence of spondylo discitis. Chronic superior endplate compression fracture of T8 with 25% height loss. The findings were sent to the Radiology Results [id number] at 7:56   pm on 4/28/2021to be communicated to a licensed caregiver. CT THORACIC SPINE W CONTRAST   Final Result   1. Subacute T12 compression fracture demonstrating 30% loss of height. 2. Mild spinal canal stenosis at T11-T12 secondary to 4 mm of retropulsion of   the posterior margin of the T12 vertebral body into the spinal canal.   3. No findings to suggest discitis/osteomyelitis. 4. Indeterminate 2.2 cm right adrenal nodule for which an MRI with contrast   is recommended for further characterization.              Assessment    Principal Problem:    Sarcomatoid renal cell carcinoma (HCC)  Active Problems:    Hyponatremia    Diabetes mellitus (HCC)    Tobacco abuse    Adrenal mass (HCC)    Burst fracture of twelfth thoracic vertebra (HCC)    Spinal cord compression (HCC)    Acute thoracic back pain    Pathologic fracture    Spinal cord tumor  Resolved Problems:    * No resolved hospital problems. 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 T12 kyphoplasty and T12 laminectomy for tumor decompression on 4/30    Path is back showing right sided high grade sarcomatoid renal call carcinoma. R adrenal mass of unclear significance     Per heme-onc, this disease is aggressive and carries a poor prognosis. He needs timely consideration of debulking nephrectomy and follow-up radiation/chemo at a quaternary referral center that has some experience dealing with this.     Diabetes-Metformin, MBS/SSI    Hypertension-lisinopril-monitor    Hyponatremia improved     Tobacco cessation      Pain management consult appreciated Lyrica, duloxetine, OxyContin, OxyIR, Dilaudid IV, acetaminophen, Valium continue current regimen -    OxyIR was increased     Disposition-awaiting bed at [facility] - Still no bed available - patient updated     DVT prophylaxis  PT OT  Discharge planning  Case discussed with attending and agreed upon plan of care. [place], APRN - CNP    5/13/2021    11:38 AM   Change percocet to oxy ir for better control of breakthrough pain - iproved pan control   Labs reviewed today - no acute findings   Await bed [facility]   continue iss     I personally saw, examined and provided care for the patient. Radiographs, labs and medication list were reviewed by me independently. The case was discussed in detail and plans for care were established. Review of [address] [place] [address], documentation was conducted and revisions were made as appropriate directly by me.  I agree with the above documented exam, problem list, and plan of care.      Qing Carvajal MD  12:23 PM  5/13/2021

## 2021-05-13 NOTE — CARE COORDINATION
5/13/2021 social work transition of care planning  Plan is to [facility] to [facility], awaiting bed availability.   Electronically signed by AIYANA Herbert on 5/13/2021 at 8:23 AM

## 2021-05-14 VITALS
SYSTOLIC BLOOD PRESSURE: 130 MMHG | RESPIRATION RATE: 18 BRPM | WEIGHT: 283 LBS | HEIGHT: 72 IN | BODY MASS INDEX: 38.33 KG/M2 | HEART RATE: 97 BPM | DIASTOLIC BLOOD PRESSURE: 69 MMHG | OXYGEN SATURATION: 97 % | TEMPERATURE: 97.8 F

## 2021-05-14 LAB
METER GLUCOSE: 177 MG/DL (ref 74–99)
SARS-COV-2, NAAT: NOT DETECTED

## 2021-05-14 PROCEDURE — 6370000000 HC RX 637 (ALT 250 FOR IP): Performed by: INTERNAL MEDICINE

## 2021-05-14 PROCEDURE — 87635 SARS-COV-2 COVID-19 AMP PRB: CPT

## 2021-05-14 PROCEDURE — 6370000000 HC RX 637 (ALT 250 FOR IP): Performed by: PHYSICIAN ASSISTANT

## 2021-05-14 PROCEDURE — 2580000003 HC RX 258: Performed by: PHYSICIAN ASSISTANT

## 2021-05-14 PROCEDURE — 6360000002 HC RX W HCPCS: Performed by: INTERNAL MEDICINE

## 2021-05-14 PROCEDURE — 82962 GLUCOSE BLOOD TEST: CPT

## 2021-05-14 PROCEDURE — 6360000002 HC RX W HCPCS: Performed by: PHYSICIAN ASSISTANT

## 2021-05-14 RX ADMIN — HYDROMORPHONE HYDROCHLORIDE 1 MG: 1 INJECTION, SOLUTION INTRAMUSCULAR; INTRAVENOUS; SUBCUTANEOUS at 08:08

## 2021-05-14 RX ADMIN — OXYCODONE HYDROCHLORIDE 10 MG: 10 TABLET ORAL at 02:33

## 2021-05-14 RX ADMIN — ACETAMINOPHEN 1000 MG: 500 TABLET, FILM COATED ORAL at 08:11

## 2021-05-14 RX ADMIN — LAMOTRIGINE 100 MG: 100 TABLET ORAL at 08:10

## 2021-05-14 RX ADMIN — LISINOPRIL 20 MG: 20 TABLET ORAL at 08:11

## 2021-05-14 RX ADMIN — METFORMIN HYDROCHLORIDE 500 MG: 500 TABLET ORAL at 08:10

## 2021-05-14 RX ADMIN — PREGABALIN 150 MG: 150 CAPSULE ORAL at 08:11

## 2021-05-14 RX ADMIN — Medication 10 ML: at 08:09

## 2021-05-14 RX ADMIN — INSULIN LISPRO 1 UNITS: 100 INJECTION, SOLUTION INTRAVENOUS; SUBCUTANEOUS at 08:10

## 2021-05-14 RX ADMIN — OXYCODONE HYDROCHLORIDE 10 MG: 10 TABLET ORAL at 10:30

## 2021-05-14 RX ADMIN — DULOXETINE HYDROCHLORIDE 30 MG: 30 CAPSULE, DELAYED RELEASE ORAL at 08:10

## 2021-05-14 RX ADMIN — Medication 10 ML: at 09:00

## 2021-05-14 RX ADMIN — OXYCODONE HYDROCHLORIDE 10 MG: 10 TABLET ORAL at 06:53

## 2021-05-14 RX ADMIN — SENNOSIDES AND DOCUSATE SODIUM 1 TABLET: 8.6; 5 TABLET ORAL at 08:10

## 2021-05-14 RX ADMIN — OXYCODONE HYDROCHLORIDE 40 MG: 20 TABLET, FILM COATED, EXTENDED RELEASE ORAL at 08:09

## 2021-05-14 RX ADMIN — ENOXAPARIN SODIUM 40 MG: 40 INJECTION SUBCUTANEOUS at 08:08

## 2021-05-14 ASSESSMENT — PAIN SCALES - GENERAL
PAINLEVEL_OUTOF10: 8
PAINLEVEL_OUTOF10: 8
PAINLEVEL_OUTOF10: 7
PAINLEVEL_OUTOF10: 5
PAINLEVEL_OUTOF10: 0
PAINLEVEL_OUTOF10: 9

## 2021-05-14 ASSESSMENT — PAIN - FUNCTIONAL ASSESSMENT: PAIN_FUNCTIONAL_ASSESSMENT: PREVENTS OR INTERFERES SOME ACTIVE ACTIVITIES AND ADLS

## 2021-05-14 ASSESSMENT — PAIN DESCRIPTION - ONSET
ONSET: GRADUAL
ONSET: GRADUAL

## 2021-05-14 ASSESSMENT — PAIN DESCRIPTION - PAIN TYPE
TYPE: SURGICAL PAIN
TYPE: CHRONIC PAIN;SURGICAL PAIN

## 2021-05-14 ASSESSMENT — PAIN DESCRIPTION - FREQUENCY
FREQUENCY: CONTINUOUS
FREQUENCY: CONTINUOUS

## 2021-05-14 ASSESSMENT — PAIN DESCRIPTION - ORIENTATION: ORIENTATION: LOWER

## 2021-05-14 ASSESSMENT — PAIN DESCRIPTION - PROGRESSION
CLINICAL_PROGRESSION: NOT CHANGED
CLINICAL_PROGRESSION: NOT CHANGED

## 2021-05-14 ASSESSMENT — PAIN DESCRIPTION - DIRECTION: RADIATING_TOWARDS: BLE

## 2021-05-14 ASSESSMENT — PAIN DESCRIPTION - DESCRIPTORS
DESCRIPTORS: ACHING;DISCOMFORT;SORE
DESCRIPTORS: ACHING;DISCOMFORT;SORE

## 2021-05-14 ASSESSMENT — PAIN DESCRIPTION - LOCATION
LOCATION: BACK
LOCATION: BACK

## 2021-05-14 NOTE — CARE COORDINATION
5/14/2021 social work transition of care planning  Pt to [facility] Jessica Wells notes pt to be transported around [place] [id number] today.   Electronically signed by AIYANA Solares on 5/14/2021 at 7:57 AM

## 2021-05-14 NOTE — DISCHARGE SUMMARY
Physician Discharge Summary     Patient ID:  Joselito Calderon  [id number]  48 y.o.  1965    Admit date: 2021    Discharge date and time: 2021    Admission Diagnoses:   Patient Active Problem List   Diagnosis    Arthritis of wrist    Scapho-lunate dissociation    Work related injury    Hyponatremia    Diabetes mellitus ([address]    Tobacco abuse    Adrenal mass ([address]    Burst fracture of twelfth thoracic vertebra ([address]    Spinal cord compression ([address]    Acute thoracic back pain    Pathologic fracture    Spinal cord tumor    [facility]comatoid [facility]       Discharge Diagnoses: Sarcomatoid renal cell carcinoma    Consults: Radiation oncology, PMR, Urology, Oncology    Procedures: T12 laminectomy for resection of ventral epidural mass. 2021    Hospital Course: The patient is a 48 y.o. male of Tierney Lopez MD with significant past medical history of hypertension, DM, who presents with mid back pain radiating into both proximal thighs with a burning-lilke quality. Patient underwent T 12 laminectomy for tumor decompression on . Pathology revealed high grade sarcomatoid renal cell carcinoma. Disease is aggressive and has a poor prognosis. Patient agreed for aggressive treatment at [facility]. Patient was transferred on 2021.     Recent Labs     21  0511 21  0508   WBC 10.9 9.9   HGB 14.8 14.4   HCT 48.1 46.9    380       Recent Labs     21  0511 21  0508    136   K 4.7 4.5   CL 94* 94*   CO2 30* 31*   BUN 18 19   CREATININE 0.7 0.7   CALCIUM 10.4* 10.0       Nm Bone Scan Whole Body    Result Date: 2021  Patient MRN: [id number] : 1965 Age:  48 years Gender: Male Order Date: 2021 8:01 AM Exam: NM BONE SCAN WHOLE BODY Number of Images: 2 views Indication:   metastatic disease metastatic disease What reading provider will be dictating this exam?->[facility] Comparison: CT scan chest 2021 Findings: Bone scan was performed following the injection of 29 mCi of MDP. Tracer uptake is seen compatible with degenerative changes Tracer uptake is seen at T12 compatible with prior vertebroplasty. Mild tracer uptake is seen at T8 compatible with wedging of the T8 vertebral body seen compatible compression fracture. Mild tracer uptake is also seen at T4 towards the right. In addition there is increased tracer uptake at the level the sternum and the manubrium at its articulation with the medial clavicle joint worse on the left than the right possibly posttraumatic head on the CT scan of the chest there is a suggestion of a fracture in this region There is no convincing evidence for metastatic disease The remaining biodistribution of radiotracer appears to be within normal limits     Findings compatible with degenerative changes Increased tracer uptake within T4 T8 and T12 as described Increased tracer uptake at the manubrium of the sternum on the left at its junction with the clavicle possibly traumatic, please correlate clinically    Us Dup Lower Extremities Bilateral Venous    Result Date: 2021  Patient MRN:  [id number] : 1965 Age: 48 years Gender: Male Order Date:  2021 1:10 PM EXAM: US DUP LOWER EXTREMITIES BILATERAL VENOUS NUMBER OF IMAGES:  50 INDICATION:  \"two uncles had DVTs\" patient perseverating about possibly having blood clot in legs. \"two uncles had DVTs\" patient perseverating about possibly having blood clot in legs. What reading provider will be dictating this exam?->[facility] COMPARISON: None Within the visualized vessels, there is no evidence for deep venous thrombosis There is good compressibility, there is good augmentation, there is good color flow. Within the visualized vessels there is no evidence for deep venous thrombosis       Discharge Exam:    HEENT: NCAT,  PERRLA, No JVD  Heart:  RRR, no murmurs, gallops, or rubs.   Lungs:  CTA bilaterally, no wheeze, rales or rhonchi  Abd: bowel sounds present, nontender, nondistended, no masses  Extrem:  No clubbing, cyanosis, or edema    Disposition: [facility]    Patient Condition at Discharge: Stable    Patient Instructions:      Medication List      ASK your doctor about these medications    Actos 15 MG tablet  Generic drug: pioglitazone     lamoTRIgine 100 MG tablet  Commonly known as: LAMICTAL     lisinopril-hydroCHLOROthiazide 20-25 MG per tablet  Commonly known as: PRINZIDE;ZESTORETIC     metFORMIN 1000 MG tablet  Commonly known as: GLUCOPHAGE     sulfamethoxazole-trimethoprim 800-160 MG per tablet  Commonly known as: BACTRIM DS;SEPTRA DS     traMADol 50 MG tablet  Commonly known as: ULTRAM          Activity: as tolerated  Diet: Carb Controlled    Pt has been advised to: Follow-up with Zita Rose MD in 1 week. Follow-up with consultants as recommended by them    Note that over 30 minutes was spent in preparing discharge papers, discussing discharge with patient, medication review, etc.    Signed:  KARLA Tinoco - CNP  5/14/2021  10:05 AM     I personally saw, examined and provided care for the patient. Radiographs, labs and medication list were reviewed by me independently. The case was discussed in detail and plans for care were established. Review of [address] [place] [address], documentation was conducted and revisions were made as appropriate directly by me. I agree with the above documented exam, problem list, and plan of care.      Matthew Manning MD  6:45 AM  5/16/2021

## 2021-05-14 NOTE — DISCHARGE INSTR - COC
Continuity of Care Form    Patient Name: Alexandra Méndez   :  1965  MRN:  [id number]    Admit date:  2021  Discharge date:  21      Code Status Order: Full Code   Advance Directives:   Advance Care Flowsheet Documentation     Date/Time Healthcare Directive Type of Healthcare Directive Copy in [address] Agent's Name Healthcare Agent's Phone Number    21 0350  No, patient does not have an advance directive for healthcare treatment -- -- -- -- --          Admitting Physician:  Manpreet Joyce MD  PCP: Mitch Fulton MD    Discharging Nurse: [address] [facility] Unit/Room#: 7868/5991-J  Discharging Unit Phone Number: [phone]      Emergency Contact:   Extended Emergency Contact Information  Primary Emergency Contact: Roya Bender  Address: [address] [place] [address] Phone: [phone]  Mobile Phone: [phone]  Relation: Spouse    Past Surgical History:  Past Surgical History:   Procedure Laterality Date    CARPAL TUNNEL RELEASE Right 2012    LAMINECTOMY N/A 2021    T12 LAMINECTOMY WITH T12 KYPHO performed by Wilder Hummel MD at [address] [place] [address]  2411,4482    cervical 2-6, fusion    WRIST ARTHROSCOPY  14    right with decompression       Immunization History: There is no immunization history for the selected administration types on file for this patient.     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 Isolation/Infection:   Isolation          No Isolation        Patient Infection Status     None to display          Nurse Assessment:  Last Vital Signs: BP (!) 142/69   Pulse 94   Temp 97.7 °F (36.5 °C) (Temporal)   Resp 18   Ht 6' (1.829 m)   Wt 283 lb (128.4 kg)   SpO2 91%   BMI 38.38 kg/m²     Last documented pain score (0-10 scale): Pain Level: 8  Last Weight:   Wt Readings from Last 1 Encounters:   05/11/21 283 lb (128.4 kg)     Mental Status:  oriented and alert    IV Access:  - Peripheral IV - site  Right lower forearm/wrist, insertion date: 5/13/21    Nursing Mobility/ADLs:  Walking   Assisted  Transfer  Assisted  Bathing  Assisted  Dressing  Assisted  Toileting  Assisted  Feeding  Independent  Med Admin  Assisted  Med Delivery   whole    Wound Care Documentation and Therapy:        Elimination:  Continence:   · Bowel: Yes  · Bladder: Yes  Urinary Catheter: None   Colostomy/Ileostomy/Ileal Conduit: No       Date of Last BM: 5/13/21      Intake/Output Summary (Last 24 hours) at 5/14/2021 0754  Last data filed at 5/14/2021 0641  Gross per 24 hour   Intake 380 ml   Output 550 ml   Net -170 ml     I/O last 3 completed shifts: In: 380 [P.O.:360; I.V.:20]  Out: 550 [Urine:550]    Safety Concerns: At Risk for Falls    Impairments/Disabilities:      None    Nutrition Therapy:  Current Nutrition Therapy:   - Oral Diet:  Carb Control 4 carbs/meal (1800kcals/day)    Routes of Feeding: Oral  Liquids: Thin Liquids  Daily Fluid Restriction: no  Last Modified Barium Swallow with Video (Video Swallowing Test): not done    Treatments at the Time of Hospital Discharge:   Respiratory Treatments: None  Oxygen Therapy:  is not on home oxygen therapy.   Ventilator:    - No ventilator support    Rehab Therapies: Physical Therapy and Occupational Therapy  Weight Bearing Status/Restrictions: No weight bearing restirctions  Other Medical Equipment (for information only, NOT a DME order):  wheelchair, bedside commode and hospital bed  Other Treatments: NA    Patient's personal belongings (please select all that are sent with patient):  personal belongings     RN SIGNATURE:  Electronically signed by Anat Ortiz on 5/14/21 at 7:58 AM EDT    CASE MANAGEMENT/SOCIAL WORK SECTION    Inpatient Status Date: ***    Readmission Risk Assessment Score:  Readmission Risk              Risk of Unplanned Readmission:        20           Discharging to Facility/ Agency   · Name:   · Address:  · Phone:  · Fax:    Dialysis Facility (if applicable)   · Name:  · Address:  · Dialysis Schedule:  · Phone:  · Fax:    / signature: {Esignature:[id number]}    PHYSICIAN SECTION    Prognosis: {Prognosis:9841877061}    Condition at Discharge: [address] [place] [address] Patient Condition:392985362}    Rehab Potential (if transferring to Rehab): {Prognosis:7983895169}    Recommended Labs or Other Treatments After Discharge: ***    Physician Certification: I certify the above information and transfer of Russ Cassidy  is necessary for the continuing treatment of the diagnosis listed and that he requires {Admit to Appropriate Level of Care:46535} for {GREATER/LESS:738949540} 30 days.      Update Admission H&P: {CHP DME Changes in MLUOZ:513639931}    PHYSICIAN SIGNATURE:  {Esignature:837482392}

## 2021-05-14 NOTE — FLOWSHEET NOTE
Rodrigo Pulling with Dr. Abhijit Lopez updated that patient is being picked up around 10 am. Needs discharge complete.

## 2021-06-01 NOTE — ADT AUTH CERT
Patient Demographics    Name Patient ID SSN Gender Identity Birth Date   Angelito Hendrickson [id number]  Male [birth year] (48 yrs)   Address Phone Email Employer    [address]   [place] [address] [phone] (Z)   [phone] (M) [email or web address] NOT [address] [facility] Race Occupation Emp Status     Juan -- Not Employed    Reg Status PCP Date Last Verified Next Review Date    Verified [address] [facility], [address] [place] [address] 05/15/21 06/14/21    Admission Date Discharge Date Admitting Provider     21 Lucy Snyder MD     Marital Status [employer]       [facility]      Emergency Contact 1   [employer] (2)   [address]   [place]   [phone] (O)   [phone] (L)   [address] [facility] [address] Account [de-identified]  CV[employer] Subscriber Name/Sex/Relation Subscriber  Subscriber Address/Phone Subscriber Emp/Emp Phone   1Diana Sanchezjose   [id number] CAYDENRENUKANGA ESCOBEDOZORAIDASOFIYA - Male   (Self) 1965 [address], [place]  [id number] [phone](Y) NOT EMPLOYED    Utilization Reviews       Back Pain - Care Day 8 (2021) by Ese Dietrich RN       Review Status Review Entered   Completed 2021 15:41      Criteria Review      Care Day: 8 Care Date: 2021 Level of Care: Inpatient Floor    Guideline Day 2    Clinical Status    ( ) * Pain absent or managed    2021 3:41 PM EDT by Clara salgado requiring multiple pain med doses    ( ) * Acute etiologies requiring continued inpatient care absent    ( ) * Discharge plans and education understood    Activity    ( ) * Ambulation as tolerated    Routes    (X) * Oral hydration, medications, and diet    2021 3:41 PM EDT by Clara Vera      CCD    Medications    (X) * Oral analgesics    2021 3:41 PM EDT by Clara Vera      oxycodone po prn as well as iv dilaudid    ( ) * PCA absent    * Milestone   Additional Notes           VS- BP (!) 181/83   Pulse 97   Temp Pain absent or managed    5/26/2021 3:36 PM EDT by Edna Alfaro      report pain all over    ( ) * Acute etiologies requiring continued inpatient care absent    ( ) * Discharge plans and education understood    Activity    ( ) * Ambulation as tolerated    Routes    (X) * Oral hydration, medications, and diet    5/26/2021 3:36 PM EDT by Edna Alfaro      CCD diet    Medications    ( ) * Oral analgesics    5/26/2021 3:36 PM EDT by Edna Alfaro      iv dilaudid    ( ) * PCA absent    * Milestone   Additional Notes   5/1        BP (!) 144/70   Pulse 92   Temp 99.8 °F (37.7 °C) (Temporal)   Resp 20   Ht 6' (1.829 m)   Wt 278 lb (126.1 kg)   SpO2 93%      MEDS  Dilaudid 1mg ivp x9 doses   Oxycodone 10mg po prn x3   ceFAZolin (ANCEF) IVPB 3,000 mg Intravenous Q8H   · oxyCODONE 10 mg Oral 2 times per day   · sennosides-docusate sodium 1 tablet Oral BID   · polyethylene glycol 17 g Oral BID   · gabapentin 300 mg Oral TID   · nicotine 1 patch Transdermal Daily   · acetaminophen 1,000 mg Oral TID   · lamoTRIgine 100 mg Oral BID   · sodium chloride flush 10 mL Intravenous 2 times per day   · insulin lispro 0-6 Units Subcutaneous TID WC   · insulin lispro 0-3 Units Subcutaneous Nightly   · lisinopril 20 mg Oral Daily      **ID note**   Assessment:   · T11-T12 compression fracture with spinal stenosis-s/p laminectomy ( 4/30) cx neg to date    Plan:     · Watch off abx    · Follow cx, pathology    · Will follow with you      **Neuro surgeon note**   ASSESSMENT:    · S/p T12 kyphoplasty and T12 laminectomy for tumor decompression   PLAN:   · Await pathology results   · WBAT with TLSO   · Pain control   · Oncology consulted         **Oncology consult**   IMPRESSION:  A 67-year-old gentleman with pathologic T12 compression   fracture and a large right renal mass; another mass in the kidney,   possible adrenal gland lesion, likely a metastatic renal cell carcinoma.       RECOMMENDATIONS:  Follow up on the pathology from the surgery he had of   the T12 debulking, likely metastatic deposit from kidney cancer.     Arrange for Urology to see the patient. Barb Friedman he has multifocal disease in   the right kidney and oligometastatic disease, may be a candidate for   surgical resection of the kidney, partial nephrectomy, ablation,   localized options for the kidney.  We will need to address whether or   not the adrenal lesion is malignant or more of a benign etiology.  Would   like to do further imaging studies; however, the patient just had   surgery and is uncomfortable.  This can be delayed for a few days

## 2021-06-04 ENCOUNTER — HOSPITAL ENCOUNTER (OUTPATIENT)
Dept: RADIATION ONCOLOGY | Age: 56
Discharge: HOME | End: 2021-06-04
Payer: COMMERCIAL

## 2021-06-04 DIAGNOSIS — C79.51 SECONDARY CANCER OF BONE (HCC): Primary | ICD-10-CM

## 2021-06-04 PROCEDURE — 99442 PR PHYS/QHP TELEPHONE EVALUATION 11-20 MIN: CPT | Performed by: RADIOLOGY

## 2021-06-04 NOTE — PROGRESS NOTES
Radiation Oncology   Follow Up Note  Liza Zendejas. Sharyle Delaware MD MS DABR      6/4/2021  Consuella Running  Date of Service: 6/4/21        HPI:        -DIAG:     met CA s/p laminectomy and palliative RT at [facility] (see EPIC CE)  -TX:   Laminectomy and palliative single dose RT  -Interval HIST:  Today, Tereso Barnes states he has yet to FU with [address] however did have palliative RT at [facility] during his admission there s/p laminectomy for metastatic RCC (probable). Imaging and OSH LAI and treatment reviewed. His pain is still not well controlled. He desires his FU care close to home. KPS 70. Imaging reviewed prior and during visit:     CT chest 5/1/21:  Impression   Compression fractures of T8 and T12 with vertebroplasty at T12 and surgical   changes at the thoracolumbar junction.       Nonspecific right adrenal mass and upper it did in the nail and a abdomen   lymphadenopathy.       Atelectasis in the lung bases.  Consider further assessment by PET-CT scan.       CT abdomen and pelvis.       The liver is decreased in attenuation likely liver disease or fatty   infiltration.  The gallbladder is distended.  Consider ultrasonography. Spleen, pancreas, and the left adrenal gland appear normal.  A previously   noted right adrenal mass measuring 2.3 x 3.4 cm is identified.  Left kidney   is normal except for a 1.2 cm cystic lesion in the left kidney.  There is a   large complex cystic and solid mass measuring 5.2 x 4.7 x 6.4 cm involving   the inferior pole of the right kidney.  Additional complex hypodense lesions   are identified in the right kidney, the largest 1 measuring 2.8 x 2.6 cm.    Multifocal malignancy is considered.  Pyelonephritis with abscess is less   likely.  Previously noted compression fracture of T12 is noted with   postoperative changes in the thoracolumbar junction.       Pelvis.  Bladder is distended.  Prostate gland is prominent.  Fat containing   right inguinal hernia is noted. Valerie West is Date    CARPAL TUNNEL RELEASE Right 2012    LAMINECTOMY N/A 4/30/2021    T12 LAMINECTOMY WITH T12 KYPHO performed by Deepa Giang MD at [address]  [phone]    cervical 2-6, fusion    WRIST ARTHROSCOPY  5/30/14    right with decompression         Social History     Socioeconomic History    Marital status:      Spouse name: Not on file    Number of children: Not on file    Years of education: Not on file    Highest education level: Not on file   Occupational History    Not on file   Tobacco Use    Smoking status: Current Every Day Smoker     Packs/day: 0.25     Years: 22.00     Pack years: 5.50    Smokeless tobacco: Never Used   Substance and Sexual Activity    Alcohol use: No    Drug use: No    Sexual activity: Not on file   Other Topics Concern    Not on file   Social History Narrative    Not on file     Social Determinants of Health     Financial Resource Strain:     Difficulty of Paying Living Expenses:    Food Insecurity:     Worried About Running Out of Food in the Last Year:     Ran Out of Food in the Last Year:    Transportation Needs:     Lack of Transportation (Medical):  Lack of Transportation (Non-Medical):    Physical Activity:     Days of Exercise per Week:     Minutes of Exercise per Session:    Stress:     Feeling of Stress :    Social Connections:     Frequency of Communication with Friends and Family:     Frequency of Social Gatherings with Friends and Family:     Attends Shinto Services:     Active Member of Clubs or Organizations:     Attends Club or Organization Meetings:     Marital Status:    Intimate Partner Violence:     Fear of Current or Ex-Partner:     Emotionally Abused:     Physically Abused:     Sexually Abused:          No family history on file. Allergies:   Patient has no known allergies.       Current Outpatient Medications   Medication Sig Dispense Refill    lisinopril-hydroCHLOROthiazide (PRINZIDE;ZESTORETIC) 20-25 MG

## 2021-06-07 LAB
FUNGUS (MYCOLOGY) CULTURE: NORMAL
FUNGUS STAIN: NORMAL

## 2021-06-18 ENCOUNTER — TELEPHONE (OUTPATIENT)
Dept: PALLATIVE CARE | Age: 56
End: 2021-06-18

## 2021-06-18 NOTE — TELEPHONE ENCOUNTER
Called to [address] regarding referral from [facility] for Palliative care. No answer, left message with contact number.

## 2021-06-22 LAB
AFB CULTURE (MYCOBACTERIA): NORMAL
AFB SMEAR: NORMAL

## 2021-06-24 ENCOUNTER — TELEPHONE (OUTPATIENT)
Dept: ONCOLOGY | Age: 56
End: 2021-06-24

## 2021-06-24 ENCOUNTER — TELEPHONE (OUTPATIENT)
Dept: PALLATIVE CARE | Age: 56
End: 2021-06-24

## 2021-06-24 NOTE — TELEPHONE ENCOUNTER
Patient is Dr. Aram Landeros, Radiation Oncology referral for SECONDARY CA OF BONE. Patient was originally scheduled for New Patient Consult with Dr. Dereck King 06/10/21. Message(s) left on voicemail. Patient never returned our calls, was NO SHOW for 06/10/21 Consult. Sent No Show letter to patient urging him to call our office at his earliest convenience. Patient did not call and did not return our calls. Per conversations with Ita Munoz. -RN Palliative and Lisbet Fierro -patient navigator Radiation Oncology, reaching out to patient again. Received voice mail and left DETAILED message explaining to patient to please return our phone call and either confirm or reschedule if this new date and time of 06/28/21 @ 08:15 am. is not convenient for him. Also called [facility] [place], [address] [place] [address]., [facility], [facility] Milly Russell @ ph. #[phone]. Per both [facility] patient is residing there. Phone just rang and rang and rang, no voice mail. Will try again to call [facility] [place].

## 2021-06-24 NOTE — TELEPHONE ENCOUNTER
Left VM for Abimael Smyth regarding referral for Palliative. Unsure if patient is being treated at [facility], being followed by their [facility]. Contact number left in message,  offering appointment if needed.

## 2021-06-25 ENCOUNTER — TELEPHONE (OUTPATIENT)
Dept: PALLATIVE CARE | Age: 56
End: 2021-06-25

## 2021-06-30 ENCOUNTER — TELEPHONE (OUTPATIENT)
Dept: ONCOLOGY | Age: 56
End: 2021-06-30

## 2021-06-30 ENCOUNTER — TELEPHONE (OUTPATIENT)
Dept: CASE MANAGEMENT | Age: 56
End: 2021-06-30

## 2021-06-30 NOTE — TELEPHONE ENCOUNTER
Patient cancelled Medical Oncology appointment with Dr Radha Wu today. Chart check performed and noted that patient had been seen by [facility] while inpatient in May. Contacted their office to verify if patient was following with them. They were unaware of his discharge and had not scheduled anything with him. They will call to see about scheduling a follow up and update Radiation Oncology.

## 2021-06-30 NOTE — TELEPHONE ENCOUNTER
Patient referred to our Medical Oncology office per Dr. Inna Guy, Radiation Oncology, for SECONDARY CA OF BONE. Patient scheduled 06/10/21, was No Show. Patient was then rescheduled for 06/28/21 for New Patient Consult with Dr. Kendra Campo. Left messages. Patient No Showed again. Called patient 06/28/21 and rescheduled him for today, 06/30/21, patient confirmed. Received phone call from patient today, 06/30/21, stating he was up all night sick and is cancelling today's consult with Dr. Kendra Campo. Patient does not want to reschedule at this time. Patient stated he will have the nurses @ [address] [place] [address] W call our office to reschedule his consult appointment according to their next available transportation opening. Strongly encouraged patient to follow up with rescheduling this New Patient Consult referral by Dr. Inna Guy. Patient verbalized his agreement.

## 2024-02-12 NOTE — PROGRESS NOTES
Patient yelling profanities at nursing staff and accusing [facility] of stealing his dilaudid. Patient was informed that he was given pain med and at what time it was given. Doctor was notified and new orders were placed. Patient has since apologized and is now using call light. wheelchair

## (undated) DEVICE — SYRINGE A08E KIS INFLATION HP: Brand: KYPHON®  INFLATION SYRINGE

## (undated) DEVICE — GRADUATE

## (undated) DEVICE — READY WET SKIN SCRUB TRAY-LF: Brand: MEDLINE INDUSTRIES, INC.

## (undated) DEVICE — TUBING, SUCTION, 3/16" X 12', STRAIGHT: Brand: MEDLINE

## (undated) DEVICE — SHEET, T, LAPAROTOMY, STERILE: Brand: MEDLINE

## (undated) DEVICE — BONE FILLER DEVICE F04B SIZE 3

## (undated) DEVICE — APPLICATOR PREP 26ML 0.7% IOD POVACRYLEX 74% ISO ALC ST

## (undated) DEVICE — TUBING SUCT 12FR MAL ALUM SHFT FN CAP VENT UNIV CONN W/ OBT

## (undated) DEVICE — ADHESIVE SKIN CLOSURE TOP 36 CC HI VISC DERMBND MINI

## (undated) DEVICE — GLOVE SURG 8.5 PF POLYMER WHT STRL SIGN LTX ESSENTIAL LTX

## (undated) DEVICE — INTENDED FOR TISSUE SEPARATION, AND OTHER PROCEDURES THAT REQUIRE A SHARP SURGICAL BLADE TO PUNCTURE OR CUT.: Brand: BARD-PARKER ® STAINLESS STEEL BLADES

## (undated) DEVICE — GARMENT,MEDLINE,DVT,INT,CALF,LG, GEN2: Brand: MEDLINE

## (undated) DEVICE — TAMP K08A XPAN INFLAT BONE  SIZE 20/3-RB: Brand: KYPHON XPANDER™ INFLATABLE BONE TAMP

## (undated) DEVICE — NEEDLE SPNL L3.5IN PNK HUB S STL REG WALL FIT STYL W/ QNCKE

## (undated) DEVICE — SYRINGE 20ML LL S/C 50

## (undated) DEVICE — CODMAN® SURGICAL PATTIES 1/2" X 1" (1.27CM X 2.54CM): Brand: CODMAN®

## (undated) DEVICE — BONE BIOPSY DEVICE F05A BBD SIZE 3: Brand: MEDTRONIC REUSABLE INSTRUMENTS

## (undated) DEVICE — HYPODERMIC SAFETY NEEDLE: Brand: MAGELLAN

## (undated) DEVICE — DRAPE C ARM UNIV W41XL74IN CLR PLAS XR VELC CLSR POLY STRP

## (undated) DEVICE — 3M(TM) MEDIPORE(TM) +PAD SOFT CLOTH ADHESIVE WOUND DRESSING 3569: Brand: 3M™ MEDIPORE™

## (undated) DEVICE — PAD,NON-ADHERENT,2X3,STERILE,LF,1/PK: Brand: MEDLINE

## (undated) DEVICE — 5.0MM PRECISION ROUND

## (undated) DEVICE — BLADE ES L6IN ELASTOMERIC COAT EXT DURABLE BEND UPTO 90DEG

## (undated) DEVICE — Device

## (undated) DEVICE — GOWN,SIRUS,FABRNF,XL,20/CS: Brand: MEDLINE

## (undated) DEVICE — 1.5L THIN WALL CAN: Brand: CRD

## (undated) DEVICE — 3M™ IOBAN™ 2 ANTIMICROBIAL INCISE DRAPE 6650EZ: Brand: IOBAN™ 2

## (undated) DEVICE — SURGICAL PROCEDURE PACK LAMINECTOMY LUMBAR

## (undated) DEVICE — CLOTH SURG PREP PREOPERATIVE CHLORHEXIDINE GLUC 2% READYPREP

## (undated) DEVICE — GLOVE ORANGE PI 8   MSG9080

## (undated) DEVICE — LABEL MED 4 IN SURG PANEL W/ PEN STRL

## (undated) DEVICE — SOLUTION IV IRRIG WATER 1000ML POUR BRL 2F7114

## (undated) DEVICE — SET SPINAL NEURO STNEU1

## (undated) DEVICE — DRAPE THER FLUID WARMING 66X44 IN FLAT SLUSH DBL DISC ORS

## (undated) DEVICE — EXTRAS UGOKWE

## (undated) DEVICE — JACKSON TABLE POSITIONER KIT: Brand: MEDLINE INDUSTRIES, INC.

## (undated) DEVICE — 1000 S-DRAPE TOWEL DRAPE 10/BX: Brand: STERI-DRAPE™

## (undated) DEVICE — TRAP,MUCUS SPECIMEN,40CC: Brand: MEDLINE

## (undated) DEVICE — DOUBLE BASIN SET: Brand: MEDLINE INDUSTRIES, INC.